# Patient Record
Sex: FEMALE | Race: WHITE | Employment: OTHER | URBAN - METROPOLITAN AREA
[De-identification: names, ages, dates, MRNs, and addresses within clinical notes are randomized per-mention and may not be internally consistent; named-entity substitution may affect disease eponyms.]

---

## 2017-03-10 ENCOUNTER — OFFICE VISIT (OUTPATIENT)
Dept: INTERNAL MEDICINE | Facility: CLINIC | Age: 77
End: 2017-03-10
Payer: COMMERCIAL

## 2017-03-10 ENCOUNTER — TELEPHONE (OUTPATIENT)
Dept: INTERNAL MEDICINE | Facility: CLINIC | Age: 77
End: 2017-03-10

## 2017-03-10 DIAGNOSIS — Z00.00 ROUTINE GENERAL MEDICAL EXAMINATION AT A HEALTH CARE FACILITY: Primary | ICD-10-CM

## 2017-03-10 DIAGNOSIS — R14.2 FLATULENCE, ERUCTATION, AND GAS PAIN: ICD-10-CM

## 2017-03-10 DIAGNOSIS — G30.9 ALZHEIMER'S DEMENTIA WITHOUT BEHAVIORAL DISTURBANCE, UNSPECIFIED TIMING OF DEMENTIA ONSET: ICD-10-CM

## 2017-03-10 DIAGNOSIS — N91.2 ABSENCE OF MENSTRUATION: ICD-10-CM

## 2017-03-10 DIAGNOSIS — F02.80 ALZHEIMER'S DEMENTIA WITHOUT BEHAVIORAL DISTURBANCE, UNSPECIFIED TIMING OF DEMENTIA ONSET: ICD-10-CM

## 2017-03-10 DIAGNOSIS — Z23 NEED FOR PROPHYLACTIC VACCINATION AND INOCULATION AGAINST INFLUENZA: ICD-10-CM

## 2017-03-10 DIAGNOSIS — R14.3 FLATULENCE, ERUCTATION, AND GAS PAIN: ICD-10-CM

## 2017-03-10 DIAGNOSIS — R14.1 FLATULENCE, ERUCTATION, AND GAS PAIN: ICD-10-CM

## 2017-03-10 LAB
ERYTHROCYTE [DISTWIDTH] IN BLOOD BY AUTOMATED COUNT: 13.3 % (ref 10–15)
HCT VFR BLD AUTO: 45.7 % (ref 35–47)
HGB BLD-MCNC: 14.9 G/DL (ref 11.7–15.7)
MCH RBC QN AUTO: 31 PG (ref 26.5–33)
MCHC RBC AUTO-ENTMCNC: 32.6 G/DL (ref 31.5–36.5)
MCV RBC AUTO: 95 FL (ref 78–100)
PLATELET # BLD AUTO: 231 10E9/L (ref 150–450)
RBC # BLD AUTO: 4.8 10E12/L (ref 3.8–5.2)
WBC # BLD AUTO: 6 10E9/L (ref 4–11)

## 2017-03-10 PROCEDURE — 80061 LIPID PANEL: CPT | Performed by: INTERNAL MEDICINE

## 2017-03-10 PROCEDURE — 84443 ASSAY THYROID STIM HORMONE: CPT | Performed by: INTERNAL MEDICINE

## 2017-03-10 PROCEDURE — 99397 PER PM REEVAL EST PAT 65+ YR: CPT | Performed by: INTERNAL MEDICINE

## 2017-03-10 PROCEDURE — 85027 COMPLETE CBC AUTOMATED: CPT | Performed by: INTERNAL MEDICINE

## 2017-03-10 PROCEDURE — 80053 COMPREHEN METABOLIC PANEL: CPT | Performed by: INTERNAL MEDICINE

## 2017-03-10 PROCEDURE — 36415 COLL VENOUS BLD VENIPUNCTURE: CPT | Performed by: INTERNAL MEDICINE

## 2017-03-10 RX ORDER — DONEPEZIL HYDROCHLORIDE 5 MG/1
5 TABLET, FILM COATED ORAL AT BEDTIME
Qty: 90 TABLET | Refills: 3 | Status: SHIPPED | OUTPATIENT
Start: 2017-03-10

## 2017-03-10 RX ORDER — MEMANTINE HYDROCHLORIDE 10 MG/1
10 TABLET ORAL 2 TIMES DAILY
Qty: 180 TABLET | Refills: 3 | Status: ON HOLD | OUTPATIENT
Start: 2017-03-10 | End: 2018-03-20

## 2017-03-10 NOTE — MR AVS SNAPSHOT
After Visit Summary   3/10/2017    Adriane Flores    MRN: [id number]           Patient Information     Date Of Birth          1940        Visit Information        Provider Department      3/10/2017 10:00 AM Saul Wheeler MD [facility]        Today's Diagnoses     Routine general medical examination at a health care facility    -  1    Need for prophylactic vaccination and inoculation against influenza        Alzheimer's dementia without behavioral disturbance, unspecified timing of dementia onset        Absence of menstruation        Flatulence, eructation, and gas pain          Care Instructions      Preventive Health Recommendations    Female Ages 65 +    Yearly exam:     See your health care provider every year in order to  o Review health changes.   o Discuss preventive care.    o Review your medicines if your doctor has prescribed any.      You no longer need a yearly Pap test unless you've had an abnormal Pap test in the past 10 years. If you have vaginal symptoms, such as bleeding or discharge, be sure to talk with your provider about a Pap test.      Every 1 to 2 years, have a mammogram.  If you are over 69, talk with your health care provider about whether or not you want to continue having screening mammograms.      Every 10 years, have a colonoscopy. Or, have a yearly FIT test (stool test). These exams will check for colon cancer.       Have a cholesterol test every 5 years, or more often if your doctor advises it.       Have a diabetes test (fasting glucose) every three years. If you are at risk for diabetes, you should have this test more often.       At age 65, have a bone density scan (DEXA) to check for osteoporosis (brittle bone disease).    Shots:    Get a flu shot each year.    Get a tetanus shot every 10 years.    Talk to your doctor about your pneumonia vaccines. There are now two you should receive - Pneumovax (PPSV 23) and Prevnar (PCV 13).    Talk  "to your doctor about the shingles vaccine.    Talk to your doctor about the hepatitis B vaccine.    Nutrition:     Eat at least 5 servings of fruits and vegetables each day.      Eat whole-grain bread, whole-wheat pasta and brown rice instead of white grains and rice.      Talk to your provider about Calcium and Vitamin D.     Lifestyle    Exercise at least 150 minutes a week (30 minutes a day, 5 days a week). This will help you control your weight and prevent disease.      Limit alcohol to one drink per day.      No smoking.       Wear sunscreen to prevent skin cancer.       See your dentist twice a year for an exam and cleaning.      See your eye doctor every 1 to 2 years to screen for conditions such as glaucoma, macular degeneration and cataracts.        Follow-ups after your visit        Who to contact     If you have questions or need follow up information about today's clinic visit or your schedule please contact [facility] directly at [phone].  Normal or non-critical lab and imaging results will be communicated to you by MyChart, letter or phone within 4 business days after the clinic has received the results. If you do not hear from us within 7 days, please contact the clinic through [facility] or phone. If you have a critical or abnormal lab result, we will notify you by phone as soon as possible.  Submit refill requests through [employer] or call your pharmacy and they will forward the refill request to us. Please allow 3 business days for your refill to be completed.          Additional Information About Your Visit        iJoule Information     [employer]le lets you send messages to your doctor, view your test results, renew your prescriptions, schedule appointments and more. To sign up, go to [email or web address] . Click on \"Log in\" on the left side of the screen, which will take you to the Welcome page. Then click on \"Sign up Now\" on the right side of the page.     You will be asked to " enter the access code listed below, as well as some personal information. Please follow the directions to create your username and password.     Your access code is: [email or web address]  Expires: 2017 10:34 AM     Your access code will  in 90 days. If you need help or a new code, please call your [facility] or [phone].        Care EveryWhere ID     This is your Care EveryWhere ID. This could be used by other organizations to access your [place] medical records  [id number]         Blood Pressure from Last 3 Encounters:   16 118/70   16 128/72   02/18/15 128/76    Weight from Last 3 Encounters:   16 126 lb (57.2 kg)   16 123 lb (55.8 kg)   02/18/15 119 lb 3.2 oz (54.1 kg)              We Performed the Following     CBC with platelets     Comprehensive metabolic panel     Lipid panel reflex to direct LDL     TSH with free T4 reflex          Where to get your medicines      These medications were sent to [facility] [id number] [place] [address] [address] [place] [facility] [place] [address], [facility] [address]     Phone:  [phone]     donepezil 5 MG tablet    memantine 10 MG tablet          Primary Care Provider Office Phone # Fax #    Saul Wheeler [phone]       [facility] [address] [place] [address]        Thank you!     Thank you for choosing [facility]  for your care. Our goal is always to provide you with excellent care. Hearing back from our patients is one way we can continue to improve our services. Please take a few minutes to complete the written survey that you may receive in the mail after your visit with us. Thank you!             Your Updated Medication List - Protect others around you: Learn how to safely use, store and throw away your medicines at www.disposemymeds.org.          This list is accurate as of: 3/10/17 10:34 AM.  Always use your most recent med list.                    Brand Name Dispense Instructions for use    CALTRATE 600 + D 600-200 MG-IU Tabs     35    1 TABLET BID       donepezil 5 MG tablet    ARICEPT    90 tablet    Take 1 tablet (5 mg) by mouth At Bedtime       glucosamine sulfate 1000 MG Caps      1 tablet daily along with 1 500mg tablet       memantine 10 MG tablet    NAMENDA    180 tablet    Take 1 tablet (10 mg) by mouth 2 times daily       multivitamin per tablet      1 tablet daily

## 2017-03-10 NOTE — PROGRESS NOTES
SUBJECTIVE:                                                            Adriane Flores is a 76 year old female who presents for Preventive Visit.      Are you in the first 12 months of your Medicare Part B coverage?  No    Healthy Habits:    Do you get at least three servings of calcium containing foods daily (dairy, green leafy vegetables, etc.)? yes    Amount of exercise or daily activities, outside of work: none    Problems taking medications regularly No    Medication side effects: No    Have you had an eye exam in the past two years? no    Do you see a dentist twice per year? yes    Do you have sleep apnea, excessive snoring or daytime drowsiness?no    COGNITIVE SCREENING:  Not appropriate due to known dementia        Pt with dementia. Her  is taking care of her, on Namenda and Aricept. Slow progression.     Reviewed and updated as needed this visit by clinical staff  Tobacco  Allergies  Problems  Med Hx  Surg Hx  Fam Hx  Soc Hx        Reviewed and updated as needed this visit by Provider        Social History   Substance Use Topics     Smoking status: Former Smoker     Packs/day: 0.50     Years: 15.00     Quit date: 8/11/1970     Smokeless tobacco: Never Used      Comment: QUIT 25 YEARS AGO     Alcohol use No       none    Today's PHQ-2 Score:   PHQ-2 ( 1999 Pf[employer]) 3/10/2017 2/23/2016   Q1: Little interest or pleasure in doing things 0 0   Q2: Feeling down, depressed or hopeless 0 0   PHQ-2 Score 0 0       Do you feel safe in your environment - Yes    Do you have a Health Care Directive?: Yes: Advance Directive has been received and scanned.    Current providers sharing in care for this patient include:   Patient Care Team:  Saul Wheeler MD as PCP - General (Internal Medicine)      Hearing impairment: No    Ability to successfully perform activities of daily living: No, needs assistance with: phone, transportation, shopping, preparing meals, housework, bathing, laundry, medications  "and managing money     Fall risk:  Fallen 2 or more times in the past year?: No  Any fall with injury in the past year?: No    Home safety:  none identified      The following health maintenance items are reviewed in Epic and correct as of today:  Health Maintenance   Topic Date Due     ADVANCE DIRECTIVE PLANNING Q5 YRS (NO INBASKET)  01/03/2017     FALL RISK ASSESSMENT  02/23/2017     INFLUENZA VACCINE (SYSTEM ASSIGNED)  09/01/2017     LIPID SCREEN Q5 YR FEMALE (SYSTEM ASSIGNED)  02/23/2021     TETANUS Q10 YR  08/01/2024     DEXA SCAN SCREENING (SYSTEM ASSIGNED)  Completed     PNEUMOCOCCAL  Completed              ROS:  C: NEGATIVE for fever, chills, change in weight  I: NEGATIVE for worrisome rashes, moles or lesions  E: NEGATIVE for vision changes or irritation  E/M: NEGATIVE for ear, mouth and throat problems  R: NEGATIVE for significant cough or SOB  B: NEGATIVE for masses, tenderness or discharge  CV: NEGATIVE for chest pain, palpitations or peripheral edema  GI: NEGATIVE for nausea, abdominal pain, heartburn, or change in bowel habits  : NEGATIVE for frequency, dysuria, or hematuria  M: NEGATIVE for significant arthralgias or myalgia  N: NEGATIVE for weakness, dizziness or paresthesias  E: NEGATIVE for temperature intolerance, skin/hair changes  H: NEGATIVE for bleeding problems  P: NEGATIVE for changes in mood or affect    Problem list, Medication list, Allergies, and Medical/Social/Surgical histories reviewed in [facility] and updated as appropriate.  OBJECTIVE:                                                            There were no vitals taken for this visit. Estimated body mass index is 23.81 kg/(m^2) as calculated from the following:    Height as of 2/23/16: 5' 1\" (1.549 m).    Weight as of 2/23/16: 126 lb (57.2 kg).  EXAM:   GENERAL: healthy, alert and no distress  EYES: Eyes grossly normal to inspection, PERRL and conjunctivae and sclerae normal  HENT: ear canals and TM's normal, nose and mouth without " "ulcers or lesions  NECK: no adenopathy, no asymmetry, masses, or scars and thyroid normal to palpation  RESP: lungs clear to auscultation - no rales, rhonchi or wheezes  BREAST: normal without masses, tenderness or nipple discharge and no palpable axillary masses or adenopathy  CV: regular rate and rhythm, normal S1 S2, no S3 or S4, no murmur, click or rub, no peripheral edema and peripheral pulses strong  ABDOMEN: soft, nontender, no hepatosplenomegaly, no masses and bowel sounds normal  MS: no gross musculoskeletal defects noted, no edema  SKIN: no suspicious lesions or rashes  NEURO: Normal strength and tone, mentation intact and speech normal  PSYCH: mentation - confuse,  affect normal    ASSESSMENT / PLAN:                                                                ICD-10-CM    1. Routine general medical examination at a health care facility Z00.00 CBC with platelets     Comprehensive metabolic panel     TSH with free T4 reflex     Lipid panel reflex to direct LDL   2. Need for prophylactic vaccination and inoculation against influenza Z23 donepezil (ARICEPT) 5 MG tablet   3. Alzheimer's dementia without behavioral disturbance, unspecified timing of dementia onset G30.9 memantine (NAMENDA) 10 MG tablet    F02.80 CBC with platelets     Comprehensive metabolic panel     TSH with free T4 reflex     Lipid panel reflex to direct LDL   4. Absence of menstruation N91.2    5. Flatulence, eructation, and gas pain R14.3     R14.1     R14.2        End of Life Planning:  Patient currently has an advanced directive: Yes.  Practitioner is supportive of decision.    COUNSELING:  Reviewed preventive health counseling, as reflected in patient instructions       Regular exercise       Healthy diet/nutrition       Vision screening       Hearing screening        Estimated body mass index is 23.81 kg/(m^2) as calculated from the following:    Height as of 2/23/16: 5' 1\" (1.549 m).    Weight as of 2/23/16: 126 lb (57.2 kg).     " reports that she quit smoking about 46 years ago. She has a 7.50 pack-year smoking history. She has never used smokeless tobacco.      Appropriate preventive services were discussed with this patient, including applicable screening as appropriate for cardiovascular disease, diabetes, osteopenia/osteoporosis, and glaucoma.  As appropriate for age/gender, discussed screening for colorectal cancer, prostate cancer, breast cancer, and cervical cancer. Checklist reviewing preventive services available has been given to the patient.    Reviewed patients plan of care and provided an AVS. The Intermediate Care Plan ( asthma action plan, low back pain action plan, and migraine action plan) for Adriane meets the Care Plan requirement. This Care Plan has been established and reviewed with the Patient and spouse.    Counseling Resources:  ATP IV Guidelines  Pooled Cohorts Equation Calculator  Breast Cancer Risk Calculator  FRAX Risk Assessment  ICSI Preventive Guidelines  Dietary Guidelines for Americans, 2010  USDA's MyPlate  ASA Prophylaxis  Lung CA Screening    Saul Wheeler MD  [facility]

## 2017-03-10 NOTE — TELEPHONE ENCOUNTER
Fax received from [facility] for review and signature.  Put in Dr. Wheeler's in basket.

## 2017-03-10 NOTE — LETTER
[facility]  [address], [address]  [place]  [address]      March 13, 2017    Adriane Flores                                                                                                                                                       [address] [address]              Dear Adriane,      The results of your recent tests were within normal.      Enclosed is a copy of the results.  It was a pleasure to see you at your last appointment.    If you have any questions or concerns, please contact our office at [phone].        Sincerely,      Saul Wheeler M.D.

## 2017-03-11 LAB
ALBUMIN SERPL-MCNC: 4 G/DL (ref 3.4–5)
ALP SERPL-CCNC: 76 U/L (ref 40–150)
ALT SERPL W P-5'-P-CCNC: 21 U/L (ref 0–50)
ANION GAP SERPL CALCULATED.3IONS-SCNC: 9 MMOL/L (ref 3–14)
AST SERPL W P-5'-P-CCNC: 16 U/L (ref 0–45)
BILIRUB SERPL-MCNC: 0.4 MG/DL (ref 0.2–1.3)
BUN SERPL-MCNC: 16 MG/DL (ref 7–30)
CALCIUM SERPL-MCNC: 8.8 MG/DL (ref 8.5–10.1)
CHLORIDE SERPL-SCNC: 107 MMOL/L (ref 94–109)
CHOLEST SERPL-MCNC: 214 MG/DL
CO2 SERPL-SCNC: 29 MMOL/L (ref 20–32)
CREAT SERPL-MCNC: 0.87 MG/DL (ref 0.52–1.04)
GFR SERPL CREATININE-BSD FRML MDRD: 63 ML/MIN/1.7M2
GLUCOSE SERPL-MCNC: 65 MG/DL (ref 70–99)
HDLC SERPL-MCNC: 53 MG/DL
LDLC SERPL CALC-MCNC: 128 MG/DL
NONHDLC SERPL-MCNC: 161 MG/DL
POTASSIUM SERPL-SCNC: 3.8 MMOL/L (ref 3.4–5.3)
PROT SERPL-MCNC: 7.1 G/DL (ref 6.8–8.8)
SODIUM SERPL-SCNC: 145 MMOL/L (ref 133–144)
TRIGL SERPL-MCNC: 166 MG/DL
TSH SERPL DL<=0.005 MIU/L-ACNC: 2.01 MU/L (ref 0.4–4)

## 2017-03-15 NOTE — TELEPHONE ENCOUNTER
Nurse from [facility] called. Stated she faxed form on 3/10 and needs back ASAP so they can continue to see patient. Relayed form is in [employer] box to sign, and will see if signed yet and will fax when done.

## 2017-05-10 ENCOUNTER — TRANSFERRED RECORDS (OUTPATIENT)
Dept: HEALTH INFORMATION MANAGEMENT | Facility: CLINIC | Age: 77
End: 2017-05-10

## 2017-07-05 ENCOUNTER — APPOINTMENT (OUTPATIENT)
Dept: GENERAL RADIOLOGY | Facility: CLINIC | Age: 77
End: 2017-07-05
Attending: EMERGENCY MEDICINE
Payer: COMMERCIAL

## 2017-07-05 ENCOUNTER — HOSPITAL ENCOUNTER (EMERGENCY)
Facility: CLINIC | Age: 77
Discharge: HOME | End: 2017-07-05
Attending: EMERGENCY MEDICINE | Admitting: EMERGENCY MEDICINE
Payer: COMMERCIAL

## 2017-07-05 ENCOUNTER — APPOINTMENT (OUTPATIENT)
Dept: CT IMAGING | Facility: CLINIC | Age: 77
End: 2017-07-05
Attending: EMERGENCY MEDICINE
Payer: COMMERCIAL

## 2017-07-05 VITALS
DIASTOLIC BLOOD PRESSURE: 57 MMHG | OXYGEN SATURATION: 98 % | TEMPERATURE: 97.6 F | SYSTOLIC BLOOD PRESSURE: 181 MMHG | RESPIRATION RATE: 16 BRPM | HEART RATE: 72 BPM

## 2017-07-05 DIAGNOSIS — R10.84 ABDOMINAL PAIN, GENERALIZED: ICD-10-CM

## 2017-07-05 DIAGNOSIS — M79.662 PAIN OF LEFT LOWER LEG: ICD-10-CM

## 2017-07-05 LAB
ALBUMIN SERPL-MCNC: 3.9 G/DL (ref 3.4–5)
ALP SERPL-CCNC: 69 U/L (ref 40–150)
ALT SERPL W P-5'-P-CCNC: 19 U/L (ref 0–50)
ANION GAP SERPL CALCULATED.3IONS-SCNC: 5 MMOL/L (ref 3–14)
AST SERPL W P-5'-P-CCNC: 20 U/L (ref 0–45)
BASOPHILS # BLD AUTO: 0 10E9/L (ref 0–0.2)
BASOPHILS NFR BLD AUTO: 0.5 %
BILIRUB SERPL-MCNC: 0.4 MG/DL (ref 0.2–1.3)
BUN SERPL-MCNC: 14 MG/DL (ref 7–30)
CALCIUM SERPL-MCNC: 8.9 MG/DL (ref 8.5–10.1)
CHLORIDE SERPL-SCNC: 107 MMOL/L (ref 94–109)
CO2 SERPL-SCNC: 31 MMOL/L (ref 20–32)
CREAT SERPL-MCNC: 0.81 MG/DL (ref 0.52–1.04)
DIFFERENTIAL METHOD BLD: NORMAL
EOSINOPHIL # BLD AUTO: 0.3 10E9/L (ref 0–0.7)
EOSINOPHIL NFR BLD AUTO: 4.8 %
ERYTHROCYTE [DISTWIDTH] IN BLOOD BY AUTOMATED COUNT: 13.1 % (ref 10–15)
GFR SERPL CREATININE-BSD FRML MDRD: 69 ML/MIN/1.7M2
GLUCOSE SERPL-MCNC: 91 MG/DL (ref 70–99)
HCT VFR BLD AUTO: 43.7 % (ref 35–47)
HGB BLD-MCNC: 14.5 G/DL (ref 11.7–15.7)
IMM GRANULOCYTES # BLD: 0 10E9/L (ref 0–0.4)
IMM GRANULOCYTES NFR BLD: 0.2 %
LYMPHOCYTES # BLD AUTO: 2 10E9/L (ref 0.8–5.3)
LYMPHOCYTES NFR BLD AUTO: 34 %
MCH RBC QN AUTO: 31.1 PG (ref 26.5–33)
MCHC RBC AUTO-ENTMCNC: 33.2 G/DL (ref 31.5–36.5)
MCV RBC AUTO: 94 FL (ref 78–100)
MONOCYTES # BLD AUTO: 0.6 10E9/L (ref 0–1.3)
MONOCYTES NFR BLD AUTO: 9.5 %
NEUTROPHILS # BLD AUTO: 3.1 10E9/L (ref 1.6–8.3)
NEUTROPHILS NFR BLD AUTO: 51 %
NRBC # BLD AUTO: 0 10*3/UL
NRBC BLD AUTO-RTO: 0 /100
PLATELET # BLD AUTO: 242 10E9/L (ref 150–450)
POTASSIUM SERPL-SCNC: 4.3 MMOL/L (ref 3.4–5.3)
PROT SERPL-MCNC: 7.2 G/DL (ref 6.8–8.8)
RBC # BLD AUTO: 4.66 10E12/L (ref 3.8–5.2)
SODIUM SERPL-SCNC: 143 MMOL/L (ref 133–144)
WBC # BLD AUTO: 6 10E9/L (ref 4–11)

## 2017-07-05 PROCEDURE — 85025 COMPLETE CBC W/AUTO DIFF WBC: CPT | Performed by: EMERGENCY MEDICINE

## 2017-07-05 PROCEDURE — 74177 CT ABD & PELVIS W/CONTRAST: CPT

## 2017-07-05 PROCEDURE — 73590 X-RAY EXAM OF LOWER LEG: CPT | Mod: LT

## 2017-07-05 PROCEDURE — 80053 COMPREHEN METABOLIC PANEL: CPT | Performed by: EMERGENCY MEDICINE

## 2017-07-05 PROCEDURE — 99285 EMERGENCY DEPT VISIT HI MDM: CPT | Mod: 25

## 2017-07-05 PROCEDURE — 25000125 ZZHC RX 250: Performed by: EMERGENCY MEDICINE

## 2017-07-05 PROCEDURE — 25000128 H RX IP 250 OP 636: Performed by: EMERGENCY MEDICINE

## 2017-07-05 RX ORDER — IOPAMIDOL 755 MG/ML
64 INJECTION, SOLUTION INTRAVASCULAR ONCE
Status: COMPLETED | OUTPATIENT
Start: 2017-07-05 | End: 2017-07-05

## 2017-07-05 RX ADMIN — IOPAMIDOL 64 ML: 755 INJECTION, SOLUTION INTRAVENOUS at 14:26

## 2017-07-05 RX ADMIN — SODIUM CHLORIDE 64 ML: 9 INJECTION, SOLUTION INTRAVENOUS at 14:26

## 2017-07-05 ASSESSMENT — ENCOUNTER SYMPTOMS
COUGH: 0
ARTHRALGIAS: 1
ABDOMINAL PAIN: 1
WOUND: 1
VOMITING: 0
DIARRHEA: 0
FEVER: 0

## 2017-07-05 NOTE — ED PROVIDER NOTES
History     Chief Complaint:  Abdominal Pain and Inability to Walk    HPI   Patient history provided primarily by  present at bedside    Adrinae Flores is a 77 year old female with a history of Alzheimer's who presents with  for evaluation of abdominal pain and inability to walk. The patient's  reports the patient was at her adult  today and staff called him because she couldn't walk. He states she was falling because something was not working right. The patient can normally walk fine on her own. Staff also noted that the patient was rubbing her stomach and  thinks she is having abdominal pain.  brought her to the ED for evaluation. On arrival to the ED, the patient reports she has generalized abdominal pain and pain in her left lower leg.  notes a bruise on her left knee that he just noticed in the ED. The patient's grandson notes that the patient had a stumble and fall sometime last week. She did not hit her head when she fell. The patient's  reports she was fine yesterday and this morning. The patient denies any left hip pain.  denies any fever, vomiting, or diarrhea.     Allergies:  Sulfa     Medications:    memantine (NAMENDA) 10 MG tablet  donepezil (ARICEPT) 5 MG tablet  CALTRATE 600 + D 600-200 MG-IU OR TABS  MULTIVITAMINS OR TABS  GLUCOSAMINE SULFATE 1000 MG OR CAPS     Past Medical History:    Alzheimer's disease  Osteoarthritis  Displacement of intervertebral disc    Past Surgical History:    Tonsillectomy and adenoidectomy  Tennis elbow surgery  Left leg surgery  Eye surgery    Family History:    Alcohol/drug  CVA    Social History:  Smoking status: Former smoker, quit 1970  Alcohol use: No  Presents to the ED with her  and grandson  Marital Status:   [2]     Review of Systems   Constitutional: Negative for fever.   Respiratory: Negative for cough.    Gastrointestinal: Positive for abdominal pain. Negative for diarrhea and  vomiting.   Musculoskeletal: Positive for arthralgias and gait problem.   Skin: Positive for wound (bruise).   10 point review of systems performed and is negative except as above and in HPI.    Physical Exam     Patient Vitals for the past 24 hrs:   BP Temp Temp src Pulse Resp SpO2   07/05/17 1006 181/57 97.6  F (36.4  C) Temporal 72 16 98 %       Physical Exam  General: Resting on the gurney, appears somewhat uncomfortable  Head:  The scalp, face, and head appear normal  Mouth/Throat: Mucus membranes are moist  CV:  Regular rate    Normal S1 and S2  No pathological murmur   Resp:  Breath sounds clear and equal bilaterally    Non-labored, no retractions or accessory muscle use    No coarseness    No wheezing   GI:  Abdomen is soft, no rigidity    Abdominal tenderness to palpation diffusely. No guarding. No rebound.   MS:  Normal motor assessment of all extremities.    Good capillary refill noted.    Left distal tib/fib tender to palpation with slight swelling. Left knee with small contusion.   Neuro:   Speech is normal and fluent. Baseline dementia without focal motor or sensory deficit. The exam is otherwise difficult secondary to dementia.   Psych:  Unable to assess due to baseline dementia    Emergency Department Course   Imaging:  Radiographic findings were communicated with the patient who voiced understanding of the findings.    CT-scan Abdomen/Pelvis w contrast:  No definite acute abnormality.  Preliminary result per radiology.     X-ray Tib/Fib left, 2 views:  Chronic appearing deformity of the distal fibular shaft  and distal tibial shaft, likely related to old healed trauma.  Otherwise negative. The tibial plateau is not well visualized on the  lateral view.   Result per radiology.     Laboratory:  CBC: WNL (WBC 6.0, HGB 14.5, )   CMP: WNL (Creatinine 0.81)    Emergency Department Course:  Past medical records, nursing notes, and vitals reviewed.  1037: I performed an exam of the patient and  obtained history, as documented above.  IV inserted and blood drawn.  The patient was sent for a CT abdomen and tib/fib left while in the emergency department, findings above.    I discussed the treatment plan with the patient. They expressed understanding of this plan and consented to discharge. They will be discharged home with instructions for care and follow up. In addition, the patient will return to the emergency department if their symptoms persist, worsen, if new symptoms arise or if there is any concern.  All questions were answered.    Impression & Plan      Medical Decision Making:  Adriane Flores is a 77 year old female who presents to the emergency department today with family concern for abdominal pain. The patient has severe dementia and has difficulty making her symptoms and needs known. The patient was having difficulty walking and did have some tenderness to palpation in the left lower extremity. Xrays obtained and showed an old fracture with normal healing which did not appear acute. Given that her symptoms started this afternoon, I did not believe that this is the cause of her pain. Additionally, there was concern for abdominal pain. CT imaging obtained and unremarkable and labs were unremarkable as well. Patient was walked in the ED and was able to walk per her routine at home, and therefore was readied for discharge. She will follow up with PCP and is discharged in stable condition.    Diagnosis:    ICD-10-CM   1. Pain of left lower leg M79.662   2. Abdominal pain, generalized R10.84     Disposition:  discharged to home  Mikki Ramirez  7/5/2017    EMERGENCY DEPARTMENT    IMikki, am serving as a scribe at 10:37 AM on 7/5/2017 to document services personally performed by Lexy Sparks MD based on my observations and the provider's statements to me.        Lexy Sparks MD  07/08/17 [id number]

## 2017-07-05 NOTE — DISCHARGE INSTRUCTIONS
Discharge Instructions  Extremity Injury    You were seen today for an injury to an extremity (arm, hand, leg, or foot). You may have a bruise, strain, or fracture (broken bone).    Return to the Emergency Department or see your regular doctor if your injured area is not back to normal within 5-7 days.    Return to the Emergency Department right away if:    Your pain seems to change or get worse or there is pain in a new area.    Your extremity becomes pale, cool, blue, or numb or tingling past the injury.    You have more drainage, redness or pain in the area of the cut or abrasion.    You have pain that you can t control with the medicine recommended or prescribed here, or you have pain that seems too much for your injury.    Your child will not stop crying or is much more fussy than normal.    You have new symptoms or anything that worries you.    What to Expect:    Your swelling and pain may be worse the day after your injury, but should not be severe and should start getting better after that. You should not have new symptoms and your pain should not get worse.    You may start to get a bruise over the injured area or below the injured area.    Your movement and strength should get better with time.    Some injuries may not show up until after you have left the Emergency Department so it is important to follow-up with your regular doctor.    Your injury may prevent you from working.  Follow-up with your regular doctor to get a work release note.    Pain medications or your injury may make it unsafe to drive or operate machinery.    Home Care:    Apply ice your injured area for 15 minutes at a time, at least 3 times a day. Use a cloth between the ice bag and your skin to prevent frostbite.     Do not sleep with an ice pack or heating pad on, since this can cause burns or skin injury.    Rest your injured area for at least 1-2 days. After that you may start using your extremity again as long as there is not too  much pain.     Raise the injured area above the level of your heart as much as possible in the first 1-2 days.    Use Tylenol  (acetaminophen), Motrin (ibuprofen), or Advil  (ibuprofen) for your pain unless you have an allergy or are told not to use these medications by your doctor.  Take the medications as instructed on the package. Tylenol  (acetaminophen) is in many prescription medicines and non-prescription medicines--check all of your medicines to be sure you aren t taking more than 3000 mg per day.    You may use an elastic bandage (Ace  Wrap) if it makes you more comfortable. Wrap it just tight enough to provide light compression, like a new pair of socks feels. Loosen the bandage if you have swelling past the bandage.      Please follow any other instructions that were discussed with you by your doctor.    MORE INFORMATION:    X-rays:  X-rays done today were read by your doctor but will also be read by a radiologist.  We will contact you if the radiologist sees anything different on the x-ray.  Your regular doctor may also want to review your x-rays on follow-up.    You could have a fracture (break), even if we told you your x-rays were normal. X-rays are not always certain, and some fractures are hard to see and may not show up right away.  Also, your x-ray may look like you have a fracture, even though you do not.  It is important to follow-up with your regular doctor.     Stretching:  If your injury was to your arm or shoulder and your doctor put you in a sling or an immobilizer, it is important that you take off your immobilizer within 3 days and stretch/move your shoulder, unless your doctor specifically tells you to not move your shoulder.  This is to prevent further injury such as a  frozen shoulder .     If you were given a prescription for medicine here today, be sure to read all of the information (including the package insert) that comes with your prescription.  This will include important  information about the medicine, its side effects, and any warnings that you need to know about.  The pharmacist who fills the prescription can provide more information and answer questions you may have about the medicine.  If you have questions or concerns that the pharmacist cannot address, please call or return to the Emergency Department.     Opioid Medication Information    Pain medications are among the most commonly prescribed medicines, so we are including this information for all our patients. If you did not receive pain medication or get a prescription for pain medicine, you can ignore it.     You may have been given a prescription for an opioid (narcotic) pain medicine and/or have received a pain medicine while here in the Emergency Department. These medicines can make you drowsy or impaired. You must not drive, operate dangerous equipment, or engage in any other dangerous activities while taking these medications. If you drive while taking these medications, you could be arrested for DUI, or driving under the influence. Do not drink any alcohol while you are taking these medications.     Opioid pain medications can cause addiction. If you have a history of chemical dependency of any type, you are at a higher risk of becoming addicted to pain medications.  Only take these prescribed medications to treat your pain when all other options have been tried. Take it for as short a time and as few doses as possible. Store your pain pills in a secure place, as they are frequently stolen and provide a dangerous opportunity for children or visitors in your house to start abusing these powerful medications. We will not replace any lost or stolen medicine.  As soon as your pain is better, you should flush all your remaining medication.     Many prescription pain medications contain Tylenol  (acetaminophen), including Vicodin , Tylenol #3 , Norco , Lortab , and Percocet .  You should not take any extra pills of  Tylenol  if you are using these prescription medications or you can get very sick.  Do not ever take more than 3000 mg of acetaminophen in any 24 hour period.    All opioids tend to cause constipation. Drink plenty of water and eat foods that have a lot of fiber, such as fruits, vegetables, prune juice, apple juice and high fiber cereal.  Take a laxative if you don t move your bowels at least every other day. Miralax , Milk of Magnesia, Colace , or Senna  can be used to keep you regular.      Remember that you can always come back to the Emergency Department if you are not able to see your regular doctor in the amount of time listed above, if you get any new symptoms, or if there is anything that worries you.    Discharge Instructions  Abdominal Pain    Abdominal pain can be caused by many things. Your evaluation today does not show the exact cause for your pain. Your doctor today has decided that it is unlikely your pain is due to a life threatening problem, or a problem requiring surgery or hospital admission. Sometimes those problems cannot be found right away, so it is very important that you follow up as directed.  Sometimes only the changes which occur over time allow the cause of your pain to be found.    Return to the Emergency Department for a recheck in 8-12 hours if your pain continues.  If your pain gets worse, changes in location, or feels different, return to the Emergency Department right away.    ADULTS:  Return to the Emergency Department right away if:      You get an oral temperature above 102oF or as directed by your doctor.    You have blood in your stools (bright red or black, tarry stools).    You keep throwing up or can t drink liquids.    You see blood when you throw up.    You can t have a bowel movement or you can t pass gas.    Your stomach gets bloated or bigger.    Your skin or the whites of your eyes look yellow.    You faint.    You have bloody, frequent or painful urination.    You  have new symptoms or anything that worries you.    CHILDREN:  Return to the Emergency Department right away if your child has any of the above-listed symptoms or the following:      Pushes your hand away or screams/cries when his/her belly is touched.    You notice your child is very fussy or weak.    Your child is very tired and is too tired to eat or drink.    Your child is dehydrated.  Signs of dehydration can be:  o Your infant has had no wet diapers in 4-5 hours.  o Your older child has not passed urine in 6-8 hours.  o Your infant or child starts to have dry mouth and lips, or no saliva or tears.    PREGNANT WOMEN:  Return to the Emergency Department right away if you have any of the above-listed symptoms or the following:      You have bleeding, leaking fluid or passing tissue from the vagina.    You have worse pain or cramping, or pain in your shoulder or back.    You have vomiting that will not stop.    You have painful or bloody urination.    You have a temperature of 100oF or more.    Your baby is not moving as much as usual.    You faint.    You get a bad headache with or without eye problems and abdominal pain.    You have a convulsion or seizure.    You have unusual discharge from your vagina and abdominal pain.    Abdominal pain is pretty common during pregnancy.  Your pain may or may not be related to your pregnancy. You should follow-up closely with your OB doctor so they can evaluate you and your baby.  Until you follow-up with your regular doctor, do the following:       Avoid sex and do not put anything in your vagina.    Drink clear fluids.    Only take medications approved by your doctor.    MORE INFORMATION:    Appendicitis:  A possible cause of abdominal pain in any person who still has their appendix is acute appendicitis. Appendicitis is often hard to diagnose.  Testing does not always rule out early appendicitis or other causes of abdominal pain. Close follow-up with your doctor and  "re-evaluations may be needed to figure out the reason for your abdominal pain.    Follow-up:  It is very important that you make an appointment with your clinic and go to the appointment.  If you do not follow-up with your primary doctor, it may result in missing an important development which could result in permanent injury or disability and/or lasting pain.  If there is any problem keeping your appointment, call your doctor or return to the Emergency Department.    Medications:  Take your medications as directed by your doctor today.  Before using over-the-counter medications, ask your doctor and make sure to take the medications as directed.  If you have any questions about medications, ask your doctor.    Diet:  Resume your normal diet as much as possible, but do not eat fried, fatty or spicy foods while you have pain.  Do not drink alcohol or have caffeine.  Do not smoke tobacco.    Probiotics: If you have been given an antibiotic, you may want to also take a probiotic pill or eat yogurt with live cultures. Probiotics have \"good bacteria\" to help your intestines stay healthy. Studies have shown that probiotics help prevent diarrhea and other intestine problems (including C. diff infection) when you take antibiotics. You can buy these without a prescription in the pharmacy section of the store.     If you were given a prescription for medicine here today, be sure to read all of the information (including the package insert) that comes with your prescription.  This will include important information about the medicine, its side effects, and any warnings that you need to know about.  The pharmacist who fills the prescription can provide more information and answer questions you may have about the medicine.  If you have questions or concerns that the pharmacist cannot address, please call or return to the Emergency Department.         Opioid Medication Information    Pain medications are among the most commonly " prescribed medicines, so we are including this information for all our patients. If you did not receive pain medication or get a prescription for pain medicine, you can ignore it.     You may have been given a prescription for an opioid (narcotic) pain medicine and/or have received a pain medicine while here in the Emergency Department. These medicines can make you drowsy or impaired. You must not drive, operate dangerous equipment, or engage in any other dangerous activities while taking these medications. If you drive while taking these medications, you could be arrested for DUI, or driving under the influence. Do not drink any alcohol while you are taking these medications.     Opioid pain medications can cause addiction. If you have a history of chemical dependency of any type, you are at a higher risk of becoming addicted to pain medications.  Only take these prescribed medications to treat your pain when all other options have been tried. Take it for as short a time and as few doses as possible. Store your pain pills in a secure place, as they are frequently stolen and provide a dangerous opportunity for children or visitors in your house to start abusing these powerful medications. We will not replace any lost or stolen medicine.  As soon as your pain is better, you should flush all your remaining medication.     Many prescription pain medications contain Tylenol  (acetaminophen), including Vicodin , Tylenol #3 , Norco , Lortab , and Percocet .  You should not take any extra pills of Tylenol  if you are using these prescription medications or you can get very sick.  Do not ever take more than 3000 mg of acetaminophen in any 24 hour period.    All opioids tend to cause constipation. Drink plenty of water and eat foods that have a lot of fiber, such as fruits, vegetables, prune juice, apple juice and high fiber cereal.  Take a laxative if you don t move your bowels at least every other day. Miralax , Milk of  Magnesia, Colace , or Senna  can be used to keep you regular.      Remember that you can always come back to the Emergency Department if you are not able to see your regular doctor in the amount of time listed above, if you get any new symptoms, or if there is anything that worries you.

## 2017-07-05 NOTE — ED AVS SNAPSHOT
Emergency Department    [address] [facility] [address]    Phone:  [phone]    Fax:  [phone]                                       Adriane Flores   MRN: [id number]    Department:   Emergency Department   Date of Visit:  7/5/2017           Patient Information     Date Of Birth          1940        Your diagnoses for this visit were:     Pain of left lower leg     Abdominal pain, generalized        You were seen by Lexy Sparks MD.      Follow-up Information     Follow up with Saul Wheeler MD. Schedule an appointment as soon as possible for a visit in 2 days.    Specialty:  Internal Medicine    Contact information:    [facility]  [address] [facility] [address] [phone]          Follow up with  Emergency Department.    Specialty:  EMERGENCY MEDICINE    Why:  As needed, If symptoms worsen    Contact information:    [address] [facility] [address] [phone]        Discharge Instructions         Discharge Instructions  Extremity Injury    You were seen today for an injury to an extremity (arm, hand, leg, or foot). You may have a bruise, strain, or fracture (broken bone).    Return to the Emergency Department or see your regular doctor if your injured area is not back to normal within 5-7 days.    Return to the Emergency Department right away if:    Your pain seems to change or get worse or there is pain in a new area.    Your extremity becomes pale, cool, blue, or numb or tingling past the injury.    You have more drainage, redness or pain in the area of the cut or abrasion.    You have pain that you can t control with the medicine recommended or prescribed here, or you have pain that seems too much for your injury.    Your child will not stop crying or is much more fussy than normal.    You have new symptoms or anything that worries you.    What to Expect:    Your swelling and pain may be worse the day after your  injury, but should not be severe and should start getting better after that. You should not have new symptoms and your pain should not get worse.    You may start to get a bruise over the injured area or below the injured area.    Your movement and strength should get better with time.    Some injuries may not show up until after you have left the Emergency Department so it is important to follow-up with your regular doctor.    Your injury may prevent you from working.  Follow-up with your regular doctor to get a work release note.    Pain medications or your injury may make it unsafe to drive or operate machinery.    Home Care:    Apply ice your injured area for 15 minutes at a time, at least 3 times a day. Use a cloth between the ice bag and your skin to prevent frostbite.     Do not sleep with an ice pack or heating pad on, since this can cause burns or skin injury.    Rest your injured area for at least 1-2 days. After that you may start using your extremity again as long as there is not too much pain.     Raise the injured area above the level of your heart as much as possible in the first 1-2 days.    Use Tylenol  (acetaminophen), Motrin (ibuprofen), or Advil  (ibuprofen) for your pain unless you have an allergy or are told not to use these medications by your doctor.  Take the medications as instructed on the package. Tylenol  (acetaminophen) is in many prescription medicines and non-prescription medicines--check all of your medicines to be sure you aren t taking more than 3000 mg per day.    You may use an elastic bandage (Ace  Wrap) if it makes you more comfortable. Wrap it just tight enough to provide light compression, like a new pair of socks feels. Loosen the bandage if you have swelling past the bandage.      Please follow any other instructions that were discussed with you by your doctor.    MORE INFORMATION:    X-rays:  X-rays done today were read by your doctor but will also be read by a  radiologist.  We will contact you if the radiologist sees anything different on the x-ray.  Your regular doctor may also want to review your x-rays on follow-up.    You could have a fracture (break), even if we told you your x-rays were normal. X-rays are not always certain, and some fractures are hard to see and may not show up right away.  Also, your x-ray may look like you have a fracture, even though you do not.  It is important to follow-up with your regular doctor.     Stretching:  If your injury was to your arm or shoulder and your doctor put you in a sling or an immobilizer, it is important that you take off your immobilizer within 3 days and stretch/move your shoulder, unless your doctor specifically tells you to not move your shoulder.  This is to prevent further injury such as a  frozen shoulder .     If you were given a prescription for medicine here today, be sure to read all of the information (including the package insert) that comes with your prescription.  This will include important information about the medicine, its side effects, and any warnings that you need to know about.  The pharmacist who fills the prescription can provide more information and answer questions you may have about the medicine.  If you have questions or concerns that the pharmacist cannot address, please call or return to the Emergency Department.     Opioid Medication Information    Pain medications are among the most commonly prescribed medicines, so we are including this information for all our patients. If you did not receive pain medication or get a prescription for pain medicine, you can ignore it.     You may have been given a prescription for an opioid (narcotic) pain medicine and/or have received a pain medicine while here in the Emergency Department. These medicines can make you drowsy or impaired. You must not drive, operate dangerous equipment, or engage in any other dangerous activities while taking these  medications. If you drive while taking these medications, you could be arrested for DUI, or driving under the influence. Do not drink any alcohol while you are taking these medications.     Opioid pain medications can cause addiction. If you have a history of chemical dependency of any type, you are at a higher risk of becoming addicted to pain medications.  Only take these prescribed medications to treat your pain when all other options have been tried. Take it for as short a time and as few doses as possible. Store your pain pills in a secure place, as they are frequently stolen and provide a dangerous opportunity for children or visitors in your house to start abusing these powerful medications. We will not replace any lost or stolen medicine.  As soon as your pain is better, you should flush all your remaining medication.     Many prescription pain medications contain Tylenol  (acetaminophen), including Vicodin , Tylenol #3 , Norco , Lortab , and Percocet .  You should not take any extra pills of Tylenol  if you are using these prescription medications or you can get very sick.  Do not ever take more than 3000 mg of acetaminophen in any 24 hour period.    All opioids tend to cause constipation. Drink plenty of water and eat foods that have a lot of fiber, such as fruits, vegetables, prune juice, apple juice and high fiber cereal.  Take a laxative if you don t move your bowels at least every other day. Miralax , Milk of Magnesia, Colace , or Senna  can be used to keep you regular.      Remember that you can always come back to the Emergency Department if you are not able to see your regular doctor in the amount of time listed above, if you get any new symptoms, or if there is anything that worries you.    Discharge Instructions  Abdominal Pain    Abdominal pain can be caused by many things. Your evaluation today does not show the exact cause for your pain. Your doctor today has decided that it is unlikely your  pain is due to a life threatening problem, or a problem requiring surgery or hospital admission. Sometimes those problems cannot be found right away, so it is very important that you follow up as directed.  Sometimes only the changes which occur over time allow the cause of your pain to be found.    Return to the Emergency Department for a recheck in 8-12 hours if your pain continues.  If your pain gets worse, changes in location, or feels different, return to the Emergency Department right away.    ADULTS:  Return to the Emergency Department right away if:      You get an oral temperature above 102oF or as directed by your doctor.    You have blood in your stools (bright red or black, tarry stools).    You keep throwing up or can t drink liquids.    You see blood when you throw up.    You can t have a bowel movement or you can t pass gas.    Your stomach gets bloated or bigger.    Your skin or the whites of your eyes look yellow.    You faint.    You have bloody, frequent or painful urination.    You have new symptoms or anything that worries you.    CHILDREN:  Return to the Emergency Department right away if your child has any of the above-listed symptoms or the following:      Pushes your hand away or screams/cries when his/her belly is touched.    You notice your child is very fussy or weak.    Your child is very tired and is too tired to eat or drink.    Your child is dehydrated.  Signs of dehydration can be:  o Your infant has had no wet diapers in 4-5 hours.  o Your older child has not passed urine in 6-8 hours.  o Your infant or child starts to have dry mouth and lips, or no saliva or tears.    PREGNANT WOMEN:  Return to the Emergency Department right away if you have any of the above-listed symptoms or the following:      You have bleeding, leaking fluid or passing tissue from the vagina.    You have worse pain or cramping, or pain in your shoulder or back.    You have vomiting that will not stop.    You  have painful or bloody urination.    You have a temperature of 100oF or more.    Your baby is not moving as much as usual.    You faint.    You get a bad headache with or without eye problems and abdominal pain.    You have a convulsion or seizure.    You have unusual discharge from your vagina and abdominal pain.    Abdominal pain is pretty common during pregnancy.  Your pain may or may not be related to your pregnancy. You should follow-up closely with your OB doctor so they can evaluate you and your baby.  Until you follow-up with your regular doctor, do the following:       Avoid sex and do not put anything in your vagina.    Drink clear fluids.    Only take medications approved by your doctor.    MORE INFORMATION:    Appendicitis:  A possible cause of abdominal pain in any person who still has their appendix is acute appendicitis. Appendicitis is often hard to diagnose.  Testing does not always rule out early appendicitis or other causes of abdominal pain. Close follow-up with your doctor and re-evaluations may be needed to figure out the reason for your abdominal pain.    Follow-up:  It is very important that you make an appointment with your clinic and go to the appointment.  If you do not follow-up with your primary doctor, it may result in missing an important development which could result in permanent injury or disability and/or lasting pain.  If there is any problem keeping your appointment, call your doctor or return to the Emergency Department.    Medications:  Take your medications as directed by your doctor today.  Before using over-the-counter medications, ask your doctor and make sure to take the medications as directed.  If you have any questions about medications, ask your doctor.    Diet:  Resume your normal diet as much as possible, but do not eat fried, fatty or spicy foods while you have pain.  Do not drink alcohol or have caffeine.  Do not smoke tobacco.    Probiotics: If you have been given  "an antibiotic, you may want to also take a probiotic pill or eat yogurt with live cultures. Probiotics have \"good bacteria\" to help your intestines stay healthy. Studies have shown that probiotics help prevent diarrhea and other intestine problems (including C. diff infection) when you take antibiotics. You can buy these without a prescription in the pharmacy section of the store.     If you were given a prescription for medicine here today, be sure to read all of the information (including the package insert) that comes with your prescription.  This will include important information about the medicine, its side effects, and any warnings that you need to know about.  The pharmacist who fills the prescription can provide more information and answer questions you may have about the medicine.  If you have questions or concerns that the pharmacist cannot address, please call or return to the Emergency Department.         Opioid Medication Information    Pain medications are among the most commonly prescribed medicines, so we are including this information for all our patients. If you did not receive pain medication or get a prescription for pain medicine, you can ignore it.     You may have been given a prescription for an opioid (narcotic) pain medicine and/or have received a pain medicine while here in the Emergency Department. These medicines can make you drowsy or impaired. You must not drive, operate dangerous equipment, or engage in any other dangerous activities while taking these medications. If you drive while taking these medications, you could be arrested for DUI, or driving under the influence. Do not drink any alcohol while you are taking these medications.     Opioid pain medications can cause addiction. If you have a history of chemical dependency of any type, you are at a higher risk of becoming addicted to pain medications.  Only take these prescribed medications to treat your pain when all other " options have been tried. Take it for as short a time and as few doses as possible. Store your pain pills in a secure place, as they are frequently stolen and provide a dangerous opportunity for children or visitors in your house to start abusing these powerful medications. We will not replace any lost or stolen medicine.  As soon as your pain is better, you should flush all your remaining medication.     Many prescription pain medications contain Tylenol  (acetaminophen), including Vicodin , Tylenol #3 , Norco , Lortab , and Percocet .  You should not take any extra pills of Tylenol  if you are using these prescription medications or you can get very sick.  Do not ever take more than 3000 mg of acetaminophen in any 24 hour period.    All opioids tend to cause constipation. Drink plenty of water and eat foods that have a lot of fiber, such as fruits, vegetables, prune juice, apple juice and high fiber cereal.  Take a laxative if you don t move your bowels at least every other day. Miralax , Milk of Magnesia, Colace , or Senna  can be used to keep you regular.      Remember that you can always come back to the Emergency Department if you are not able to see your regular doctor in the amount of time listed above, if you get any new symptoms, or if there is anything that worries you.         24 Hour Appointment Hotline       To make an appointment at any [facility], call [phone] ([phone]). If you don't have a family doctor or clinic, we will help you find one. [facility] are conveniently located to serve the needs of you and your family.             Review of your medicines      Our records show that you are taking the medicines listed below. If these are incorrect, please call your family doctor or clinic.        Dose / Directions Last dose taken    CALTRATE 600 + D 600-200 MG-IU Tabs   Quantity:  35        1 TABLET BID   Refills:  0        donepezil 5 MG tablet   Commonly known as:  ARICEPT    Dose:  5 mg   Quantity:  90 tablet        Take 1 tablet (5 mg) by mouth At Bedtime   Refills:  3        glucosamine sulfate 1000 MG Caps        1 tablet daily along with 1 500mg tablet   Refills:  0        memantine 10 MG tablet   Commonly known as:  NAMENDA   Dose:  10 mg   Quantity:  180 tablet        Take 1 tablet (10 mg) by mouth 2 times daily   Refills:  3        multivitamin per tablet        1 tablet daily   Refills:  0                Procedures and tests performed during your visit     CBC with platelets + differential    CT Abdomen Pelvis w Contrast    Comprehensive metabolic panel    Tib/Fib XR, left      Orders Needing Specimen Collection     None      Pending Results     Date and Time Order Name Status Description    7/5/2017 1054 CT Abdomen Pelvis w Contrast Preliminary             Pending Culture Results     No orders found from 7/3/2017 to 7/6/2017.            Pending Results Instructions     If you had any lab results that were not finalized at the time of your Discharge, you can call the ED Lab Result RN at [phone]. You will be contacted by this team for any positive Lab results or changes in treatment. The nurses are available 7 days a week from 10A to 6:30P.  You can leave a message 24 hours per day and they will return your call.        Test Results From Your Hospital Stay        7/5/2017 11:29 AM      Narrative     XR TIBIA & FIBULA LT 2 VW  7/5/2017 11:10 AM    HISTORY:  pain    COMPARISON:  None.        Impression     IMPRESSION:  Chronic appearing deformity of the distal fibular shaft  and distal tibial shaft, likely related to old healed trauma.  Otherwise negative. The tibial plateau is not well visualized on the  lateral view.     JARON LEON MD         7/5/2017  2:39 PM      Narrative     CT ABDOMEN AND PELVIS WITH CONTRAST   7/5/2017 2:25 PM     HISTORY: Pain.    TECHNIQUE: CT of the abdomen and pelvis was performed following the  administration of 64 mL Isovue-370. Radiation  dose for this scan was  reduced using automated exposure control, adjustment of the mA and/or  kV according to patient size, or iterative reconstruction technique.    COMPARISON: None.    FINDINGS: Images are slightly degraded due to motion artifact.    The solid organs in the abdomen appear grossly unremarkable. The bowel  appears grossly unremarkable. There is no free fluid or free air in  the abdomen or pelvis. Stable sclerotic lesion involving the right  acetabulum. Degenerative changes in the lumbar spine.        Impression     IMPRESSION: No definite acute abnormality.         7/5/2017 12:02 PM      Component Results     Component Value Ref Range & Units Status    WBC 6.0 4.0 - 11.0 10e9/L Final    RBC Count 4.66 3.8 - 5.2 10e12/L Final    Hemoglobin 14.5 11.7 - 15.7 g/dL Final    Hematocrit 43.7 35.0 - 47.0 % Final    MCV 94 78 - 100 fl Final    MCH 31.1 26.5 - 33.0 pg Final    MCHC 33.2 31.5 - 36.5 g/dL Final    RDW 13.1 10.0 - 15.0 % Final    Platelet Count 242 150 - 450 10e9/L Final    Diff Method Automated Method  Final    % Neutrophils 51.0 % Final    % Lymphocytes 34.0 % Final    % Monocytes 9.5 % Final    % Eosinophils 4.8 % Final    % Basophils 0.5 % Final    % Immature Granulocytes 0.2 % Final    Nucleated RBCs 0 0 /100 Final    Absolute Neutrophil 3.1 1.6 - 8.3 10e9/L Final    Absolute Lymphocytes 2.0 0.8 - 5.3 10e9/L Final    Absolute Monocytes 0.6 0.0 - 1.3 10e9/L Final    Absolute Eosinophils 0.3 0.0 - 0.7 10e9/L Final    Absolute Basophils 0.0 0.0 - 0.2 10e9/L Final    Abs Immature Granulocytes 0.0 0 - 0.4 10e9/L Final    Absolute Nucleated RBC 0.0  Final         7/5/2017 12:18 PM      Component Results     Component Value Ref Range & Units Status    Sodium 143 133 - 144 mmol/L Final    Potassium 4.3 3.4 - 5.3 mmol/L Final    Chloride 107 94 - 109 mmol/L Final    Carbon Dioxide 31 20 - 32 mmol/L Final    Anion Gap 5 3 - 14 mmol/L Final    Glucose 91 70 - 99 mg/dL Final    Urea Nitrogen 14 7 - 30  mg/dL Final    Creatinine 0.81 0.52 - 1.04 mg/dL Final    GFR Estimate 69 >60 mL/min/1.7m2 Final    Non  GFR Calc    GFR Estimate If Black 83 >60 mL/min/1.7m2 Final    African American GFR Calc    Calcium 8.9 8.5 - 10.1 mg/dL Final    Bilirubin Total 0.4 0.2 - 1.3 mg/dL Final    Albumin 3.9 3.4 - 5.0 g/dL Final    Protein Total 7.2 6.8 - 8.8 g/dL Final    Alkaline Phosphatase 69 40 - 150 U/L Final    ALT 19 0 - 50 U/L Final    AST 20 0 - 45 U/L Final                Clinical Quality Measure: Blood Pressure Screening     Your blood pressure was checked while you were in the emergency department today. The last reading we obtained was  BP: 181/57 . Please read the guidelines below about what these numbers mean and what you should do about them.  If your systolic blood pressure (the top number) is less than 120 and your diastolic blood pressure (the bottom number) is less than 80, then your blood pressure is normal. There is nothing more that you need to do about it.  If your systolic blood pressure (the top number) is 120-139 or your diastolic blood pressure (the bottom number) is 80-89, your blood pressure may be higher than it should be. You should have your blood pressure rechecked within a year by a primary care provider.  If your systolic blood pressure (the top number) is 140 or greater or your diastolic blood pressure (the bottom number) is 90 or greater, you may have high blood pressure. High blood pressure is treatable, but if left untreated over time it can put you at risk for heart attack, stroke, or kidney failure. You should have your blood pressure rechecked by a primary care provider within the next 4 weeks.  If your provider in the emergency department today gave you specific instructions to follow-up with your doctor or provider even sooner than that, you should follow that instruction and not wait for up to 4 weeks for your follow-up visit.        Thank you for choosing [facility]      "  Thank you for choosing [facility] for your care. Our goal is always to provide you with excellent care. Hearing back from our patients is one way we can continue to improve our services. Please take a few minutes to complete the written survey that you may receive in the mail after you visit with us. Thank you!        [employer] Information     [employer] lets you send messages to your doctor, view your test results, renew your prescriptions, schedule appointments and more. To sign up, go to [email or web address] . Click on \"Log in\" on the left side of the screen, which will take you to the Welcome page. Then click on \"Sign up Now\" on the right side of the page.     You will be asked to enter the access code listed below, as well as some personal information. Please follow the directions to create your username and password.     Your access code is: [email or web address]  Expires: 10/3/2017  3:28 PM     Your access code will  in 90 days. If you need help or a new code, please call your [facility] clinic or [phone].        Care EveryWhere ID     This is your Care EveryWhere ID. This could be used by other organizations to access your [facility] medical records  [id number]        Equal Access to Services     YVETTE BARRETT : Robyn Santacruz, waaxda janaeadaha, qaybta kaalmada adeatifyada, marco gandhi. So [facility] [phone].    ATENCIÓN: Si habla español, tiene a chris disposición servicios gratuitos de asistencia lingüística. Llame al [phone].    We comply with applicable federal civil rights laws and [place] laws. We do not discriminate on the basis of race, color, national origin, age, disability sex, sexual orientation or gender identity.            After Visit Summary       This is your record. Keep this with you and show to your community pharmacist(s) and doctor(s) at your next visit.                  "

## 2017-07-05 NOTE — ED AVS SNAPSHOT
Emergency Department    [address] [place] [address]    Phone:  [phone]    Fax:  [phone]                                       Adriane Flores   MRN: [id number]    Department:   Emergency Department   Date of Visit:  7/5/2017           After Visit Summary Signature Page     I have received my discharge instructions, and my questions have been answered. I have discussed any challenges I see with this plan with the nurse or doctor.    ..........................................................................................................................................  Patient/Patient Representative Signature      ..........................................................................................................................................  Patient Representative Print Name and Relationship to Patient    ..................................................               ................................................  Date                                            Time    ..........................................................................................................................................  Reviewed by Signature/Title    ...................................................              ..............................................  Date                                                            Time

## 2017-07-10 ENCOUNTER — OFFICE VISIT (OUTPATIENT)
Dept: INTERNAL MEDICINE | Facility: CLINIC | Age: 77
End: 2017-07-10
Payer: COMMERCIAL

## 2017-07-10 VITALS
TEMPERATURE: 97.5 F | SYSTOLIC BLOOD PRESSURE: 122 MMHG | WEIGHT: 121.3 LBS | BODY MASS INDEX: 22.92 KG/M2 | HEART RATE: 100 BPM | DIASTOLIC BLOOD PRESSURE: 84 MMHG | OXYGEN SATURATION: 96 %

## 2017-07-10 DIAGNOSIS — S80.02XD CONTUSION OF LEFT KNEE, SUBSEQUENT ENCOUNTER: Primary | ICD-10-CM

## 2017-07-10 DIAGNOSIS — G30.9 ALZHEIMER'S DEMENTIA WITHOUT BEHAVIORAL DISTURBANCE, UNSPECIFIED TIMING OF DEMENTIA ONSET: ICD-10-CM

## 2017-07-10 DIAGNOSIS — Z87.898 HISTORY OF ABDOMINAL PAIN: ICD-10-CM

## 2017-07-10 DIAGNOSIS — F02.80 ALZHEIMER'S DEMENTIA WITHOUT BEHAVIORAL DISTURBANCE, UNSPECIFIED TIMING OF DEMENTIA ONSET: ICD-10-CM

## 2017-07-10 PROCEDURE — 99213 OFFICE O/P EST LOW 20 MIN: CPT | Performed by: FAMILY MEDICINE

## 2017-07-10 NOTE — MR AVS SNAPSHOT
"              After Visit Summary   7/10/2017    Adriane Flores    MRN: [id number]           Patient Information     Date Of Birth          1940        Visit Information        Provider Department      7/10/2017 2:20 PM Siddharth Mock MD [facility]        Today's Diagnoses     Contusion of left knee, subsequent encounter    -  1    History of abdominal pain        Alzheimer's dementia without behavioral disturbance, unspecified timing of dementia onset           Follow-ups after your visit        Who to contact     If you have questions or need follow up information about today's clinic visit or your schedule please contact [facility] directly at [phone].  Normal or non-critical lab and imaging results will be communicated to you by MyChart, letter or phone within 4 business days after the clinic has received the results. If you do not hear from us within 7 days, please contact the clinic through MyChart or phone. If you have a critical or abnormal lab result, we will notify you by phone as soon as possible.  Submit refill requests through [employer] or call your pharmacy and they will forward the refill request to us. Please allow 3 business days for your refill to be completed.          Additional Information About Your Visit        MyChart Information     [employer] lets you send messages to your doctor, view your test results, renew your prescriptions, schedule appointments and more. To sign up, go to [email or web address] . Click on \"Log in\" on the left side of the screen, which will take you to the Welcome page. Then click on \"Sign up Now\" on the right side of the page.     You will be asked to enter the access code listed below, as well as some personal information. Please follow the directions to create your username and password.     Your access code is: [email or web address]  Expires: 10/3/2017  3:28 PM     Your access code will  in 90 days. If you need help or " a new code, please call your [facility] clinic or [phone].        Care EveryWhere ID     This is your Care EveryWhere ID. This could be used by other organizations to access your [facility] medical records  [id number]        Your Vitals Were     Pulse Temperature Pulse Oximetry BMI (Body Mass Index)          100 97.5  F (36.4  C) (Oral) 96% 22.92 kg/m2         Blood Pressure from Last 3 Encounters:   07/10/17 122/84   07/05/17 181/57   02/23/16 118/70    Weight from Last 3 Encounters:   07/10/17 121 lb 4.8 oz (55 kg)   02/23/16 126 lb (57.2 kg)   01/11/16 123 lb (55.8 kg)              Today, you had the following     No orders found for display       Primary Care Provider Office Phone # Fax #    Saul Wheeler [phone]       [facility] [address] [facility] [address]        Equal Access to Services     YVETTE BARRETT : Hadii aad ku hadasho Soomaali, waaxda luqadaha, qaybta kaalmada adeegyada, waxay idiin hayaan adeeg kharavalarie gramajo . So [facility] [phone].    ATENCIÓN: Si habla español, tiene a chris disposición servicios gratuitos de asistencia lingüística. Llame al [phone].    We comply with applicable federal civil rights laws and [place] laws. We do not discriminate on the basis of race, color, national origin, age, disability sex, sexual orientation or gender identity.            Thank you!     Thank you for choosing [facility]  for your care. Our goal is always to provide you with excellent care. Hearing back from our patients is one way we can continue to improve our services. Please take a few minutes to complete the written survey that you may receive in the mail after your visit with us. Thank you!             Your Updated Medication List - Protect others around you: Learn how to safely use, store and throw away your medicines at www.disposemymeds.org.          This list is accurate as of: 7/10/17  4:11 PM.  Always use your most recent med  list.                   Brand Name Dispense Instructions for use Diagnosis    ATIVAN PO      Take 0.5 mg by mouth every 4 hours as needed for anxiety        CALTRATE 600 + D 600-200 MG-IU Tabs     35    1 TABLET BID        donepezil 5 MG tablet    ARICEPT    90 tablet    Take 1 tablet (5 mg) by mouth At Bedtime    Need for prophylactic vaccination and inoculation against influenza       glucosamine sulfate 1000 MG Caps      1 tablet daily along with 1 500mg tablet    Routine general medical examination at a health care facility, Absence of menstruation, Disorder of bone and cartilage, unspecified, Generalized osteoarthrosis, unspecified site, Flatulence, eructation, and gas pain       memantine 10 MG tablet    NAMENDA    180 tablet    Take 1 tablet (10 mg) by mouth 2 times daily    Alzheimer's dementia without behavioral disturbance, unspecified timing of dementia onset       multivitamin per tablet      1 tablet daily    Routine general medical examination at a health care facility, Absence of menstruation, Disorder of bone and cartilage, unspecified, Generalized osteoarthrosis, unspecified site, Flatulence, eructation, and gas pain

## 2017-07-10 NOTE — PROGRESS NOTES
SUBJECTIVE:                                                    Adriane Flores is a 77 year old female who presents to clinic today for the following health issues:    [facility] ER follow up. Pain has resolved (abdominal and Lt lower leg)      ED/UC Followup:    Facility:  [facility]  Date of visit: 7/5/17   Reason for visit: Abd and Lt lower leg pain  Current Status: Feeling better       E R visit from 7-5-2017 reviewed. June didn't walk and appeared to have abdominal pain. She had a bruised L knee. Lab and abd CT were neg. She is now eating well and bowel function is normal. She is up and around normally.     She is here with her  today.     Patient Active Problem List   Diagnosis     Generalized osteoarthrosis, unspecified site     Disorder of bone and cartilage     Flatulence, eructation, and gas pain     Pure hypercholesterolemia     Hyperlipidemia LDL goal <100     Advanced directives, counseling/discussion     Memory disorder     Alzheimer's disease     Current Outpatient Prescriptions   Medication Sig Dispense Refill     LORazepam (ATIVAN PO) Take 0.5 mg by mouth every 4 hours as needed for anxiety       memantine (NAMENDA) 10 MG tablet Take 1 tablet (10 mg) by mouth 2 times daily 180 tablet 3     donepezil (ARICEPT) 5 MG tablet Take 1 tablet (5 mg) by mouth At Bedtime 90 tablet 3     CALTRATE 600 + D 600-200 MG-IU OR TABS 1 TABLET BID 35 0     MULTIVITAMINS OR TABS 1 tablet daily  0     GLUCOSAMINE SULFATE 1000 MG OR CAPS 1 tablet daily along with 1 500mg tablet  0       REVIEW OF SYSTEMS    Unremarkable except as above.      Past Medical History:   Diagnosis Date     Displacement of intervertebral disc, site unspecified, without myelopathy 1/88    Herniated disc     Int derangement knee NEC     Internal derangement, knee     Int derangement knee NEC     Internal derangement, knee     Lateral epicondylitis  of elbow        EXAM  /84  Pulse 100  Temp 97.5  F (36.4  C) (Oral)  Wt 121 lb 4.8 oz (55  kg)  SpO2 96%  BMI 22.92 kg/m2    Pleasant, smiling woman. NAD  No resp distress  Abd non distended  Resolving ecchymosis L knee 3-4 cm  Ambulates unassisted.      (S80.02XD) Contusion of left knee, subsequent encounter  (primary encounter diagnosis)  Comment: likely why she wasn't walking  Plan:     (Z87.898) History of abdominal pain  Comment: neg W/U, doing OK now.  Plan:     (G30.9,  F02.80) Alzheimer's dementia without behavioral disturbance, unspecified timing of dementia onset  Comment:   Plan:

## 2017-07-10 NOTE — NURSING NOTE
"Chief Complaint   Patient presents with     Clinic Care Coordination - Follow-up     [facility]. Pain has resolved (abdominal and Lt lower leg)       Initial /84  Pulse 100  Temp 97.5  F (36.4  C) (Oral)  Wt 121 lb 4.8 oz (55 kg)  SpO2 96%  BMI 22.92 kg/m2 Estimated body mass index is 22.92 kg/(m^2) as calculated from the following:    Height as of 2/23/16: 5' 1\" (1.549 m).    Weight as of this encounter: 121 lb 4.8 oz (55 kg).  Medication Reconciliation: complete     Claritza Hull CMA      "

## 2017-11-15 ENCOUNTER — TRANSFERRED RECORDS (OUTPATIENT)
Dept: HEALTH INFORMATION MANAGEMENT | Facility: CLINIC | Age: 77
End: 2017-11-15

## 2018-03-19 ENCOUNTER — APPOINTMENT (OUTPATIENT)
Dept: GENERAL RADIOLOGY | Facility: CLINIC | Age: 78
DRG: 470 | End: 2018-03-19
Attending: NURSE PRACTITIONER
Payer: COMMERCIAL

## 2018-03-19 ENCOUNTER — HOSPITAL ENCOUNTER (INPATIENT)
Facility: CLINIC | Age: 78
LOS: 5 days | Discharge: NURSING, REHAB OR LONG-TERM CARE | DRG: 470 | End: 2018-03-24
Attending: NURSE PRACTITIONER | Admitting: HOSPITALIST
Payer: COMMERCIAL

## 2018-03-19 DIAGNOSIS — S72.001A CLOSED FRACTURE OF NECK OF RIGHT FEMUR, INITIAL ENCOUNTER (H): ICD-10-CM

## 2018-03-19 DIAGNOSIS — F02.80 ALZHEIMER'S DEMENTIA WITHOUT BEHAVIORAL DISTURBANCE, UNSPECIFIED TIMING OF DEMENTIA ONSET: Primary | ICD-10-CM

## 2018-03-19 DIAGNOSIS — G30.9 ALZHEIMER'S DEMENTIA WITHOUT BEHAVIORAL DISTURBANCE, UNSPECIFIED TIMING OF DEMENTIA ONSET: Primary | ICD-10-CM

## 2018-03-19 LAB
ABO + RH BLD: NORMAL
ABO + RH BLD: NORMAL
ANION GAP SERPL CALCULATED.3IONS-SCNC: 9 MMOL/L (ref 3–14)
APTT PPP: 28 SEC (ref 22–37)
BASOPHILS # BLD AUTO: 0 10E9/L (ref 0–0.2)
BASOPHILS NFR BLD AUTO: 0.3 %
BLD GP AB SCN SERPL QL: NORMAL
BLOOD BANK CMNT PATIENT-IMP: NORMAL
BUN SERPL-MCNC: 31 MG/DL (ref 7–30)
CALCIUM SERPL-MCNC: 8.1 MG/DL (ref 8.5–10.1)
CHLORIDE SERPL-SCNC: 100 MMOL/L (ref 94–109)
CO2 SERPL-SCNC: 26 MMOL/L (ref 20–32)
CREAT SERPL-MCNC: 0.73 MG/DL (ref 0.52–1.04)
DIFFERENTIAL METHOD BLD: NORMAL
EOSINOPHIL # BLD AUTO: 0.4 10E9/L (ref 0–0.7)
EOSINOPHIL NFR BLD AUTO: 4.5 %
ERYTHROCYTE [DISTWIDTH] IN BLOOD BY AUTOMATED COUNT: 12.5 % (ref 10–15)
GFR SERPL CREATININE-BSD FRML MDRD: 77 ML/MIN/1.7M2
GLUCOSE SERPL-MCNC: 102 MG/DL (ref 70–99)
HCT VFR BLD AUTO: 37.7 % (ref 35–47)
HGB BLD-MCNC: 12.5 G/DL (ref 11.7–15.7)
IMM GRANULOCYTES # BLD: 0 10E9/L (ref 0–0.4)
IMM GRANULOCYTES NFR BLD: 0.2 %
INR PPP: 0.92 (ref 0.86–1.14)
LYMPHOCYTES # BLD AUTO: 2 10E9/L (ref 0.8–5.3)
LYMPHOCYTES NFR BLD AUTO: 20.8 %
MCH RBC QN AUTO: 30.5 PG (ref 26.5–33)
MCHC RBC AUTO-ENTMCNC: 33.2 G/DL (ref 31.5–36.5)
MCV RBC AUTO: 92 FL (ref 78–100)
MONOCYTES # BLD AUTO: 1 10E9/L (ref 0–1.3)
MONOCYTES NFR BLD AUTO: 10.2 %
NEUTROPHILS # BLD AUTO: 6.3 10E9/L (ref 1.6–8.3)
NEUTROPHILS NFR BLD AUTO: 64 %
NRBC # BLD AUTO: 0 10*3/UL
NRBC BLD AUTO-RTO: 0 /100
PLATELET # BLD AUTO: 250 10E9/L (ref 150–450)
POTASSIUM SERPL-SCNC: 4.4 MMOL/L (ref 3.4–5.3)
RBC # BLD AUTO: 4.1 10E12/L (ref 3.8–5.2)
SODIUM SERPL-SCNC: 135 MMOL/L (ref 133–144)
SPECIMEN EXP DATE BLD: NORMAL
WBC # BLD AUTO: 9.8 10E9/L (ref 4–11)

## 2018-03-19 PROCEDURE — 86901 BLOOD TYPING SEROLOGIC RH(D): CPT | Performed by: NURSE PRACTITIONER

## 2018-03-19 PROCEDURE — 93005 ELECTROCARDIOGRAM TRACING: CPT

## 2018-03-19 PROCEDURE — 86850 RBC ANTIBODY SCREEN: CPT | Performed by: NURSE PRACTITIONER

## 2018-03-19 PROCEDURE — 99285 EMERGENCY DEPT VISIT HI MDM: CPT | Mod: 25

## 2018-03-19 PROCEDURE — 73502 X-RAY EXAM HIP UNI 2-3 VIEWS: CPT

## 2018-03-19 PROCEDURE — 80048 BASIC METABOLIC PNL TOTAL CA: CPT | Performed by: NURSE PRACTITIONER

## 2018-03-19 PROCEDURE — 85025 COMPLETE CBC W/AUTO DIFF WBC: CPT | Performed by: NURSE PRACTITIONER

## 2018-03-19 PROCEDURE — 71045 X-RAY EXAM CHEST 1 VIEW: CPT

## 2018-03-19 PROCEDURE — 85730 THROMBOPLASTIN TIME PARTIAL: CPT | Performed by: NURSE PRACTITIONER

## 2018-03-19 PROCEDURE — 73562 X-RAY EXAM OF KNEE 3: CPT | Mod: LT

## 2018-03-19 PROCEDURE — 85610 PROTHROMBIN TIME: CPT | Performed by: NURSE PRACTITIONER

## 2018-03-19 PROCEDURE — 99223 1ST HOSP IP/OBS HIGH 75: CPT | Mod: AI | Performed by: HOSPITALIST

## 2018-03-19 PROCEDURE — 12000007 ZZH R&B INTERMEDIATE

## 2018-03-19 PROCEDURE — 86900 BLOOD TYPING SEROLOGIC ABO: CPT | Performed by: NURSE PRACTITIONER

## 2018-03-19 ASSESSMENT — ENCOUNTER SYMPTOMS
ARTHRALGIAS: 1
CONFUSION: 0

## 2018-03-19 NOTE — IP AVS SNAPSHOT
` [place] [address] [facility]: [phone]            Medication Administration Report for Adriane Flores as of 03/24/18 1359   Legend:    Given Hold Not Given Due Canceled Entry Other Actions    Time Time (Time) Time  Time-Action       Inactive    Active    Linked        Medications 03/18/18 03/19/18 03/20/18 03/21/18 03/22/18 03/23/18 03/24/18      Dose: 325 mg  Freq: EVERY 8 HOURS Route: PO  Start: 03/21/18 0000   End: 03/24/18 0059   Admin Instructions: Do not use if patient has an active opioid/acetaminophen analgesic order for pain  Maximum acetaminophen dose from all sources = 75 mg/kg/day not to exceed 4 grams/day.       (2343)-Not Given        0849 (325 mg)-Given       1544 (325 mg)-Given       2359 (325 mg)-Given        0911 (325 mg)-Given       (1621)-Not Given        0133 (325 mg)-Given       0907 (325 mg)-Given       1839 (325 mg)-Given        0059-Med Discontinued       acetaminophen (TYLENOL) tablet 650 mg  Dose: 650 mg  Freq: EVERY 4 HOURS PRN Route: PO  PRN Reason: mild pain  Start: 03/21/18 1613   Admin Instructions: Maximum acetaminophen dose from all sources = 75 mg/kg/day not to exceed 4 grams/day.               benzocaine-menthol (CHLORASEPTIC) 6-10 MG lozenge 1-2 lozenge  Dose: 1-2 lozenge  Freq: EVERY 1 HOUR PRN Route: BU  PRN Reason: sore throat  PRN Comment: sore throat without fever  Start: 03/20/18 1937              buPROPion (WELLBUTRIN) tablet 75 mg  Dose: 75 mg  Freq: 2 TIMES DAILY Route: PO  Start: 03/24/18 1030   Admin Instructions: Ok to crush           1337 (75 mg)-Given       [ ] 1800           donepezil (ARIcept) tablet 5 mg  Dose: 5 mg  Freq: AT BEDTIME Route: PO  Start: 03/20/18 2200      (2343)-Not Given        2105 (5 mg)-Given        2154 (5 mg)-Given        2136 (5 mg)-Given        [ ] 2200           enoxaparin (LOVENOX) injection 40 mg  Dose: 40 mg  Freq: EVERY 24 HOURS Route: SC  Start: 03/21/18 1400   Admin Instructions: Check to make sure  "start date/time is 12-24 hours post op unless documented complication, AND no sooner than 22 hours post op if spinal anesthesia used.   Continue until discharge to home. HOLD if platelet count falls below 50% of baseline or less than 100,000/ L and notify provider.        1334 (40 mg)-Given        1555 (40 mg)-Given        1347 (40 mg)-Given        1337 (40 mg)-Given           HYDROmorphone (PF) (DILAUDID) injection 0.3-0.5 mg  Dose: 0.3-0.5 mg  Freq: EVERY 2 HOURS PRN Route: IV  PRN Reasons: moderate to severe pain,other  PRN Comment: breakthrough pain  Start: 03/20/18 1937   Admin Instructions: For ordered doses up to 4 mg give IV Push undiluted. Administer each 2mg over 2-5 minutes.               lidocaine (LMX4) cream  Freq: EVERY 1 HOUR PRN Route: Top  PRN Reason: pain  PRN Comment: with VAD insertion or accessing implanted port.  Start: 03/20/18 1937   Admin Instructions: Do NOT give if patient has a history of allergy to any local anesthetic or any \"evelin\" product.   Apply 30 minutes prior to VAD insertion or port access.  MAX Dose:  2.5 g (  of 5 g tube)               lidocaine 1 % 1 mL  Dose: 1 mL  Freq: EVERY 1 HOUR PRN Route: OTHER  PRN Comment: mild pain with VAD insertion or accessing implanted port  Start: 03/20/18 1937   Admin Instructions: Do NOT give if patient has a history of allergy to any local anesthetic or any \"evelin\" product. MAX dose 1 mL subcutaneous OR intradermal in divided doses.               melatonin tablet 1 mg  Dose: 1 mg  Freq: AT BEDTIME Route: PO  Start: 03/21/18 2200       2105 (1 mg)-Given        2154 (1 mg)-Given        2136 (1 mg)-Given        [ ] 2200           memantine XR (NAMENDA XR) 24 hr capsule 28 mg  Dose: 28 mg  Freq: DAILY Route: PO  Start: 03/21/18 0900   Admin Instructions: May open the capsule and sprinkle on applesauce  DO NOT CHEW OR CRUSH. Capsule may be opened and used as sprinkles.        0849 (28 mg)-Given        0911 (28 mg)-Given        0907 (28 " mg)-Given        1052 (28 mg)-Given           QUEtiapine (SEROquel) half-tab 12.5 mg  Dose: 12.5 mg  Freq: 2 TIMES DAILY PRN Route: PO  PRN Comment: give for severe agitation/at danger to self/others during the day, or for persistent insomina, mild agitation at night.  Start: 03/21/18 1620        1156 (12.5 mg)-Given             sodium chloride (PF) 0.9% PF flush 3 mL  Dose: 3 mL  Freq: EVERY 8 HOURS Route: IK  Start: 03/20/18 1945   Admin Instructions: And Q1H PRN, to lock peripheral IV dormant line.       (1948)-Not Given        (0359)-Not Given       (1149)-Not Given       (2106)-Not Given        (0257)-Not Given       (1140)-Not Given       2017 (3 mL)-Given        0521 (3 mL)-Given       (1205)-Not Given       (2139)-Not Given        (0630)-Not Given       [ ] 1300       [ ] 2100           sodium chloride (PF) 0.9% PF flush 3 mL  Dose: 3 mL  Freq: EVERY 1 HOUR PRN Route: IK  PRN Reason: line flush  PRN Comment: for peripheral IV flush post IV meds  Start: 03/20/18 1937              traMADol (ULTRAM) tablet 50 mg  Dose: 50 mg  Freq: EVERY 4 HOURS PRN Route: PO  PRN Reason: moderate pain  Start: 03/21/18 1348       1548 (50 mg)-Given             Discontinued Medications  Medications 03/18/18 03/19/18 03/20/18 03/21/18 03/22/18 03/23/18 03/24/18         Dose: 150 mg  Freq: DAILY Route: PO  Start: 03/21/18 0900   End: 03/24/18 1018   Admin Instructions: DO NOT CRUSH.        (0849)-Not Given        0910 (150 mg)-Given        0907 (150 mg)-Given        1018-Med Discontinued               Dose: 0.5 mg  Freq: EVERY 8 HOURS PRN Route: PO  PRN Reason: anxiety  Indications of Use: ANXIETY  Start: 03/20/18 1937   End: 03/21/18 1618       1618-Med Discontinued            Dose: 1 mg  Freq: AT BEDTIME PRN Route: PO  PRN Reason: sleep  Start: 03/21/18 1615   End: 03/21/18 1616       1616-Med Discontinued            Dose: 0.1-0.4 mg  Freq: EVERY 2 MIN PRN Route: IV  PRN Reason: opioid reversal  Start: 03/20/18 1937   End:  03/21/18 1936   Admin Instructions: For apnea or imminent respiratory arrest: give 0.4 mg IV undiluted Q 2 minutes PRN until desired degree of reversal is obtained, stop opioid and notify provider. Continue monitoring until discharge criteria are met for a minimum of 2 hours.  For severe sedation, decrease in respiratory depth, quality or respiratory rate less than 8: give 0.1 mg IV Q 2 minutes x 3 doses, stop opioid and notify provider.  Try to minimize reversal of analgesia especially in end-of-life patients  For ordered doses up to 2mg give IVP. Give each 0.4mg over 15 seconds in emergency situations. For non-emergent situations further dilute in 9mL of NS to facilitate titration of response.        1936-Med Discontinued            Dose: 12.5 mg  Freq: 2 TIMES DAILY PRN Route: PO  PRN Comment: give for severe agitation @ danger to self/others during the day, or for persistent insomina, mild agitation at night.  Start: 03/21/18 1619   End: 03/21/18 1621       1621-Med Discontinued            Rate: 75 mL/hr   Freq: CONTINUOUS Route: IV  Last Dose: Stopped (03/22/18 1249)  Start: 03/20/18 1945   End: 03/22/18 1212   Admin Instructions: Change to saline lock when PO well tolerated.       1952 ( )-New Bag       2034-Stopped       2334 ( )-Restarted        1345 ( )-New Bag        1212-Med Discontinued  1249-Stopped          Medications 03/18/18 03/19/18 03/20/18 03/21/18 03/22/18 03/23/18 03/24/18

## 2018-03-19 NOTE — IP AVS SNAPSHOT
` [place] [address] [facility]: [phone]                 INTERAGENCY TRANSFER FORM - NOTES (H&P, Discharge Summary, Consults, Procedures, Therapies)   3/19/2018                    Hospital Admission Date: 3/19/2018  MARGO MCLEAN   : 1940  Sex: Female        Patient PCP Information     Provider PCP Type    Saul Wheeler MD General      History & Physicals     No notes of this type exist for this encounter.      Discharge Summaries     No notes of this type exist for this encounter.      Consult Notes     No notes of this type exist for this encounter.         Progress Notes - Physician (Notes from 18 through 18)      Progress Notes by Miranda Cartwright MSW at 3/22/2018  4:15 PM     Author:  Miranda Cartwright MSW Service:  (none) Author Type:      Filed:  3/22/2018  4:16 PM Date of Service:  3/22/2018  4:15 PM Creation Time:  3/22/2018  4:15 PM    Status:  Signed :  Miranda Cartwright MSW ()         D: [facility] can accept patient. According to their admissions, patient's [employer] is primary (perhaps through her 's employer?) so they may need prior auth. They are checking.[NO1.1]     Revision History        User Key Date/Time User Provider Type Action    > NO1.1 3/22/2018  4:16 PM Miranda Cartwright MSW  Sign            Progress Notes by Miranda Cartwright MSW at 3/22/2018  3:14 PM     Author:  Miranda Cartwright MSW Service:  (none) Author Type:      Filed:  3/22/2018  3:20 PM Date of Service:  3/22/2018  3:14 PM Creation Time:  3/22/2018  3:14 PM    Status:  Signed :  Miranda Cartwright MSW ()         Care Transition Initial Assessment -   Reason For Consult: discharge planning  Met with: Family ( Dl)  Active Problems:    Hip fracture, right, closed, initial encounter (H)    Hip fracture requiring operative repair (H)       DATA  Lives With: spouse  Living Arrangements:  house     Who is your support system?: , PCA  Identified issues/concerns regarding health management: SW was consulted for discharge planning. Patient is Adriane, a 77 year-old female who was admitted on 3/19 for a hip fracture. Her tentative discharge date is 3/23. SW spoke to her , Dl, since patient is confused, and reviewed medical record. Per PT luciana, prior to this admission, patient lived with her  in a house with 2 stairs to enter and multiple flights of stairs within. She was independent with ambulating and transferring, but required assistance for toileting, bathing, dressing, and eating. She has PCA services during the day. PT recommended home with 24-hour assist and HHC, or TCU. Junaid's  preferred TCU; he is requesting [facility]. SW placed this referral via DOD. He believes he can transport her at discharge.     Quality Of Family Relationships: supportive, helpful, involved  Transportation Available: car, family or friend will provide  ASSESSMENT  Cognitive Status:  Did not meet with patient due to confusion  Concerns to be addressed: Discharge planning.   PLAN  Financial costs for the patient includes N/A.  Patient given options and choices for discharge TCu choices.  Patient/family is agreeable to the plan?  Yes  Patient Goals and Preferences: Discharge to [facility].  Patient anticipates discharging to:  TCU.[NO1.1]    Miranda Cartwright[NO1.2], TOÑITO MEDELLIN[NO1.1]             Revision History        User Key Date/Time User Provider Type Action    > NO1.2 3/22/2018  3:20 PM Miranda Cartwright MSW  Sign     NO1.1 3/22/2018  3:14 PM Miranda Cartwright MSW              Progress Notes by Miguelina Helton DO at 3/22/2018 11:51 AM     Author:  Miguelina Helton DO Service:  Hospitalist Author Type:  Physician    Filed:  3/22/2018 12:13 PM Date of Service:  3/22/2018 11:51 AM Creation Time:  3/22/2018 11:51 AM    Status:  Signed  :  Miguelina Helton DO (Physician)         [facility]    Hospitalist Progress Note    Assessment & Plan   Adriane Flores is a 77 year old female with PMHx of advanced dementia and osteoporosis with chronic right hip pain who was admitted on 3/19/2018 after a fall with inability to ambulate. She was found to have a right femoral neck fracture with impaction.     R Femoral Neck Fracture with impaction s/p R hip hemiarthroplasty on 3/20/18: POD #2  Likely occurred after a fall sustained 3d prior to presentation. No LOC with fall. Ortho consulted and she underwent a R bipolar hip hemiarthroplasty per Dr. Ellington on 3/20/18.   -- routine postop cares per ortho including pain control and DVT ppx  -- minimize narcotics as able given underlying dementia and predisposition to delirium -- using sched Tylenol (325mg TID), prn Tramadol and prn Tylenol  -- PT/OT following, a[KW1.1]nticipate TCU stay will be needed[KW1.2]     Advanced Alzheimer's Dementia  Resides with her  at home. Has a caregiver 6d/wk (exept for Sundays from 8-5).   -- conts on PTA meds including Namenda, Aricept, Wellbutrin and prn Ativan  -- minimizing narcotic use  -- attempt to maintain sleep/wake cycle, has prn melatonin available  -- has prn Seroquel available for agitation or insomnia        Pain Assessment:  Current Pain Score 3/21/2018 3/21/2018 3/21/2018   Patient currently in pain? sleeping: patient not able to self report yes sleeping: patient not able to self report   Pain score (0-10) - 0 -   Pain location - Hip -   - Adriane is unable to participate in a collaborative plan for pain management due to hip fracture and surgery.   - Please see the plan for pain management as documented above      FEN:[KW1.1] urine output improved -- can dc IVFs[KW1.2], lytes stable, regular diet ordered  DVT Prophylaxis: PCDs and Lovenox per ortho  Code Status: Full Code, as discussed with  on  admission    Disposition: Anticipate discharge in the next 1-2d, pending ortho recommendations. Await therapy recs for discharge plan, though would assume she would benefit from TCU stay.    Miguelina Lynnette Sunitha    Interval History[KW1.1]   Per RN was more fidgity this morning -- pulling at tubes/lines and difficult to redirect. Given dose of Seroquel. Otherwise doing okay. Pain appears controlled. Making urine. Patient pleasantly confused when I saw her, soft mitts on, NAD.[KW1.2]     -Data reviewed today: I reviewed all new labs and imaging results over the last 24 hours. I personally reviewed no images or EKG's today.    Physical Exam   Temp: 98.2  F (36.8  C) Temp src: Axillary BP: 135/65   Heart Rate: 76 Resp: 16 SpO2: 95 % O2 Device: None (Room air)    Vitals:    03/19/18 1933 03/22/18 0702   Weight: 49.9 kg (110 lb) 50.3 kg (111 lb)     Vital Signs with Ranges  Temp:  [98.2  F (36.8  C)-99.5  F (37.5  C)] 98.2  F (36.8  C)  Heart Rate:  [64-95] 76  Resp:  [14-16] 16  BP: (123-142)/(64-71) 135/65  SpO2:  [94 %-99 %] 95 %  I/O last 3 completed shifts:  In: 1768.75 [I.V.:1768.75]  Out: 1275 [Urine:1275]    Constitutional: Resting comfortably, pleasantly confused[KW1.1] -- unable to tell me her name/location/year[KW1.2], NAD  Respiratory: CTAB, no wheeze/rales/rhonchi, no increased work of breathing  Cardiovascular: HRRR, no MGR, no LE edema  GI: S, NT, ND, +BS  Skin/Integumen: warm/dry  Other:      Medications     sodium chloride 75 mL/hr at 03/21/18 1345       melatonin  1 mg Oral At Bedtime     buPROPion  150 mg Oral Daily     donepezil  5 mg Oral At Bedtime     memantine XR  28 mg Oral Daily     sodium chloride (PF)  3 mL Intracatheter Q8H     enoxaparin  40 mg Subcutaneous Q24H     acetaminophen  325 mg Oral Q8H       Data     Recent Labs  Lab 03/22/18  0630 03/21/18  0701 03/20/18  2110 03/19/18  2156   WBC  --   --   --  9.8   HGB 10.9* 11.6*  --  12.5   MCV  --   --   --  92   PLT  --   --  237 250    INR  --   --   --  0.92    138  --  135   POTASSIUM 3.8 4.2  --  4.4   CHLORIDE 109 106  --  100   CO2 25 26  --  26   BUN 12 16  --  31*   CR 0.72 0.74 0.74 0.73   ANIONGAP 7 6  --  9   AWILDA 8.0* 7.9*  --  8.1*   GLC 99 92  --  102*       No results found for this or any previous visit (from the past 24 hour(s)).[KW1.1]       Revision History        User Key Date/Time User Provider Type Action    > KW1.2 3/22/2018 12:13 PM Miguelina Helton DO Physician Sign     KW1.1 3/22/2018 11:51 AM Miguelina Helton DO Physician             Progress Notes by Sophia Carpio PA-C at 3/21/2018 10:18 AM     Author:  Sophia Carpio PA-C Service:  Orthopedics Author Type:  Physician Assistant - C    Filed:  3/22/2018 11:11 AM Date of Service:  3/21/2018 10:18 AM Creation Time:  3/21/2018 10:18 AM    Status:  Addendum :  Sophia Carpio PA-C (Physician Assistant - C)         Orthopedic Surgery  Adriane RUTH ANN Mark  3/21/2018  Admit Date:  3/19/2018  POD #[EC1.1] 1[EC1.2]  S/P[EC1.1] Right femur intertrochanteric fracture open reduction and internal fixation    Patient resting comfortably in bed, PT present to start in bed exercises. Patient is very sleepy    Pain controlled.  Tolerating oral intake.    Denies nausea or vomiting  Denies chest pain or shortness of breath  No events overnight.[EC1.2]     Alert and orient to person[EC1.1] and place with redirection[EC1.3]  Vital Sign Ranges  Temperature Temp  Av.3  F (36.3  C)  Min: 96.1  F (35.6  C)  Max: 98.6  F (37  C)   Blood pressure Systolic (24hrs), Av , Min:104 , Max:152        Diastolic (24hrs), Av, Min:49, Max:95      Pulse Pulse  Av  Min: 60  Max: 60   Respirations Resp  Avg: 15.3  Min: 11  Max: 20   Pulse oximetry SpO2  Av.9 %  Min: 78 %  Max: 100 %[EC1.1]       Dressing is clean, dry, and intact.   Minimal erythema of the surrounding skin.   Bilateral calves are soft, non-tender.  Bilateral lower extremity  is NVI.  Sensation intact bilateral lower extremities  5/5 motor with resisted dorsi and plantar flexion bilaterally[EC1.2]    Labs:  Recent Labs   Lab Test  03/21/18   0701  03/19/18   2156  07/05/17   1145   POTASSIUM  4.2  4.4  4.3     Recent Labs   Lab Test  03/21/18   0701  03/19/18   2156  07/05/17   1145   HGB  11.6*  12.5  14.5     Recent Labs   Lab Test  03/19/18   2156   INR  0.92     Recent Labs   Lab Test  03/20/18   2110  03/19/18   2156 07/05/17   1145   PLT  237  250  242       A/P  1.[EC1.1] Plan[EC1.2]   Continue[EC1.1] Lovenox[EC1.2] for DVT prophylaxis.     Mobilize with PT/OT    WB[EC1.1]AT with assistive device[EC1.2].     Continue current pain regiment.[EC1.1] Try to limit narcotics due to sleepiness   Leave dressing intact[EC1.2]    2. Disposition   Anticipate d/c to[EC1.1] based[EC1.2] on[EC1.1] recommendations from PT as well as progress[EC1.2].[EC1.1] Likely TCU.[EC1.2]    Sophia Carpio PA-C[EC1.1]     Revision History        User Key Date/Time User Provider Type Action    > EC1.3 3/22/2018 11:11 AM Sophia Carpio PA-C Physician Assistant - C Addend     EC1.2 3/21/2018 11:23 AM Sophia Carpio PA-C Physician Assistant - C Sign     EC1.1 3/21/2018 10:18 AM Sophia Carpio PA-C Physician Assistant - C             Progress Notes by Sophia Carpio PA-C at 3/22/2018  9:43 AM     Author:  Sophia Carpio PA-C Service:  Orthopedics Author Type:  Physician Assistant - DA    Filed:  3/22/2018 11:11 AM Date of Service:  3/22/2018  9:43 AM Creation Time:  3/22/2018  9:43 AM    Status:  Signed :  Sophia Carpio PA-C (Physician Assistant - C)         Orthopedic Surgery  Adriane Flores  3/22/2018  Admit Date:  3/19/2018[EC1.1]  POD # 2  S/P Right femur intertrochanteric fracture open reduction and internal fixation     Patient resting comfortably in bed, PT present to start in bed exercises. Patient is very sleepy    Pain controlled.  Tolerating oral intake.     Denies nausea or vomiting  Denies chest pain or shortness of breath  No events overnight.      Alert and orient to person only[EC1.2]  Vital Sign Ranges  Temperature Temp  Av.7  F (37.1  C)  Min: 98.2  F (36.8  C)  Max: 99.5  F (37.5  C)   Blood pressure Systolic (24hrs), Av , Min:123 , Max:142        Diastolic (24hrs), Av, Min:64, Max:71      Pulse No Data Recorded   Respirations Resp  Avg: 15.7  Min: 14  Max: 16   Pulse oximetry SpO2  Av %  Min: 94 %  Max: 99 %[EC1.1]       Dressing is clean, dry, and intact.   Minimal erythema of the surrounding skin.   Bilateral calves are soft, non-tender.  Bilateral lower extremity is NVI.  Sensation intact bilateral lower extremities  5/5 motor with resisted dorsi and plantar flexion bilaterally[EC1.2]    Labs:  Recent Labs   Lab Test  18   0630  18   0701  18   2156   POTASSIUM  3.8  4.2  4.4     Recent Labs   Lab Test  18   0630  18   0701  18   2156   HGB  10.9*  11.6*  12.5     Recent Labs   Lab Test  18   2156   INR  0.92     Recent Labs   Lab Test  18   2110  18   2156  17   1145   PLT  237  250  242[EC1.1]       A/P  1. Plan                        Continue Lovenox for DVT prophylaxis.                          Mobilize with PT/OT                         WBAT with assistive device.                          Continue current pain regiment. Try to limit narcotics due to sleepiness/aggitation/alzheimers                        Leave dressing intact     2. Disposition                        Anticipate d/c to home with home care or TCU pending progress[EC1.2]    Sophia Carpio PA-C[EC1.1]     Revision History        User Key Date/Time User Provider Type Action    > EC1.2 3/22/2018 11:11 AM Sophia Carpio PA-C Physician Assistant - C Sign     EC1.1 3/22/2018  9:43 AM Sophia Carpio PA-C Physician Assistant - C             Progress Notes by Oscar Bobby, PT at 3/21/2018 10:13 AM      Author:  Oscar Bobby, PT Service:  (none) Author Type:  Physical Therapist    Filed:  3/21/2018  7:44 PM Date of Service:  3/21/2018 10:13 AM Creation Time:  3/21/2018  7:44 PM    Status:  Signed :  Oscar Bobby, PT (Physical Therapist)          03/21/18 1013   Quick Adds   Type of Visit Initial PT Evaluation   Living Environment   Lives With spouse  (Primary details provided by patient's , Dl.)   Living Arrangements house  (2.5 story)   Home Accessibility stairs to enter home   Number of Stairs to Enter Home 2  (separate)   Number of Stairs Within Home (full flights between floors.)   Stair Railings at Home inside, present on left side   Transportation Available car;family or friend will provide   Self-Care   Dominant Hand right   Usual Activity Tolerance fair   Current Activity Tolerance fair   Regular Exercise no   Equipment Currently Used at Home none   Functional Level Prior   Ambulation 0-->independent   Transferring 0-->independent   Toileting 2-->assistive person   Bathing 2-->assistive person   Dressing 2-->assistive person   Eating 2-->assistive person   Communication 2-->difficulty understanding and speaking (not related to language barrier)   Swallowing 0-->swallows foods/liquids without difficulty   Cognition 2 - difficulty with organizing thoughts   Fall history within last six months yes   Number of times patient has fallen within last six months 3   Which of the above functional risks had a recent onset or change? fall history   General Information   Onset of Illness/Injury or Date of Surgery - Date 03/20/18   Referring Physician Cayetano Ellington   Patient/Family Goals Statement  states intension to bring patient home as soon as is appropriate.   Pertinent History of Current Problem (include personal factors and/or comorbidities that impact the POC) 77-year-old woman with advanced dementia and osteoporosis, chronic right hip pain, was brought to the ER for  evaluation due to recent fall and unable to ambulate. Closed fracture through the R femoral neck with impaction, likely sustained due to a fall 3 days ago.  No loss of consciousness. Patient is s/p R bipolar hip hemiarthroplasty on 3/20.    Precautions/Limitations fall precautions  (No dislocation precautions due to surgical approach.)   Weight-Bearing Status - RLE weight-bearing as tolerated   General Observations Patient quite somnolent throughout session with exception of momentary alertness during which she only engages with her  with smiles.   Cognitive Status Examination   Orientation other (see comments)  (Patient quite somnolent throughout session with exception of)   Level of Consciousness lethargic/somnolent   Follows Commands and Answers Questions unable to follow commands;unable to follow multi-step instructions;unable to answer questions   Personal Safety and Judgment impaired   Memory impaired   Pain Assessment   Patient Currently in Pain Yes, see Vital Sign flowsheet  (Patient winces with gentle ROM of R L/E.)   Integumentary/Edema   Integumentary/Edema Comments Surgical site covered by postop dressing.   Posture    Posture Comments Unable to assess due to somnolence.   Range of Motion (ROM)   ROM Comment R L/E ROM limited by appearent hip pain.   Strength   Strength Comments Patient noted to be moving her L L/E and both U/Es spntaneously when momentarily aroused.   Bed Mobility   Bed Mobility Bed mobility analysis   Bed Mobility Comments Unable to assess due to somnolence.   Bed Mobility Analysis   Bed Mobility Limitations cognitive deficits   Impairments Contributing to Impaired Bed Mobility decreased flexibility;pain;decreased strength   Transfer Skills   Transfer Comments Unable to assess due to somnolence.   Gait   Gait Comments Unable to assess due to somnolence.   Balance   Balance Comments Unable to assess due to somnolence.   Sensory Examination   Sensory Perception Comments Unable to  "assess due to somnolence.   Coordination   Coordination Comments Unable to assess due to somnolence.   Muscle Tone   Muscle Tone Comments Unable to assess due to somnolence.   General Therapy Interventions   Planned Therapy Interventions bed mobility training;gait training;ROM;strengthening;transfer training;home program guidelines;progressive activity/exercise   Clinical Impression   Criteria for Skilled Therapeutic Intervention yes, treatment indicated   PT Diagnosis Impaired mobility and gait.   Influenced by the following impairments Pain, edema, weakness, and dementia.   Functional limitations due to impairments Limited mobility and gait.   Clinical Presentation Evolving/Changing   Clinical Presentation Rationale Functional assessment and clinical judgement.   Clinical Decision Making (Complexity) Moderate complexity   Therapy Frequency` daily   Predicted Duration of Therapy Intervention (days/wks) 3 days   Anticipated Equipment Needs at Discharge front wheeled walker;wheelchair   Anticipated Discharge Disposition Transitional Care Facility;Home with Assist;Home with Home Therapy   Risk & Benefits of therapy have been explained Yes   Patient, Family & other staff in agreement with plan of care Yes   [facility] [employer] TM \"6 Clicks\"   2016, Trustees of [facility], under license to [employer].  All rights reserved.   6 Clicks Short Forms Basic Mobility Inpatient Short Form   [facility] [employer]  \"6 Clicks\" V.2 Basic Mobility Inpatient Short Form   1. Turning from your back to your side while in a flat bed without using bedrails? 1 - Total   2. Moving from lying on your back to sitting on the side of a flat bed without using bedrails? 1 - Total   3. Moving to and from a bed to a chair (including a wheelchair)? 1 - Total   4. Standing up from a chair using your arms (e.g., wheelchair, or bedside chair)? 1 - Total   5. To walk in hospital room? 1 - Total   6. Climbing 3-5 steps with a railing? 1 " - Total   Basic Mobility Raw Score (Score out of 24.Lower scores equate to lower levels of function) 6   Total Evaluation Time   Total Evaluation Time (Minutes) 15[CL1.1]        Revision History        User Key Date/Time User Provider Type Action    > CL1.1 3/21/2018  7:44 PM Oscar Bobby, PT Physical Therapist Sign            Progress Notes by Ariadne Rodas MD at 3/21/2018  4:08 PM     Author:  Ariadne Rodas MD Service:  Internal Medicine Author Type:  Physician    Filed:  3/21/2018  4:21 PM Date of Service:  3/21/2018  4:08 PM Creation Time:  3/21/2018  4:08 PM    Status:  Signed :  Ariadne Rodas MD (Physician)         [facility]    Hospitalist Progress Note    Date of Service (when I saw the patient): 03/21/2018 Chart Check, Patient in OR    Assessment & Plan    Adriane Flores is a 77-year-old woman with advanced dementia and osteoporosis, chronic right hip pain, was brought to the ER for evaluation due to recent fall and unable to ambulate.       Closed fracture through the R femoral neck with impaction, likely sustained due to a fall 3 days ago.  No loss of consciousness.    - s/p R bipolar hip hemiarthroplasty on 3/20.   - Routine post-operative care, including pain mgt and DVT prophylaxis, per surgical team. On lovenox.  - Using rare tramadol and scheduled low-dose tylenol for pain. Ordered PRN tylenol as well on[MM1.1] 03/21/18[MM1.2]     FEN  - Poor appetite and low UOP. Cr stable. Na 138, K 4.2.  > continue NS @ 75 cc/h on[MM1.1] 03/21/18[MM1.2].    Advanced Alzheimer's Dementia  - Continued on PTA Namenda, Aricept, Bupropion and PRN ativan.  - Minimize narcotics.   - Try to maintain a normal sleep/wake cycle. Melatonin ordered.   - PRN Seroquel available ffor severe agitation/at danger to self/others during the day, or for persistent insomina, mild agitation at night.     DVT prophylaxis -  PCDs for now.  Postop DVT prophylaxis per Orthopedic Surgery.     Code  status Full code,  this was discussed with her .       DISPOSITION:  Anticipate 1-2 more days of hospital stay.  Postop PT, OT evaluation per Orthopedic Surgery,  discharge planning based on postop recovery and therapy evaluation. Likely will need short-term rehab,  consulted.      Ariadne Rodas  [phone] (p)  Text Page (7AM - 6PM)     Interval History   No complaints, Can not give meaningful history due to dementia. Has been comfortable. Appetite low and given IVF boluses overnight for low UOP,Cr stable. See above.     -Data reviewed today: I reviewed all new labs and imaging results over the last 24 hours. I personally reviewed no images or EKG's today.    Physical Exam   Temp: 98.2  F (36.8  C) Temp src: Axillary BP: 141/68 Pulse: 60 Heart Rate: 64 Resp: 14 SpO2: 99 % O2 Device: None (Room air) Oxygen Delivery: 1 LPM  Vitals:    03/19/18 1933   Weight: 49.9 kg (110 lb)     Vital Signs with Ranges  Temp:  [96.1  F (35.6  C)-98.6  F (37  C)] 98.2  F (36.8  C)  Pulse:  [60] 60  Heart Rate:  [46-70] 64  Resp:  [11-20] 14  BP: (104-152)/(49-95) 141/68  SpO2:  [78 %-100 %] 99 %  I/O last 3 completed shifts:  In: 1717.5 [I.V.:1717.5]  Out: 850 [Urine:700; Blood:150]     Constitutional:  NAD, frail.   Neuropsyche: alert and not oriented, does not answers questions appropriately.   Respiratory:Breathing comfortably, good air exchange, no wheezes, no crackles.   Cardiovascular:  Regular rate and rhythm, no edema.  GI:  soft, NT/ND, BS normal  Skin/Integumen:  No acute rash, bruising or sign of bleeding.         Medications   All medications reviewed on 03/21/18    sodium chloride 75 mL/hr at 03/21/18 1345[MM1.1]       melatonin  1 mg Oral At Bedtime     buPROPion  150 mg Oral Daily     donepezil  5 mg Oral At Bedtime     memantine XR  28 mg Oral Daily     sodium chloride (PF)  3 mL Intracatheter Q8H     enoxaparin  40 mg Subcutaneous Q24H     acetaminophen  325 mg Oral Q8H[MM1.3]       Data      Recent Labs  Lab 03/21/18  0701 03/20/18  2110 03/19/18  2156   WBC  --   --  9.8   HGB 11.6*  --  12.5   MCV  --   --  92   PLT  --  237 250   INR  --   --  0.92     --  135   POTASSIUM 4.2  --  4.4   CHLORIDE 106  --  100   CO2 26  --  26   BUN 16  --  31*   CR 0.74 0.74 0.73   ANIONGAP 6  --  9   AWILDA 7.9*  --  8.1*   GLC 92  --  102*       Recent Results (from the past 24 hour(s))   XR Pelvis w Hip Port Right 1 View    Narrative    PORTABLE PELVIS AND RIGHT HIP ONE VIEW   3/20/2018 6:19 PM     HISTORY: Postoperative hip arthroplasty.    COMPARISON: 3/19/2018      Impression    IMPRESSION: Interval right hip arthroplasty. The femoral prosthesis  appears located on the AP projection. The lateral projection is  suboptimal as the acetabulum was not definitely visualized.     JEFFERY MORILLO MD[MM1.1]            Revision History        User Key Date/Time User Provider Type Action    > MM1.3 3/21/2018  4:21 PM Ariadne Rodas MD Physician Sign     MM1.2 3/21/2018  4:14 PM Ariadne Rodas MD Physician      MM1.1 3/21/2018  4:08 PM Ariadne Rodas MD Physician             Progress Notes by Ariadne Rodas MD at 3/20/2018  5:22 PM     Author:  Ariadne Rodas MD Service:  Internal Medicine Author Type:  Physician    Filed:  3/20/2018  5:32 PM Date of Service:  3/20/2018  5:22 PM Creation Time:  3/20/2018  5:22 PM    Status:  Signed :  Ariadne Rodas MD (Physician)         [facility]    Date of Service (when I saw the patient): 03/20/2018[MM1.1] Chart Check, Patient in OR[MM1.2]    Assessment & Plan    Adriane Flores is a 77-year-old woman with advanced dementia and osteoporosis, chronic right hip pain, was brought to the ER for evaluation due to recent fall and unable to ambulate.       Closed fracture through the[MM1.1] R[MM1.2] femoral neck with impaction, likely sustained due to a fall 3 days ago.  No loss of consciousness.[MM1.1]    - In OR  undergoing R bipolar hip fracture. No cardiovascular concerns or otherwise prior to surgery.   - Routine post-operative care, including pain mgt and DVT prophylaxis, per surgical team.     Advanced[MM1.2] Alzheimer's[MM1.1] De[MM1.2]mentia[MM1.1]   - Continued on PTA[MM1.2] Namenda and Aricept[MM1.1] and PRN ativan.  -[MM1.2] Minimize narcotics or benzos as able.[MM1.1]   - Discussed with nursing the importance of maintaining a normal sleep/wake cycle.     DVT[MM1.2] prophylaxis[MM1.1] -[MM1.2]  PCDs for now.  Postop DVT prophylaxis per Orthopedic Surgery.     Code status Full code,  this was discussed with her .       DISPOSITION:  Anticipate 3-4 days of hospital stay.  Postop PT, OT evaluation per Orthopedic Surgery,  discharge planning based on postop recovery and therapy evaluation.  May need short-term rehab,  consulted.  Plan of care discussed with patient's  present in the room.     Ariadne Rodas  [phone] (p)  Text Page (7AM - 6PM)     Interval History[MM1.1]   IN OR[MM1.2]    -Data reviewed today: I reviewed all new labs and imaging results over the last 24 hours. I personally reviewed[MM1.1] no images or EKG's today[MM1.2].    Physical Exam   Temp: 97.4  F (36.3  C) Temp src: Axillary BP: 135/79 Pulse: 62 Heart Rate: 60 Resp: 16 SpO2: 94 % O2 Device: None (Room air)    Vitals:    03/19/18 1933   Weight: 49.9 kg (110 lb)     Vital Signs with Ranges  Temp:  [97  F (36.1  C)-97.9  F (36.6  C)] 97.4  F (36.3  C)  Pulse:  [61-80] 62  Heart Rate:  [60-77] 60  Resp:  [14-16] 16  BP: (134-165)/(53-91) 135/79  SpO2:  [94 %-98 %] 94 %  I/O last 3 completed shifts:  In: 431 [I.V.:431]  Out: -       Medications   All medications reviewed on[MM1.1] 03/20/18[MM1.3], PTA meds signed and held.[MM1.2]      sodium chloride 75 mL/hr at 03/20/18 0110     lactated ringers Stopped (03/20/18 1707)       ceFAZolin  1 g Intravenous See Admin Instructions       Data     Recent Labs  Lab  03/19/18  2156   WBC 9.8   HGB 12.5   MCV 92      INR 0.92      POTASSIUM 4.4   CHLORIDE 100   CO2 26   BUN 31*   CR 0.73   ANIONGAP 9   AWILDA 8.1*   *       Recent Results (from the past 24 hour(s))   XR Pelvis w Hip Right 1 View    Narrative    PELVIS AND HIP RIGHT ONE VIEW   3/19/2018 9:22 PM     HISTORY: Pain.    COMPARISON: 2/23/2016.      Impression    IMPRESSION: Acute impacted fracture through the right femoral neck.  Right femoral head remains located at the hip joint. Left hip shows no  acute abnormality.        JAMES BENTON MD   Knee XR, 3 views, left    Narrative    LEFT KNEE THREE VIEWS     3/19/2018 9:23 PM     HISTORY: Fall with left knee pain and bruising.    COMPARISON: None.      Impression    IMPRESSION: No acute fracture. Anatomic alignment. Small osteophytes  noted.       JAMES BENTON MD   XR Chest 1 View    Narrative    CHEST ONE VIEW   3/19/2018  10:14 PM     HISTORY: Pain.    COMPARISON: Chest CT 12/17/2007.      Impression    IMPRESSION: Pulmonary vasculature is indistinct suggestive of vascular  congestion. Cardiac silhouette within normal limits. No definite focal  airspace opacity. No pleural effusion or pneumothorax identified on  supine film. The bones are unremarkable.    ULICES BRISCOE MD[MM1.1]            Revision History        User Key Date/Time User Provider Type Action    > MM1.3 3/20/2018  5:32 PM Ariadne Rodas MD Physician Sign     MM1.2 3/20/2018  5:25 PM Ariadne Rodas MD Physician      MM1.1 3/20/2018  5:22 PM Ariadne Rodas MD Physician             Progress Notes by Sharon Gamboa RN at 3/20/2018  2:10 PM     Author:  Sharon Gamboa RN Service:  Skilled Nursing Author Type:  Registered Nurse    Filed:  3/20/2018  3:46 PM Date of Service:  3/20/2018  2:10 PM Creation Time:  3/20/2018  3:45 PM    Status:  Signed :  Sharon Gamboa RN (Registered Nurse)         Report given to Preop RN. Pt sent to periop area,  present and  involved with cares.[JE1.1]     Revision History        User Key Date/Time User Provider Type Action    > JE1.1 3/20/2018  3:46 PM Sharon Gamboa, RN Registered Nurse Sign            Progress Notes by Sophia aCrpio PA-C at 3/20/2018  7:27 AM     Author:  Sophia Carpio PA-C Service:  Orthopedics Author Type:  Physician Assistant - C    Filed:  3/20/2018  7:28 AM Date of Service:  3/20/2018  7:27 AM Creation Time:  3/20/2018  7:27 AM    Status:  Signed :  Sophia Carpio PA-C (Physician Assistant - C)         Aware of patient's femur fracture - planning for surgery this afternoon    Keep NPO  Pain medication as needed  Hold all anticoagulation  Bed rest    Formal consult note to follow    Sophia Caprio PAC  [phone][EC1.1]     Revision History        User Key Date/Time User Provider Type Action    > EC1.1 3/20/2018  7:28 AM Sophia Carpio PA-C Physician Assistant - DA Sign            Progress Notes by Sophia Stapleton RN at 3/20/2018 12:04 AM     Author:  Sophia Stapleton RN Service:  (none) Author Type:  Registered Nurse    Filed:  3/20/2018 12:05 AM Date of Service:  3/20/2018 12:04 AM Creation Time:  3/20/2018 12:04 AM    Status:  Signed :  Sophia Stapleton RN (Registered Nurse)         RECEIVING UNIT ED HANDOFF REVIEW    ED Nurse Handoff Report was reviewed by: Sophia Stapleton on March 20, 2018 at 12:04 AM[EK1.1]          Revision History        User Key Date/Time User Provider Type Action    > EK1.1 3/20/2018 12:05 AM Sophia Stapleton RN Registered Nurse Sign            ED Provider Notes by Diamond Mckeon CNP at 3/19/2018  7:27 PM     Author:  Hardwick, Diamond, CNP Service:  Emergency Medicine Author Type:  Nurse Practitioner    Filed:  3/19/2018 11:20 PM Date of Service:  3/19/2018  7:27 PM Creation Time:  3/19/2018  8:38 PM    Status:  Signed :  Diamond Mckeon CNP (Nurse Practitioner)           History     Chief Complaint:[MN1.1]  Fall[MN1.2]     HPI   HPI limited due to  "patient's dementia. HPI provided by patient's .     Adriane Flores is a 77 year old female,[MN1.1] with a history of Alzheimer's and osteoarthrosis[MN1.3], who presents with her  to the ED for evaluation of[MN1.1] mechanical[MN1.3] fall. The patient's  reports the patient fell while walking up the stairs[MN1.1] 3[MN1.3] days ago. She had a caregiver present with her. The  notes she has bruising to her right hip and bilateral knees.[MN1.1] She was ambulatory initially but refused to ambulate today and can bare[PH1.1]ly stand. The  reports the patient has been taking Naproxen[MN1.1] with last dose this morning[PH1.1].[MN1.1]  She ate supper around 6:00pm.[PH1.1]   The  denies any mental status changes, loss of consciousness, head trauma, or other injuries.      Allergies:[MN1.1]  Sulfa drugs[MN1.3]      Medications:[MN1.1]    Ativan   Namenda  Aricept  Caltrate+D  Multvitamins  Glucosamine sulfate[MN1.3]     Past Medical History:[MN1.1]    Herniated disc   Elbow lateral epicondylitis   Knee internal derangement   Alzheimer's  Dementia  HLD  Osteoarthrosis  Bone & cartilage disorder[MN1.3]    Past Surgical History:[MN1.1]    T&A  Right keratectomy   Tennis elbow surgery  Left leg surgery[MN1.3]    Family History:[MN1.1]    Alcohol/drug  CVA[MN1.3]    Social History:[MN1.1]   Smoking status: Former smoker, Quit date 8/11/1970  Alcohol use: No  Presents to ED with [MN1.3]  Marital Status:   [2]     Review of Systems   Musculoskeletal: Positive for[MN1.1] arthralgias[MN1.3] and[MN1.1] gait problem[MN1.3].   Neurological: Negative for[MN1.1] syncope[MN1.3].   Psychiatric/Behavioral: Negative for[MN1.1] confusion[MN1.3].     Physical Exam[MN1.1]     Patient Vitals for the past 24 hrs:   BP Temp Temp src Heart Rate Resp SpO2 Height Weight   03/19/18 1933 146/70 97  F (36.1  C) Oral 77 16 94 % 1.575 m (5' 2\") 49.9 kg (110 lb)[MN1.2]     Physical Exam[MN1.1]  Nursing " notes reviewed. Vitals reviewed.  General: Alert. Well kept.  Eyes:  Conjunctiva non-injected, non-icteric.  Ears:TM s normal.  Neck/Throat: Moist mucous membranes, oropharynx clear without erythema or exudate. No cervical lymphadenopathy.  Normal voice.  Cardiac: Regular rhythm. Normal heart sounds with no murmur/rubs/click.   Pulmonary: Clear and equal breath sounds bilaterally. No crackles/rales. No wheezing  Abdomen: Soft. Non-distended. Non-tender to palpation. No masses. No guarding or rebound.  Musculoskeletal: right lower extremity shortened and rotated.  Full ROM of left lower extremity and bilateral upper extremities without pain.  No pain to palpation or with ROM of neck or to palpation of thoracic or lumbar spine.    Neurological: Alert and oriented x4.   Skin: Warm and dry without rashes or petechiae. Bruising on left knee and on right hip and right buttock.   Psych: Affect normal. Good eye contact.[PH1.1]    Emergency Department Course[MN1.1]     ECG (21:43:56):  Rate 69 bpm. WV interval 194. QRS duration 72. QT/QTc 422/452. P-R-T axes 83 23 62. Normal sinus rhythm. Cannot rule out anterior infarct, age undetermined. Abnormal ECG. Interpreted at 2148 by Dr. Murphy and reviewed by Diamond Mckeon, CNP.[MN1.4]    Imaging:[MN1.1]  Radiographic findings were communicated with the patient and family who voiced understanding of the findings.    XR Pelvis w Hip Right 1 View  IMPRESSION: Acute impacted fracture through the right femoral neck.  Right femoral head remains located at the hip joint. Left hip shows no  acute abnormality. As read by Radiology.     Knee XR, 3 Views, Left  IMPRESSION: No acute fracture. Anatomic alignment. Small osteophytes  noted. As read by Radiology.     XR Chest 1 View  IMPRESSION: Pulmonary vasculature is indistinct suggestive of vascular  congestion. Cardiac silhouette within normal limits. No definite focal  airspace opacity. No pleural effusion or pneumothorax identified  on  supine film. The bones are unremarkable. As read by Radiology.[MN1.3]     Laboratory:[MN1.1]  INR: 0.92  PTT: 28  ABO/Rh type & screen:[MN1.3] O Pos[PH1.1]  CBC: o/w WNL (WBC 9.8, HGB 12.5, )  BMP: Glucose 102(H), BUN 31(H), Calcium 8.1(L), o/w WNL (Creatinine 0.73)[MN1.3]     Interventions:[MN1.1]  None[PH1.1]    Emergency Department Course:[MN1.1]  Past medical records, nursing notes, and vitals reviewed.  2038: I performed an exam of the patient and obtained history, as documented above.    The patient was sent for a[MN1.3] XRay[PH1.1] while in the emergency department, findings above.    IV inserted and blood drawn.    2135: I rechecked the patient. Explained findings to patient and .[MN1.3]    2155[MN1.5]: I discussed the patient with Dr. Gino Murphy, in shared service, who also evaluated the patient.    2218: I spoke with Dr. Ellington of orthopedic surgery.[MN1.3]  He will plan on surgery in the am at 9:00.    2315[PH1.1]: I spoke to [MN1.3] Pradeep[PH1.1] of the hospitalist service who accepts the patient for admission.     Findings and plan explained to the Patient and spouse who consents to admission. Discussed the patient with [MN1.3] Pradeep[PH1.1], who will admit the patient to a[MN1.3]n orthopedic[PH1.1] bed for further monitoring, evaluation, and treatment.[MN1.3]     Impression & Plan      Medical Decision Making:[MN1.1]  Adriane Flores 77 year old female who presents for evaluation of right hip pain after mechanical fall 3 days ago. The patient[MN1.3]'s [PH1.1] notes that she had bruising along the right hip and right buttock, and today refused to ambulate on her leg. The patient does have underlying dementia, but was with a caretaker when she had the fall. They denied loss of consciousness or mental status changes. On physical exam, she has a right leg shortening and rotation[MN1.3] with[PH1.1] tenderness to palpation over the right hip, and bruising over the right  posterior and lateral hip. She also has bruising over the left knee. X-ray today for the left knee was negative for any acute findings. Pelvis and hip x-ray shows impacted fracture through the right femoral neck with right femoral head within the hip joint. Lab work today is unremarkable. I will not place a catheter in the patient as she is not continent of urine and always wears[MN1.3] incontinence briefs[PH1.1]. With her underlying dementia, I am concerned[MN1.3] catheter would[PH1.1] cause increased agitation. She's comfortable resting in bed, and there's no indication for additional pain medication today. I spoke with Dr. Ellington from orthopedic surgery in regards to surgery, and I also spoke with [MN1.3] Pradeep[PH1.1] hospitalist who has accepted her for admission.[MN1.3]     Diagnosis:[MN1.1]    ICD-10-CM    1. Closed fracture of neck of right femur, initial encounter (H) S72.001A     Acute impacted fracture through the right femoral neck. Right femoral head remains located at the hip joint[PH1.2]     Disposition:[MN1.1] Patient admitted to a[MN1.3]n orthopedic[PH1.1] bed by [MN1.3] Jonh[PH1.1]    Courtney Arguello  3/19/2018    EMERGENCY DEPARTMENT    I, Courtney Arguello, am serving as a scribe at 8:38 PM on 3/19/2018 to document services personally performed by Diamond Mckeon CNP based on my observations and the provider's statements to me.[MN1.1]     Emergency Department Attending Supervision Note    3/19/2018  9:57 PM    I evaluated this patient in conjunction with Diamond Mckeon CNP    Briefly, the patient presented with fall and hip injury.    My exam:  General: Resting uncomfortably on the gurney  Head:  The scalp, face, and head appear normal  Eyes:  The pupils are normal    Conjunctivae and sclera appear normal  MS:  There is pain and tenderness involving the right hip.    The patient can move her foot and ankle without difficulty.  MH:  Patient has dementia.    MDM: Patient presents after a  fall.  The patient has a right femoral neck fracture which will require operative management.  She will be admitted to the hospital to the hospitalist with orthopedic surgery consulting.    My impression/diagnosis is:    Mildly displaced and impacted right femoral neck fracture.    Gino Murphy MD  Emergency Physician[MR1.1]           Diamond Mckeon CNP  03/19/18 2320  [PH1.3]     Revision History        User Key Date/Time User Provider Type Action    > PH1.3 3/19/2018 11:20 PM PlymouthDiamond griffin, CNP Nurse Practitioner Sign     PH1.2 3/19/2018 11:16 PM PlymouthL[place], CNP Nurse Practitioner      PH1.1 3/19/2018 11:08 PM Diamond Mckeon, CNP Nurse Practitioner      MN1.2 3/19/2018 10:44 PM Courtney Arguelloe Share     MN1.3 3/19/2018 10:20 PM Courtney Arguello      MN1.4 3/19/2018 10:03 PM Courtney Arguelloe [place]     [N/A] 3/19/2018  9:58 PM Gino Murphy MD Physician Share     MN1.5 3/19/2018  9:56 PM Courtney Arguelloibe Share     MR1.1 3/19/2018  9:56 PM Gino Murphy MD Physician      MN1.1 3/19/2018  8:45 PM Courtney Arguelloe Share            ED Notes by Angel South RN at 3/19/2018 10:16 PM     Author:  Angel South RN Service:  ED Special Procedures Author Type:  Registered Nurse    Filed:  3/19/2018 10:20 PM Date of Service:  3/19/2018 10:16 PM Creation Time:  3/19/2018 10:20 PM    Status:  Signed :  Angel South RN (Registered Nurse)         Report give to RN[SR1.1]     Revision History        User Key Date/Time User Provider Type Action    > SR1.1 3/19/2018 10:20 PM Angel South RN Registered Nurse Sign            ED Notes by La Esquivel RN at 3/19/2018 10:19 PM     Author:  La Esquivel RN Service:  (none) Author Type:  Registered Nurse    Filed:  3/19/2018 10:19 PM Date of Service:  3/19/2018 10:19 PM Creation Time:  3/19/2018 10:19 PM    Status:  Signed :  La Esquivel RN (Registered Nurse)         Bed: ED20  Expected date:  "  Expected time:   Means of arrival:   Comments:  Save for FT 4     Revision History        User Key Date/Time User Provider Type Action    > SH1.1 3/19/2018 10:19 PM La Esquivel RN Registered Nurse Sign            ED Notes by Angel South RN at 3/19/2018 10:01 PM     Author:  Angel South RN Service:  ED Special Procedures Author Type:  Registered Nurse    Filed:  3/19/2018 10:06 PM Date of Service:  3/19/2018 10:01 PM Creation Time:  3/19/2018 10:01 PM    Status:  Signed :  Angel South RN (Registered Nurse)         [facility]  ED Nurse Handoff Report    ED Chief complaint: Fall (Fell while walking up the stairs on Thurs. Bruising noted on the R hip and L knee. worsening ambulation. Hx of dementia. )      ED Diagnosis:   Final diagnoses:   None       Code Status: Full Code    Allergies:   Allergies   Allergen Reactions     Sulfa Drugs      30 yrs ago didn't feel well       Activity level - Baseline/Home:  Independent    Activity Level - Current:   Total Care     Needed?: No    Isolation: No  Infection: Not Applicable    Bariatric?: No    Vital Signs:   Vitals:    03/19/18 1933   BP: 146/70   Resp: 16   Temp: 97  F (36.1  C)   TempSrc: Oral   SpO2: 94%   Weight: 49.9 kg (110 lb)   Height: 1.575 m (5' 2\")       Cardiac Rhythm: ,        Pain level:      Is this patient confused?: Yes, hx of dementia.    Patient Report: Initial Complaint: Adriane Flores is a 77 year old female who presents with her  to the ED for evaluation of fall. Pt has hx of dementia. Pt's  reports that the pt had fell last Thursday while walking up stairs.  reports that the pt was with the caregiver at the time. Since the fall, the pt has not been able to ambulate.  states that the pt is at her baseline and denies LOC, head trauma, or any other injuries.  Focused Assessment: Cardiac: WDL  Resp: WDL  Neuro: Confused, pleasantly demented.   Musculoskeletal: Bruising to R " hip and bilat knees  Tests Performed: Labs, EKG, Xray  Abnormal Results: See Xray  Treatments provided: N/A    Family Comments:  at bedside    OBS brochure/video discussed/provided to patient: No    ED Medications: Medications - No data to display    Drips infusing?:  No    For the majority of the shift this patient was Green.   Interventions performed were monitoring, comfort measures.    Severe Sepsis OR Septic Shock Diagnosis Present: No      ED NURSE PHONE NUMBER: *[id number][SR1.1]            Revision History        User Key Date/Time User Provider Type Action    > SR1.1 3/19/2018 10:06 PM Angel South, RN Registered Nurse Sign                  Procedure Notes     No notes of this type exist for this encounter.         Progress Notes - Therapies (Notes from 03/19/18 through 03/22/18)      Progress Notes by Oscar Bobby PT at 3/21/2018 10:13 AM     Author:  Oscar Bobby, PT Service:  (none) Author Type:  Physical Therapist    Filed:  3/21/2018  7:44 PM Date of Service:  3/21/2018 10:13 AM Creation Time:  3/21/2018  7:44 PM    Status:  Signed :  Oscar Bobby, PT (Physical Therapist)          03/21/18 1013   Quick Adds   Type of Visit Initial PT Evaluation   Living Environment   Lives With spouse  (Primary details provided by patient's Dl.)   Living Arrangements house  (2.5 story)   Home Accessibility stairs to enter home   Number of Stairs to Enter Home 2  (separate)   Number of Stairs Within Home (full flights between floors.)   Stair Railings at Home inside, present on left side   Transportation Available car;family or friend will provide   Self-Care   Dominant Hand right   Usual Activity Tolerance fair   Current Activity Tolerance fair   Regular Exercise no   Equipment Currently Used at Home none   Functional Level Prior   Ambulation 0-->independent   Transferring 0-->independent   Toileting 2-->assistive person   Bathing 2-->assistive person   Dressing 2-->assistive  person   Eating 2-->assistive person   Communication 2-->difficulty understanding and speaking (not related to language barrier)   Swallowing 0-->swallows foods/liquids without difficulty   Cognition 2 - difficulty with organizing thoughts   Fall history within last six months yes   Number of times patient has fallen within last six months 3   Which of the above functional risks had a recent onset or change? fall history   General Information   Onset of Illness/Injury or Date of Surgery - Date 03/20/18   Referring Physician Cayetano Ellington   Patient/Family Goals Statement  states intension to bring patient home as soon as is appropriate.   Pertinent History of Current Problem (include personal factors and/or comorbidities that impact the POC) 77-year-old woman with advanced dementia and osteoporosis, chronic right hip pain, was brought to the ER for evaluation due to recent fall and unable to ambulate. Closed fracture through the R femoral neck with impaction, likely sustained due to a fall 3 days ago.  No loss of consciousness. Patient is s/p R bipolar hip hemiarthroplasty on 3/20.    Precautions/Limitations fall precautions  (No dislocation precautions due to surgical approach.)   Weight-Bearing Status - RLE weight-bearing as tolerated   General Observations Patient quite somnolent throughout session with exception of momentary alertness during which she only engages with her  with smiles.   Cognitive Status Examination   Orientation other (see comments)  (Patient quite somnolent throughout session with exception of)   Level of Consciousness lethargic/somnolent   Follows Commands and Answers Questions unable to follow commands;unable to follow multi-step instructions;unable to answer questions   Personal Safety and Judgment impaired   Memory impaired   Pain Assessment   Patient Currently in Pain Yes, see Vital Sign flowsheet  (Patient winces with gentle ROM of R L/E.)   Integumentary/Edema    Integumentary/Edema Comments Surgical site covered by postop dressing.   Posture    Posture Comments Unable to assess due to somnolence.   Range of Motion (ROM)   ROM Comment R L/E ROM limited by appearent hip pain.   Strength   Strength Comments Patient noted to be moving her L L/E and both U/Es spntaneously when momentarily aroused.   Bed Mobility   Bed Mobility Bed mobility analysis   Bed Mobility Comments Unable to assess due to somnolence.   Bed Mobility Analysis   Bed Mobility Limitations cognitive deficits   Impairments Contributing to Impaired Bed Mobility decreased flexibility;pain;decreased strength   Transfer Skills   Transfer Comments Unable to assess due to somnolence.   Gait   Gait Comments Unable to assess due to somnolence.   Balance   Balance Comments Unable to assess due to somnolence.   Sensory Examination   Sensory Perception Comments Unable to assess due to somnolence.   Coordination   Coordination Comments Unable to assess due to somnolence.   Muscle Tone   Muscle Tone Comments Unable to assess due to somnolence.   General Therapy Interventions   Planned Therapy Interventions bed mobility training;gait training;ROM;strengthening;transfer training;home program guidelines;progressive activity/exercise   Clinical Impression   Criteria for Skilled Therapeutic Intervention yes, treatment indicated   PT Diagnosis Impaired mobility and gait.   Influenced by the following impairments Pain, edema, weakness, and dementia.   Functional limitations due to impairments Limited mobility and gait.   Clinical Presentation Evolving/Changing   Clinical Presentation Rationale Functional assessment and clinical judgement.   Clinical Decision Making (Complexity) Moderate complexity   Therapy Frequency` daily   Predicted Duration of Therapy Intervention (days/wks) 3 days   Anticipated Equipment Needs at Discharge front wheeled walker;wheelchair   Anticipated Discharge Disposition Transitional Care Facility;Home  "with Assist;Home with Home Therapy   Risk & Benefits of therapy have been explained Yes   Patient, Family & other staff in agreement with plan of care Yes   [facility]-Loc[facility] within [facility] TM \"6 Clicks\"   2016, [employer] [facility], under license to [employer].  All rights reserved.   6 Clicks Short Forms Basic Mobility Inpatient Short Form   [facility] AM-PAC  \"6 Clicks\" V.2 Basic Mobility Inpatient Short Form   1. Turning from your back to your side while in a flat bed without using bedrails? 1 - Total   2. Moving from lying on your back to sitting on the side of a flat bed without using bedrails? 1 - Total   3. Moving to and from a bed to a chair (including a wheelchair)? 1 - Total   4. Standing up from a chair using your arms (e.g., wheelchair, or bedside chair)? 1 - Total   5. To walk in hospital room? 1 - Total   6. Climbing 3-5 steps with a railing? 1 - Total   Basic Mobility Raw Score (Score out of 24.Lower scores equate to lower levels of function) 6   Total Evaluation Time   Total Evaluation Time (Minutes) 15[CL1.1]        Revision History        User Key Date/Time User Provider Type Action    > CL1.1 3/21/2018  7:44 PM Oscar Bobby, PT Physical Therapist Sign            "

## 2018-03-19 NOTE — IP AVS SNAPSHOT
"` `           [place] [address] [facility]: [phone]                                              INTERAGENCY TRANSFER FORM - NURSING   3/19/2018                    Hospital Admission Date: 3/19/2018  MARGO MCLEAN   : 1940  Sex: Female        Attending Provider: Cayetano Ellington MD     Allergies:  Sulfa Drugs    Infection:  None   Service:  HOSPITALIST    Ht:  1.575 m (5' 2\")   Wt:  50.3 kg (111 lb)   Admission Wt:  49.9 kg (110 lb)    BMI:  20.3 kg/m 2   BSA:  1.48 m 2            Patient PCP Information     Provider PCP Type    Saul Wheeler MD General      Current Code Status     Date Active Code Status Order ID Comments User Context       Prior      Code Status History     Date Active Date Inactive Code Status Order ID Comments User Context    3/24/2018 10:51 AM  Full Code [id number]  Miguelina Helton DO Outpatient    3/20/2018  7:37 PM 3/24/2018 10:51 AM Full Code [id number]  Cayetano Ellington MD Inpatient    3/20/2018 12:56 AM 3/20/2018  7:37 PM Full Code [id number]  Margo Fernández MD Inpatient      Advance Directives        Scanned docmt in ACP Activity?           No scanned doc        Hospital Problems as of 3/24/2018              Priority Class Noted POA    Hip fracture, right, closed, initial encounter (H) Medium  3/20/2018 Yes    Hip fracture requiring operative repair (H) Medium  3/20/2018 Yes      Non-Hospital Problems as of 3/24/2018              Priority Class Noted    Generalized osteoarthrosis, unspecified site Medium  2003    Disorder of bone and cartilage Medium  2003    Flatulence, eructation, and gas pain Low  2003    Pure hypercholesterolemia Medium  2007    Hyperlipidemia LDL goal <100 Medium  10/31/2010    Advanced directives, counseling/discussion Low  1/3/2012    Memory disorder High  2012    Alzheimer's disease High  2013      Immunizations     Name Date      Influenza (High Dose) 3 valent vaccine 11/15/17     " Influenza (High Dose) 3 valent vaccine 10/11/15     Influenza (High Dose) 3 valent vaccine 10/15/13     Influenza (IIV3) PF 10/15/12     Influenza (IIV3) PF 10/15/11     Influenza (IIV3) PF 11/08/10     Influenza (IIV3) PF 11/15/09     Influenza (IIV3) PF 11/15/07     Influenza (IIV3) PF 12/16/05     Influenza (IIV3) PF 11/03/97     Pneumo Conj 13-V (2010&after) 02/18/15     Pneumococcal 23 valent 12/01/09     TD (ADULT, 7+) 12/03/07     TDAP Vaccine (Adacel) 08/01/14          END      ASSESSMENT     Discharge Profile Flowsheet     EXPECTED DISCHARGE     SKIN      Expected Discharge Date  03/23/18 (to 3/24 TCU) 03/23/18 0828   Inspection of bony prominences  Full 03/24/18 0924    DISCHARGE NEEDS ASSESSMENT     Skin WDL  ex 03/24/18 0924    Equipment Currently Used at Home  none 03/21/18 1024   Skin Temperature  warm 03/24/18 0442    Transportation Available  car;family or friend will provide 03/21/18 1024   Skin Moisture  dry 03/24/18 0442    GASTROINTESTINAL (ADULT,PEDIATRIC,OB)     Skin Elasticity  quick return to original state 03/23/18 2037    GI WDL  ex 03/24/18 0924   Skin Integrity  bruise(s);incision(s) 03/24/18 0924    All Quadrants Bowel Sounds  hypoactive 03/23/18 1808   Procedural focused assessment (identify areas inspected)   Scapula, left;Scapula, right;Elbow, left;Elbow, right;Knee, right;Knee, left;Heel, right;Heel, left;Coccyx 03/20/18 1646    GI Signs/Symptoms  fecal incontinence 03/24/18 0924   Inspection under devices  Full 03/22/18 1052    Passing flatus  yes 03/24/18 0924   Not Inspected under devices  -- (Dressing, mitts) 03/21/18 2009    COMMUNICATION ASSESSMENT     Full except areas not inspected   -- (UTV under incisional dressing ) 03/23/18 0932    Patient's communication style  spoken language (English or Bilingual) 03/19/18 1931   SAFETY      FINAL RESOURCES     Safety WDL  WDL 03/24/18 0924    PAS Number  [id number] 03/23/18 1558   All Alarms  alarm(s) activated and audible 03/24/18 0989  "   Senior Linkage Line Referral Placed  03/23/18 03/23/18 8718   Safety Factors  upper side rails raised x 2;bed in low position;wheels locked;call light in reach;ID band on 03/23/18 1808                 Assessment WDL (Within Defined Limits) Definitions           Safety WDL     Effective: 09/28/15    Row Information: <b>WDL Definition:</b> Bed in low position, wheels locked; call light in reach; upper side rails up x 2; ID band on<br> <font color=\"gray\"><i>Item=AS safety wdl>>List=AS safety wdl>>Version=F14</i></font>      Skin WDL     Effective: 09/28/15    Row Information: <b>WDL Definition:</b> Warm; dry; intact; elastic; without discoloration; pressure points without redness<br> <font color=\"gray\"><i>Item=AS skin wdl>>List=AS skin wdl>>Version=F14</i></font>      Vitals     Vital Signs Flowsheet     VITAL SIGNS     Side Effects Monitoring: Respiratory Quality  R 03/24/18 0926    Temp  97.7  F (36.5  C) 03/24/18 1206   Side Effects Monitoring: Respiratory Depth  N 03/24/18 0926    Temp src  Axillary 03/24/18 1206   Side Effects Monitoring: Sedation Level  1 03/24/18 0926    Resp  16 03/24/18 1206   HEIGHT AND WEIGHT      Pulse  65 03/24/18 1206   Height  1.575 m (5' 2\") 03/19/18 1934    Heart Rate  63 03/24/18 0832   Height Method  Stated 03/19/18 1934    Pulse/Heart Rate Source  Monitor 03/24/18 1206   Weight  50.3 kg (111 lb) 03/22/18 0703    BP  121/61 03/24/18 1206   Weight Method  Bed scale 03/22/18 0703    BP Location  Left arm 03/24/18 1206   Bed Scale  Standard (fitted sheet, draw sheet/ pad, cover/flat sheet, blanket, two pillows) 03/22/18 0703    OXYGEN THERAPY     BSA (Calculated - sq m)  1.48 03/19/18 1934    SpO2  96 % 03/24/18 1206   BMI (Calculated)  20.16 03/19/18 1934    O2 Device  None (Room air) 03/24/18 1206   KIER COMA SCALE      Oxygen Delivery  1 LPM 03/20/18 2215   Best Eye Response  4-->(E4) spontaneous 03/23/18 1808    PAIN/COMFORT     Best Motor Response  5-->(M5) localizes " pain 03/23/18 1808    Patient Currently in Pain  sleeping: patient not able to self report 03/21/18 1646   Best Verbal Response  4-->(V4) confused 03/23/18 1808    0-10 Pain Scale  0 03/21/18 1620   Elbert Coma Scale Score  13 03/23/18 1808    PAINAD Breathing  0-->normal 03/24/18 0925   POSITIONING      PAINAD Negative Vocalization  0-->none 03/24/18 0925   Body Position  supine, head elevated 03/24/18 1206    PAINAD Facial Expression  0-->smiling or inexpressive 03/24/18 0925   Head of Bed (HOB)  HOB at 30-45 degrees 03/24/18 1206    PAINAD Body Language  0-->relaxed 03/24/18 0925   Positioning/Transfer Devices  pillows;in use 03/24/18 1206    PAINAD Consolability  0-->no need to console 03/24/18 0925   DAILY CARE      PAINAD Score  0 03/24/18 0925   Activity Management  activity adjusted per tolerance 03/24/18 0924    Pain Location  Hip 03/21/18 1620   Activity Assistance Provided  assistance, 2 people 03/24/18 0924    Pain Orientation  Right 03/21/18 1620   RESPIRATORY MONITORING      Pain Intervention(s)  Cold applied 03/23/18 0530   Respiratory Monitoring (EtCO2)  35 mmHg 03/21/18 1104    ANALGESIA SIDE EFFECTS MONITORING     Integrated Pulmonary Index (IPI)  8-9 03/21/18 1104            Patient Lines/Drains/Airways Status    Active LINES/DRAINS/AIRWAYS     Name: Placement date: Placement time: Site: Days: Last dressing change:    Peripheral IV 07/05/17 Right Lower forearm 07/05/17   1146   Lower forearm   262     Incision/Surgical Site 03/20/18 Right Hip 03/20/18   1742    3             Patient Lines/Drains/Airways Status    Active PICC/CVC     None            Intake/Output Detail Report     Date Intake     Output   Net    Shift P.O. I.V. IV Piggyback Total Urine Blood Total       Noc 03/22/18 2300 - 03/23/18 0659 50 -- -- 50 -- -- -- 50    Day 03/23/18 0700 - 03/23/18 1459 -- -- -- -- -- -- -- 0    Nikki 03/23/18 1500 - 03/23/18 2259 25 -- -- 25 -- -- -- 25    Noc 03/23/18 2300 - 03/24/18 0659 -- -- -- -- --  -- -- 0    Day 03/24/18 0700 - 03/24/18 1459 50 -- -- 50 -- -- -- 50      Last Void/BM       Most Recent Value    Urine Occurrence 1 at 03/24/2018 0556    Stool Occurrence 1 at 03/24/2018 0556      Case Management/Discharge Planning     Case Management/Discharge Planning Flowsheet     REFERRAL INFORMATION     Major Change/Loss/Stressor  none 03/20/18 0048    Did the Initial Social Work Assessment result in a Social Work Case?  Yes 03/22/18 1514   EXPECTED DISCHARGE      Admission Type  inpatient 03/22/18 1514   Expected Discharge Date  03/23/18 (to 3/24 TCU) 03/23/18 0828    Arrived From  home or self-care 03/22/18 1514   DISCHARGE PLANNING      Referral Source  physician 03/22/18 1514   Transportation Available  car;family or friend will provide 03/21/18 1024    Reason For Consult  discharge planning 03/22/18 1514   FINAL RESOURCES      Record Reviewed  medical record 03/22/18 1514   Equipment Currently Used at Home  none 03/21/18 1024    CTS Assigned to Case  LESLYE Lester 03/23/18 1558   PAS Number  [id number] 03/23/18 1558    LIVING ENVIRONMENT     Senior Linkage Line Referral Placed  03/23/18 03/23/18 1558    Lives With  spouse 03/22/18 1514   ABUSE RISK SCREEN      Living Arrangements  [place] 03/22/18 1514   QUESTION TO PATIENT:  Has a member of your family or a partner(now or in the past) intimidated, hurt, manipulated, or controlled you in any way?  no 03/19/18 1935    Primary Care Provided By  spouse/significant other;homecare agency (specify) 03/22/18 1514   QUESTION TO PATIENT: Do you feel safe going back to the place where you are living?  yes 03/19/18 1935    Quality Of Family Relationships  supportive;helpful;involved 03/22/18 1514   OBSERVATION: Is there reason to believe there has been maltreatment of a vulnerable adult (ie. Physical/Sexual/Emotional abuse, self neglect, lack of adequate food, shelter, medical care, or financial exploitation)?  no 03/19/18 1935    Able to Return to Prior Living  Arrangements  no 03/22/18 1514   OTHER      ASSESSMENT OF FAMILY/SOCIAL SUPPORT     Are you depressed or being treated for depression?  No 03/20/18 0048    Marital Status   03/22/18 1514   HOMICIDE RISK      Who is your support system?  ;PCA 03/22/18 1514   Feels Like Hurting Others  no 03/19/18 1935    COPING/STRESS

## 2018-03-19 NOTE — IP AVS SNAPSHOT
MRN:[id number]                      After Visit Summary   3/19/2018    Adriane Flores    MRN: [id number]           Thank you!     Thank you for choosing [facility] for your care. Our goal is always to provide you with excellent care. Hearing back from our patients is one way we can continue to improve our services. Please take a few minutes to complete the written survey that you may receive in the mail after you visit with us. Thank you!        Patient Information     Date Of Birth          1940        Designated Caregiver       Most Recent Value    Caregiver    Will someone help with your care after discharge? yes    Name of designated caregiver Marcello    Phone number of caregiver [phone]    Caregiver address [address] [place] [address]      About your hospital stay     You were admitted on:  March 19, 2018 You last received care in the:  [place] [address] [facility]    You were discharged on:  March 24, 2018        Reason for your hospital stay       Right hip hemiarthroplasty secondary to femoral neck fracture                  Who to Call     For medical emergencies, please call 911.  For non-urgent questions about your medical care, please call your primary care provider or clinic, [phone]  For questions related to your surgery, please call your surgery clinic        Attending Provider     Provider Specialty    Diamond Mckeon, CNP Nurse Practitioner - Family    Margo Fernández MD Internal Medicine    Cayetano Ellington MD Orthopedics       Primary Care Provider Office Phone # Fax #    Saul Wheeler [phone]      After Care Instructions     Activity - Up with assistive device           Additional Discharge Instructions       Taking meds crushed in applesauce or pudding.            Advance Diet as Tolerated       Follow this diet upon discharge: Orders Placed This Encounter      Advance Diet as Tolerated: Regular Diet Adult             "Encourage PO fluids           Fall precautions           General info for SNF       Length of Stay Estimate: Short Term Care: Estimated # of Days <30  Condition at Discharge: Improving  Level of care:skilled   Rehabilitation Potential: Good  Admission H&P remains valid and up-to-date: Yes  Recent Chemotherapy: N/A  Use Nursing Home Standing Orders: Yes            Mantoux instructions       Give two-step Mantoux (PPD) Per Facility Policy Yes            Weight bearing status       WBAT            Wound care (specify)       Site:   Right hip  Instructions:  Change daily and as needed                  Follow-up Appointments     Follow Up and recommended labs and tests       Follow up with Dr. Ellington in 2 weeks.  No follow up labs or test are needed.  Call [phone]                  Additional Services     Occupational Therapy Adult Consult       Evaluate and treat as clinically indicated.    Reason:  Right hip hemiarthroplasty, no hip precautions            Physical Therapy Adult Consult       Evaluate and treat as clinically indicated.    Reason:  Right hip hemiarthroplasty, no hip precautions                  Pending Results     No orders found from 3/17/2018 to 3/20/2018.            Statement of Approval     Ordered          03/24/18 1034  I have reviewed and agree with all the recommendations and orders detailed in this document.  EFFECTIVE NOW     Approved and electronically signed by:  Miguelina Helton DO             Admission Information     Date & Time Provider Department Dept. Phone    3/19/2018 Cayetano Ellington MD [place] [address] [facility] [phone]      Your Vitals Were     Blood Pressure Pulse Temperature Respirations Height Weight    121/61 (BP Location: Left arm) 65 97.7  F (36.5  C) (Axillary) 16 1.575 m (5' 2\") 50.3 kg (111 lb)    Pulse Oximetry BMI (Body Mass Index)                96% 20.3 kg/m2          [employer] Information     [employer] lets you send messages to your " "doctor, view your test results, renew your prescriptions, schedule appointments and more. To sign up, go to [email or web address] . Click on \"Log in\" on the left side of the screen, which will take you to the Welcome page. Then click on \"Sign up Now\" on the right side of the page.     You will be asked to enter the access code listed below, as well as some personal information. Please follow the directions to create your username and password.     Your access code is: [email or web address]  Expires: 2018  6:54 PM     Your access code will  in 90 days. If you need help or a new code, please call your [facility] clinic or [phone].        Care EveryWhere ID     This is your Care EveryWhere ID. This could be used by other organizations to access your [facility] medical records  [id number]        Equal Access to Services     [facility] : Robyn Santacruz, frank ye, nilesh malave, marco gramajo . So [facility] [phone].    ATENCIÓN: Si habla español, tiene a chris disposición servicios gratuitos de asistencia lingüística. Alie al [phone].    We comply with applicable federal civil rights laws and [place] laws. We do not discriminate on the basis of race, color, national origin, age, disability, sex, sexual orientation, or gender identity.               Review of your medicines      START taking        Dose / Directions    acetaminophen 325 MG tablet   Commonly known as:  TYLENOL   Used for:  Closed fracture of neck of right femur, initial encounter (H)        Dose:  650 mg   Take 2 tablets (650 mg) by mouth every 8 hours   Quantity:  60 tablet   Refills:  0       buPROPion 75 MG tablet   Commonly known as:  WELLBUTRIN   Used for:  Alzheimer's dementia without behavioral disturbance, unspecified timing of dementia onset   Replaces:  buPROPion 150 MG 24 hr tablet        Dose:  75 mg   Take 1 tablet (75 mg) by mouth 2 times daily   Quantity:  90 tablet "   Refills:  0       QUEtiapine 25 MG tablet   Commonly known as:  SEROquel   Used for:  Alzheimer's dementia without behavioral disturbance, unspecified timing of dementia onset        Dose:  12.5 mg   Take 0.5 tablets (12.5 mg) by mouth 2 times daily as needed (give for severe agitation/at danger to self/others during the day, or for persistent insomina, mild agitation at night.)   Quantity:  60 tablet   Refills:  0       rivaroxaban ANTICOAGULANT 10 MG Tabs tablet   Commonly known as:  XARELTO   Used for:  Closed fracture of neck of right femur, initial encounter (H)        Dose:  10 mg   Take 1 tablet (10 mg) by mouth daily (with dinner)   Quantity:  21 tablet   Refills:  0         CONTINUE these medicines which may have CHANGED, or have new prescriptions. If we are uncertain of the size of tablets/capsules you have at home, strength may be listed as something that might have changed.        Dose / Directions    LORazepam 0.5 MG tablet   Commonly known as:  ATIVAN   Indication:  Feeling Anxious   This may have changed:  medication strength   Used for:  Closed fracture of neck of right femur, initial encounter (H)        Dose:  0.5 mg   Take 1 tablet (0.5 mg) by mouth every 8 hours as needed for anxiety   Quantity:  30 tablet   Refills:  0         CONTINUE these medicines which have NOT CHANGED        Dose / Directions    donepezil 5 MG tablet   Commonly known as:  ARICEPT   Used for:  Need for prophylactic vaccination and inoculation against influenza        Dose:  5 mg   Take 1 tablet (5 mg) by mouth At Bedtime   Quantity:  90 tablet   Refills:  3       memantine XR 28 MG 24 hr capsule   Commonly known as:  NAMENDA XR   Used for:  Alzheimer's dementia without behavioral disturbance, unspecified timing of dementia onset        Dose:  28 mg   Take 1 capsule (28 mg) by mouth daily   Refills:  0         STOP taking     buPROPion 150 MG 24 hr tablet   Commonly known as:  WELLBUTRIN XL   Replaced by:  buPROPion 75 MG  tablet                Where to get your medicines      Some of these will need a paper prescription and others can be bought over the counter. Ask your nurse if you have questions.     Bring a paper prescription for each of these medications     acetaminophen 325 MG tablet    LORazepam 0.5 MG tablet    rivaroxaban ANTICOAGULANT 10 MG Tabs tablet       You don't need a prescription for these medications     buPROPion 75 MG tablet    memantine XR 28 MG 24 hr capsule    QUEtiapine 25 MG tablet                Protect others around you: Learn how to safely use, store and throw away your medicines at www.disposemymeds.org.             Medication List: This is a list of all your medications and when to take them. Check marks below indicate your daily home schedule. Keep this list as a reference.      Medications           Morning Afternoon Evening Bedtime As Needed    acetaminophen 325 MG tablet   Commonly known as:  TYLENOL   Take 2 tablets (650 mg) by mouth every 8 hours   Last time this was given:  325 mg on 3/23/2018  6:39 PM                                buPROPion 75 MG tablet   Commonly known as:  WELLBUTRIN   Take 1 tablet (75 mg) by mouth 2 times daily   Last time this was given:  75 mg on 3/24/2018  1:37 PM                                donepezil 5 MG tablet   Commonly known as:  ARICEPT   Take 1 tablet (5 mg) by mouth At Bedtime   Last time this was given:  5 mg on 3/23/2018  9:36 PM                                LORazepam 0.5 MG tablet   Commonly known as:  ATIVAN   Take 1 tablet (0.5 mg) by mouth every 8 hours as needed for anxiety                                memantine XR 28 MG 24 hr capsule   Commonly known as:  NAMENDA XR   Take 1 capsule (28 mg) by mouth daily   Last time this was given:  28 mg on 3/24/2018 10:52 AM                                QUEtiapine 25 MG tablet   Commonly known as:  SEROquel   Take 0.5 tablets (12.5 mg) by mouth 2 times daily as needed (give for severe agitation/at danger to  self/others during the day, or for persistent insomina, mild agitation at night.)   Last time this was given:  12.5 mg on 3/22/2018 11:56 AM                                rivaroxaban ANTICOAGULANT 10 MG Tabs tablet   Commonly known as:  XARELTO   Take 1 tablet (10 mg) by mouth daily (with dinner)

## 2018-03-19 NOTE — IP AVS SNAPSHOT
"          [place] [address] [facility]: [phone]                                              INTERAGENCY TRANSFER FORM - LAB / IMAGING / EKG / EMG RESULTS   3/19/2018                    Hospital Admission Date: 3/19/2018  MARGO MCLEAN   : 1940  Sex: Female        Attending Provider: Cayetano Ellington MD     Allergies:  Sulfa Drugs    Infection:  None   Service:  HOSPITALIST    Ht:  1.575 m (5' 2\")   Wt:  50.3 kg (111 lb)   Admission Wt:  49.9 kg (110 lb)    BMI:  20.3 kg/m 2   BSA:  1.48 m 2            Patient PCP Information     Provider PCP Type    Saul Wheeler MD General         Lab Results - 3 Days      Basic metabolic panel [[id number]] (Abnormal)  Resulted: 18 0656, Result status: Final result    Ordering provider: Ariadne Rodas MD  18 0000 Resulting lab: [facility]    Specimen Information    Type Source Collected On   Blood  1830          Components       Value Reference Range Flag Lab   Sodium 141 133 - 144 mmol/L  FrStHsLb   Potassium 3.8 3.4 - 5.3 mmol/L  FrStHsLb   Chloride 109 94 - 109 mmol/L  FrStHsLb   Carbon Dioxide 25 20 - 32 mmol/L  FrStHsLb   Anion Gap 7 3 - 14 mmol/L  FrStHsLb   Glucose 99 70 - 99 mg/dL  FrStHsLb   Urea Nitrogen 12 7 - 30 mg/dL  FrStHsLb   Creatinine 0.72 0.52 - 1.04 mg/dL  FrStHsLb   GFR Estimate 79 >60 mL/min/1.7m2  FrStHsLb   Comment:  Non  GFR Calc   GFR Estimate If Black >90 >60 mL/min/1.7m2  FrStHsLb   Comment:  African American GFR Calc   Calcium 8.0 8.5 - 10.1 mg/dL L FrStHsLb            Hemoglobin [[id number]] (Abnormal)  Resulted: 18 0641, Result status: Final result    Ordering provider: Cayetano Ellington MD  18 0000 Resulting lab: [facility]    Specimen Information    Type Source Collected On   Blood  1830          Components       Value Reference Range Flag Lab   Hemoglobin 10.9 11.7 - 15.7 g/dL L FrStHsLb            Basic metabolic " panel [[id number]] (Abnormal)  Resulted: 03/21/18 0956, Result status: Final result    Ordering provider: Cayetano Ellington MD  03/21/18 0701 Resulting lab: [facility]    Specimen Information    Type Source Collected On     03/21/18 0701          Components       Value Reference Range Flag Lab   Sodium 138 133 - 144 mmol/L  FrStHsLb   Potassium 4.2 3.4 - 5.3 mmol/L  FrStHsLb   Chloride 106 94 - 109 mmol/L  FrStHsLb   Carbon Dioxide 26 20 - 32 mmol/L  FrStHsLb   Anion Gap 6 3 - 14 mmol/L  FrStHsLb   Glucose 92 70 - 99 mg/dL  FrStHsLb   Urea Nitrogen 16 7 - 30 mg/dL  FrStHsLb   Creatinine 0.74 0.52 - 1.04 mg/dL  FrStHsLb   GFR Estimate 76 >60 mL/min/1.7m2  FrStHsLb   Comment:  Non  GFR Calc   GFR Estimate If Black >90 >60 mL/min/1.7m2  FrStHsLb   Comment:  African American GFR Calc   Calcium 7.9 8.5 - 10.1 mg/dL L FrStHsLb            Hemoglobin [[id number]] (Abnormal)  Resulted: 03/21/18 0725, Result status: Final result    Ordering provider: Cayetano Ellington MD  03/21/18 0001 Resulting lab: [facility]    Specimen Information    Type Source Collected On   Blood  03/21/18 0701          Components       Value Reference Range Flag Lab   Hemoglobin 11.6 11.7 - 15.7 g/dL L FrStHsLb            Creatinine [[id number]]  Resulted: 03/20/18 2152, Result status: Final result    Ordering provider: Cayetano Ellington MD  03/20/18 1937 Resulting lab: [facility]    Specimen Information    Type Source Collected On   Blood  03/20/18 2110          Components       Value Reference Range Flag Lab   Creatinine 0.74 0.52 - 1.04 mg/dL  FrStHsLb   GFR Estimate 76 >60 mL/min/1.7m2  FrStHsLb   Comment:  Non  GFR Calc   GFR Estimate If Black >90 >60 mL/min/1.7m2  FrStHsLb   Comment:   GFR Calc            Platelet count [[id number]]  Resulted: 03/20/18 2134, Result status: Final result    Ordering provider: Cayetano Ellington MD  03/20/18 1937  Resulting lab: [facility]    Specimen Information    Type Source Collected On   Blood  03/20/18 2110          Components       Value Reference Range Flag Lab   Platelet Count 237 150 - 450 10e9/L  FrStHsLb            Basic metabolic panel [[id number]] (Abnormal)  Resulted: 03/19/18 2223, Result status: Final result    Ordering provider: Diamond Mckeon CNP  03/19/18 2138 Resulting lab: [facility]    Specimen Information    Type Source Collected On   Blood  03/19/18 2156          Components       Value Reference Range Flag Lab   Sodium 135 133 - 144 mmol/L  FrStHsLb   Potassium 4.4 3.4 - 5.3 mmol/L  FrStHsLb   Comment:  Specimen slightly hemolyzed, potassium may be falsely elevated   Chloride 100 94 - 109 mmol/L  FrStHsLb   Carbon Dioxide 26 20 - 32 mmol/L  FrStHsLb   Anion Gap 9 3 - 14 mmol/L  FrStHsLb   Glucose 102 70 - 99 mg/dL H FrStHsLb   Urea Nitrogen 31 7 - 30 mg/dL H FrStHsLb   Creatinine 0.73 0.52 - 1.04 mg/dL  FrStHsLb   GFR Estimate 77 >60 mL/min/1.7m2  FrStHsLb   Comment:  Non  GFR Calc   GFR Estimate If Black >90 >60 mL/min/1.7m2  FrStHsLb   Comment:  African American GFR Calc   Calcium 8.1 8.5 - 10.1 mg/dL L FrStHsLb            INR [[id number]]  Resulted: 03/19/18 2220, Result status: Final result    Ordering provider: Diamond Mckeon CNP  03/19/18 2138 Resulting lab: [facility]    Specimen Information    Type Source Collected On   Blood  03/19/18 2156          Components       Value Reference Range Flag Lab   INR 0.92 0.86 - 1.14  FrStHsLb            Partial thromboplastin time [[id number]]  Resulted: 03/19/18 2220, Result status: Final result    Ordering provider: Diamond Mckeon CNP  03/19/18 2138 Resulting lab: [facility]    Specimen Information    Type Source Collected On   Blood  03/19/18 2156          Components       Value Reference Range Flag Lab   PTT 28 22 - 37 sec  FrStHsLb            CBC with platelets  differential [[id number]]  Resulted: 03/19/18 2208, Result status: Final result    Ordering provider: Diamond Mckeon CNP  03/19/18 2138 Resulting lab: [facility]    Specimen Information    Type Source Collected On   Blood  03/19/18 2156          Components       Value Reference Range Flag Lab   WBC 9.8 4.0 - 11.0 10e9/L  FrStHsLb   RBC Count 4.10 3.8 - 5.2 10e12/L  FrStHsLb   Hemoglobin 12.5 11.7 - 15.7 g/dL  FrStHsLb   Hematocrit 37.7 35.0 - 47.0 %  FrStHsLb   MCV 92 78 - 100 fl  FrStHsLb   MCH 30.5 26.5 - 33.0 pg  FrStHsLb   MCHC 33.2 31.5 - 36.5 g/dL  FrStHsLb   RDW 12.5 10.0 - 15.0 %  FrStHsLb   Platelet Count 250 150 - 450 10e9/L  FrStHsLb   Diff Method Automated Method   FrStHsLb   % Neutrophils 64.0 %  FrStHsLb   % Lymphocytes 20.8 %  FrStHsLb   % Monocytes 10.2 %  FrStHsLb   % Eosinophils 4.5 %  FrStHsLb   % Basophils 0.3 %  FrStHsLb   % Immature Granulocytes 0.2 %  FrStHsLb   Nucleated RBCs 0 0 /100  FrStHsLb   Absolute Neutrophil 6.3 1.6 - 8.3 10e9/L  FrStHsLb   Absolute Lymphocytes 2.0 0.8 - 5.3 10e9/L  FrStHsLb   Absolute Monocytes 1.0 0.0 - 1.3 10e9/L  FrStHsLb   Absolute Eosinophils 0.4 0.0 - 0.7 10e9/L  FrStHsLb   Absolute Basophils 0.0 0.0 - 0.2 10e9/L  FrStHsLb   Abs Immature Granulocytes 0.0 0 - 0.4 10e9/L  FrStHsLb   Absolute Nucleated RBC 0.0   FrStHsLb            Testing Performed By     Lab - Abbreviation Name Director Address Valid Date Range    14 - FrStHsLb [facility] Unknown [address] [place] [address] 05/08/15 1057 - Present            Unresulted Labs (24h ago through future)    Start       Ordered    03/24/18 0600  Creatinine  EVERY THREE DAYS,   Routine      03/21/18 1031    03/23/18 0600  Platelet count  (enoxaparin (LOVENOX) (Weight  kg with CrCl greater than 30 mL/min is prechecked))  EVERY THREE DAYS,   Routine     Comments:  Repeat every 3 days while on VTE prophylaxis. If no result is listed, this lab has not been done the past 365  days. LATEST LAB RESULT: Platelet Count (10e9/L)       Date                     Value                 03/19/2018               250              ----------      03/20/18 1937    Unscheduled  Hemoglobin  CONDITIONAL X 2,   Routine     Comments:  IF morning Hemoglobin result is LESS than 8.0 on POD 1 and/or POD 2, release order and recheck Hemoglobin in the evening at 1700.  Notify Provider if second Hemoglobin result is LESS than 7.5.    03/20/18 1937         Imaging Results - 3 Days      XR Pelvis w Hip Port Right 1 View [[id number]]  Resulted: 03/20/18 1829, Result status: Final result    Ordering provider: Cayetano Ellington MD  03/20/18 1742 Resulted by: Jeyson Morillo MD    Performed: 03/20/18 1755 - 03/20/18 1819 Resulting lab: RADIOLOGY RESULTS    Narrative:       PORTABLE PELVIS AND RIGHT HIP ONE VIEW   3/20/2018 6:19 PM     HISTORY: Postoperative hip arthroplasty.    COMPARISON: 3/19/2018      Impression:       IMPRESSION: Interval right hip arthroplasty. The femoral prosthesis  appears located on the AP projection. The lateral projection is  suboptimal as the acetabulum was not definitely visualized.     JEYSON MORILLO MD      XR Chest 1 View [[id number]]  Resulted: 03/19/18 2224, Result status: Final result    Ordering provider: Diamond Mckeon CNP  03/19/18 2141 Resulted by: Ulices Wells MD    Performed: 03/19/18 2201 - 03/19/18 2214 Resulting lab: RADIOLOGY RESULTS    Narrative:       CHEST ONE VIEW   3/19/2018  10:14 PM     HISTORY: Pain.    COMPARISON: Chest CT 12/17/2007.      Impression:       IMPRESSION: Pulmonary vasculature is indistinct suggestive of vascular  congestion. Cardiac silhouette within normal limits. No definite focal  airspace opacity. No pleural effusion or pneumothorax identified on  supine film. The bones are unremarkable.    ULICES WELLS MD      Knee XR, 3 views, left [[id number]]  Resulted: 03/19/18 2159, Result status: Final result    Ordering provider: Diamond Mckeon CNP   03/19/18 2045 Resulted by: James Benton MD    Performed: 03/19/18 2122 - 03/19/18 2123 Resulting lab: RADIOLOGY RESULTS    Narrative:       LEFT KNEE THREE VIEWS     3/19/2018 9:23 PM     HISTORY: Fall with left knee pain and bruising.    COMPARISON: None.      Impression:       IMPRESSION: No acute fracture. Anatomic alignment. Small osteophytes  noted.       JAMES BENTON MD      XR Pelvis w Hip Right 1 View [[id number]]  Resulted: 03/19/18 2159, Result status: Final result    Ordering provider: Diamond Mckeon CNP  03/19/18 2045 Resulted by: James Benton MD    Performed: 03/19/18 2122 - 03/19/18 2122 Resulting lab: RADIOLOGY RESULTS    Narrative:       PELVIS AND HIP RIGHT ONE VIEW   3/19/2018 9:22 PM     HISTORY: Pain.    COMPARISON: 2/23/2016.      Impression:       IMPRESSION: Acute impacted fracture through the right femoral neck.  Right femoral head remains located at the hip joint. Left hip shows no  acute abnormality.        JAMES BENTON MD      Testing Performed By     Lab - Abbreviation Name Director Address Valid Date Range    104 - Rad Rslts RADIOLOGY RESULTS Unknown Unknown 02/16/05 1553 - Present            Encounter-Level Documents:     There are no encounter-level documents.      Order-Level Documents:     There are no order-level documents.

## 2018-03-19 NOTE — IP AVS SNAPSHOT
"[place] [address] [facility]: [phone]                                              INTERAGENCY TRANSFER FORM - PHYSICIAN ORDERS   3/19/2018                    Hospital Admission Date: 3/19/2018  MARGO MCLEAN   : 1940  Sex: Female        Attending Provider: Cayetano Ellington MD     Allergies:  Sulfa Drugs    Infection:  None   Service:  HOSPITALIST    Ht:  1.575 m (5' 2\")   Wt:  50.3 kg (111 lb)   Admission Wt:  49.9 kg (110 lb)    BMI:  20.3 kg/m 2   BSA:  1.48 m 2            Patient PCP Information     Provider PCP Type    Saul Wheeler MD General      ED Clinical Impression     Diagnosis Description Comment Added By Time Added    Closed fracture of neck of right femur, initial encounter (H) [S72.001A] Closed fracture of neck of right femur, initial encounter (H) [S72.001A] Acute impacted fracture through the right femoral neck. Right femoral head remains located at the hip joint Diamond Mckeon, CNP 3/19/2018 11:09 PM      Hospital Problems as of 3/24/2018              Priority Class Noted POA    Hip fracture, right, closed, initial encounter (H) Medium  3/20/2018 Yes    Hip fracture requiring operative repair (H) Medium  3/20/2018 Yes      Non-Hospital Problems as of 3/24/2018              Priority Class Noted    Generalized osteoarthrosis, unspecified site Medium  2003    Disorder of bone and cartilage Medium  2003    Flatulence, eructation, and gas pain Low  2003    Pure hypercholesterolemia Medium  2007    Hyperlipidemia LDL goal <100 Medium  10/31/2010    Advanced directives, counseling/discussion Low  1/3/2012    Memory disorder High  2012    Alzheimer's disease High  2013      Code Status History     Date Active Date Inactive Code Status Order ID Comments User Context    3/24/2018 10:51 AM  Full Code [id number]  Miguelina Helton DO Outpatient    3/20/2018  7:37 PM 3/24/2018 10:51 AM Full Code [id number]  Cayetano Ellington MD " Inpatient    3/20/2018 12:56 AM 3/20/2018  7:37 PM Full Code [id number]  Margo Fernández MD Inpatient         Medication Review      START taking        Dose / Directions Comments    acetaminophen 325 MG tablet   Commonly known as:  TYLENOL   Used for:  Closed fracture of neck of right femur, initial encounter (H)        Dose:  650 mg   Take 2 tablets (650 mg) by mouth every 8 hours   Quantity:  60 tablet   Refills:  0        buPROPion 75 MG tablet   Commonly known as:  WELLBUTRIN   Used for:  Alzheimer's dementia without behavioral disturbance, unspecified timing of dementia onset   Replaces:  buPROPion 150 MG 24 hr tablet        Dose:  75 mg   Take 1 tablet (75 mg) by mouth 2 times daily   Quantity:  90 tablet   Refills:  0        QUEtiapine 25 MG tablet   Commonly known as:  SEROquel   Used for:  Alzheimer's dementia without behavioral disturbance, unspecified timing of dementia onset        Dose:  12.5 mg   Take 0.5 tablets (12.5 mg) by mouth 2 times daily as needed (give for severe agitation/at danger to self/others during the day, or for persistent insomina, mild agitation at night.)   Quantity:  60 tablet   Refills:  0        rivaroxaban ANTICOAGULANT 10 MG Tabs tablet   Commonly known as:  XARELTO   Used for:  Closed fracture of neck of right femur, initial encounter (H)        Dose:  10 mg   Take 1 tablet (10 mg) by mouth daily (with dinner)   Quantity:  21 tablet   Refills:  0          CONTINUE these medications which may have CHANGED, or have new prescriptions. If we are uncertain of the size of tablets/capsules you have at home, strength may be listed as something that might have changed.        Dose / Directions Comments    LORazepam 0.5 MG tablet   Commonly known as:  ATIVAN   Indication:  Feeling Anxious   This may have changed:  medication strength   Used for:  Closed fracture of neck of right femur, initial encounter (H)        Dose:  0.5 mg   Take 1 tablet (0.5 mg) by mouth every 8 hours as needed  for anxiety   Quantity:  30 tablet   Refills:  0          CONTINUE these medications which have NOT CHANGED        Dose / Directions Comments    donepezil 5 MG tablet   Commonly known as:  ARICEPT   Used for:  Need for prophylactic vaccination and inoculation against influenza        Dose:  5 mg   Take 1 tablet (5 mg) by mouth At Bedtime   Quantity:  90 tablet   Refills:  3        memantine XR 28 MG 24 hr capsule   Commonly known as:  NAMENDA XR   Used for:  Alzheimer's dementia without behavioral disturbance, unspecified timing of dementia onset        Dose:  28 mg   Take 1 capsule (28 mg) by mouth daily   Refills:  0    May open the capsule and sprinkle on applesauce         STOP taking     buPROPion 150 MG 24 hr tablet   Commonly known as:  WELLBUTRIN XL   Replaced by:  buPROPion 75 MG tablet                   Summary of Visit     Reason for your hospital stay       Right hip hemiarthroplasty secondary to femoral neck fracture             After Care     Activity - Up with assistive device           Additional Discharge Instructions       Taking meds crushed in applesauce or pudding.       Advance Diet as Tolerated       Follow this diet upon discharge: Orders Placed This Encounter      Advance Diet as Tolerated: Regular Diet Adult       Encourage PO fluids           Fall precautions           General info for SNF       Length of Stay Estimate: Short Term Care: Estimated # of Days <30  Condition at Discharge: Improving  Level of care:skilled   Rehabilitation Potential: Good  Admission H&P remains valid and up-to-date: Yes  Recent Chemotherapy: N/A  Use Nursing Home Standing Orders: Yes       Mantoux instructions       Give two-step Mantoux (PPD) Per Facility Policy Yes       Weight bearing status       WBAT       Wound care (specify)       Site:   Right hip  Instructions:  Change daily and as needed             Referrals     Occupational Therapy Adult Consult       Evaluate and treat as clinically  indicated.    Reason:  Right hip hemiarthroplasty, no hip precautions       Physical Therapy Adult Consult       Evaluate and treat as clinically indicated.    Reason:  Right hip hemiarthroplasty, no hip precautions             Follow-Up Appointment Instructions     Future Labs/Procedures    Follow Up and recommended labs and tests     Comments:    Follow up with Dr. Ellington in 2 weeks.  No follow up labs or test are needed.  Call [phone]      Follow-Up Appointment Instructions     Follow Up and recommended labs and tests       Follow up with Dr. Ellington in 2 weeks.  No follow up labs or test are needed.  Call [phone]             Statement of Approval     Ordered          03/24/18 1034  I have reviewed and agree with all the recommendations and orders detailed in this document.  EFFECTIVE NOW     Approved and electronically signed by:  Miguelina Helton DO

## 2018-03-20 ENCOUNTER — ANESTHESIA (OUTPATIENT)
Dept: SURGERY | Facility: CLINIC | Age: 78
DRG: 470 | End: 2018-03-20
Payer: COMMERCIAL

## 2018-03-20 ENCOUNTER — APPOINTMENT (OUTPATIENT)
Dept: GENERAL RADIOLOGY | Facility: CLINIC | Age: 78
DRG: 470 | End: 2018-03-20
Attending: ORTHOPAEDIC SURGERY
Payer: COMMERCIAL

## 2018-03-20 ENCOUNTER — ANESTHESIA EVENT (OUTPATIENT)
Dept: SURGERY | Facility: CLINIC | Age: 78
DRG: 470 | End: 2018-03-20
Payer: COMMERCIAL

## 2018-03-20 PROBLEM — S72.001A HIP FRACTURE, RIGHT, CLOSED, INITIAL ENCOUNTER (H): Status: ACTIVE | Noted: 2018-03-20

## 2018-03-20 PROBLEM — S72.009A HIP FRACTURE REQUIRING OPERATIVE REPAIR (H): Status: ACTIVE | Noted: 2018-03-20

## 2018-03-20 LAB
CREAT SERPL-MCNC: 0.74 MG/DL (ref 0.52–1.04)
GFR SERPL CREATININE-BSD FRML MDRD: 76 ML/MIN/1.7M2
INTERPRETATION ECG - MUSE: NORMAL
PLATELET # BLD AUTO: 237 10E9/L (ref 150–450)

## 2018-03-20 PROCEDURE — 36000063 ZZH SURGERY LEVEL 4 EA 15 ADDTL MIN: Performed by: ORTHOPAEDIC SURGERY

## 2018-03-20 PROCEDURE — 25000125 ZZHC RX 250: Performed by: PHYSICIAN ASSISTANT

## 2018-03-20 PROCEDURE — 71000013 ZZH RECOVERY PHASE 1 LEVEL 1 EA ADDTL HR: Performed by: ORTHOPAEDIC SURGERY

## 2018-03-20 PROCEDURE — 40000169 ZZH STATISTIC PRE-PROCEDURE ASSESSMENT I: Performed by: ORTHOPAEDIC SURGERY

## 2018-03-20 PROCEDURE — 85049 AUTOMATED PLATELET COUNT: CPT | Performed by: ORTHOPAEDIC SURGERY

## 2018-03-20 PROCEDURE — 40000916 ZZH STATISTIC SITTER, NIGHT HOURS

## 2018-03-20 PROCEDURE — 36000093 ZZH SURGERY LEVEL 4 1ST 30 MIN: Performed by: ORTHOPAEDIC SURGERY

## 2018-03-20 PROCEDURE — 25000128 H RX IP 250 OP 636: Performed by: PHYSICIAN ASSISTANT

## 2018-03-20 PROCEDURE — 25000125 ZZHC RX 250: Performed by: REGISTERED NURSE

## 2018-03-20 PROCEDURE — 25000128 H RX IP 250 OP 636: Performed by: ORTHOPAEDIC SURGERY

## 2018-03-20 PROCEDURE — 40000986 XR PELVIS AD HIP PORTABLE RIGHT 1 VIEW

## 2018-03-20 PROCEDURE — 25000566 ZZH SEVOFLURANE, EA 15 MIN: Performed by: ORTHOPAEDIC SURGERY

## 2018-03-20 PROCEDURE — 25000128 H RX IP 250 OP 636: Performed by: NURSE ANESTHETIST, CERTIFIED REGISTERED

## 2018-03-20 PROCEDURE — 82565 ASSAY OF CREATININE: CPT | Performed by: ORTHOPAEDIC SURGERY

## 2018-03-20 PROCEDURE — 25000128 H RX IP 250 OP 636: Performed by: HOSPITALIST

## 2018-03-20 PROCEDURE — C1776 JOINT DEVICE (IMPLANTABLE): HCPCS | Performed by: ORTHOPAEDIC SURGERY

## 2018-03-20 PROCEDURE — 25000128 H RX IP 250 OP 636: Performed by: REGISTERED NURSE

## 2018-03-20 PROCEDURE — 71000012 ZZH RECOVERY PHASE 1 LEVEL 1 FIRST HR: Performed by: ORTHOPAEDIC SURGERY

## 2018-03-20 PROCEDURE — C9399 UNCLASSIFIED DRUGS OR BIOLOG: HCPCS | Performed by: NURSE ANESTHETIST, CERTIFIED REGISTERED

## 2018-03-20 PROCEDURE — 27210794 ZZH OR GENERAL SUPPLY STERILE: Performed by: ORTHOPAEDIC SURGERY

## 2018-03-20 PROCEDURE — 27210995 ZZH RX 272: Performed by: ORTHOPAEDIC SURGERY

## 2018-03-20 PROCEDURE — 99207 ZZC NO CHARGE VISIT/PATIENT NOT SEEN: CPT | Performed by: INTERNAL MEDICINE

## 2018-03-20 PROCEDURE — 0SRR0JA REPLACEMENT OF RIGHT HIP JOINT, FEMORAL SURFACE WITH SYNTHETIC SUBSTITUTE, UNCEMENTED, OPEN APPROACH: ICD-10-PCS | Performed by: ORTHOPAEDIC SURGERY

## 2018-03-20 PROCEDURE — 37000008 ZZH ANESTHESIA TECHNICAL FEE, 1ST 30 MIN: Performed by: ORTHOPAEDIC SURGERY

## 2018-03-20 PROCEDURE — 37000009 ZZH ANESTHESIA TECHNICAL FEE, EACH ADDTL 15 MIN: Performed by: ORTHOPAEDIC SURGERY

## 2018-03-20 PROCEDURE — 36415 COLL VENOUS BLD VENIPUNCTURE: CPT | Performed by: ORTHOPAEDIC SURGERY

## 2018-03-20 PROCEDURE — 25000125 ZZHC RX 250: Performed by: NURSE ANESTHETIST, CERTIFIED REGISTERED

## 2018-03-20 PROCEDURE — 12000007 ZZH R&B INTERMEDIATE

## 2018-03-20 PROCEDURE — 25000128 H RX IP 250 OP 636: Performed by: ANESTHESIOLOGY

## 2018-03-20 DEVICE — IMP CUP SNN BIPOLAR TANDEM 45MM: Type: IMPLANTABLE DEVICE | Site: HIP | Status: FUNCTIONAL

## 2018-03-20 DEVICE — IMPLANTABLE DEVICE: Type: IMPLANTABLE DEVICE | Site: HIP | Status: FUNCTIONAL

## 2018-03-20 DEVICE — IMP HEAD FEMORAL SNR COBALT 28MM +0 71302800: Type: IMPLANTABLE DEVICE | Site: HIP | Status: FUNCTIONAL

## 2018-03-20 RX ORDER — CEFAZOLIN SODIUM 1 G
1 VIAL (EA) INJECTION SEE ADMIN INSTRUCTIONS
Status: DISCONTINUED | OUTPATIENT
Start: 2018-03-20 | End: 2018-03-20 | Stop reason: HOSPADM

## 2018-03-20 RX ORDER — MEMANTINE HYDROCHLORIDE 28 MG/1
28 CAPSULE, EXTENDED RELEASE ORAL DAILY
Status: DISCONTINUED | OUTPATIENT
Start: 2018-03-21 | End: 2018-03-24 | Stop reason: HOSPADM

## 2018-03-20 RX ORDER — DONEPEZIL HYDROCHLORIDE 5 MG/1
5 TABLET, FILM COATED ORAL AT BEDTIME
Status: DISCONTINUED | OUTPATIENT
Start: 2018-03-20 | End: 2018-03-24 | Stop reason: HOSPADM

## 2018-03-20 RX ORDER — BUPROPION HYDROCHLORIDE 150 MG/1
150 TABLET ORAL DAILY
Status: DISCONTINUED | OUTPATIENT
Start: 2018-03-21 | End: 2018-03-24

## 2018-03-20 RX ORDER — LORAZEPAM 0.5 MG/1
0.5 TABLET ORAL EVERY 8 HOURS PRN
Status: DISCONTINUED | OUTPATIENT
Start: 2018-03-20 | End: 2018-03-21

## 2018-03-20 RX ORDER — FENTANYL CITRATE 0.05 MG/ML
25-50 INJECTION, SOLUTION INTRAMUSCULAR; INTRAVENOUS
Status: DISCONTINUED | OUTPATIENT
Start: 2018-03-20 | End: 2018-03-20 | Stop reason: HOSPADM

## 2018-03-20 RX ORDER — ACETAMINOPHEN 325 MG/1
325 TABLET ORAL EVERY 8 HOURS
Status: DISPENSED | OUTPATIENT
Start: 2018-03-21 | End: 2018-03-24

## 2018-03-20 RX ORDER — NALOXONE HYDROCHLORIDE 0.4 MG/ML
.1-.4 INJECTION, SOLUTION INTRAMUSCULAR; INTRAVENOUS; SUBCUTANEOUS
Status: DISCONTINUED | OUTPATIENT
Start: 2018-03-20 | End: 2018-03-20

## 2018-03-20 RX ORDER — ONDANSETRON 4 MG/1
4 TABLET, ORALLY DISINTEGRATING ORAL EVERY 30 MIN PRN
Status: DISCONTINUED | OUTPATIENT
Start: 2018-03-20 | End: 2018-03-20 | Stop reason: HOSPADM

## 2018-03-20 RX ORDER — BUPROPION HYDROCHLORIDE 150 MG/1
150 TABLET ORAL DAILY
Status: ON HOLD | COMMUNITY
End: 2018-03-24

## 2018-03-20 RX ORDER — SODIUM CHLORIDE 9 MG/ML
INJECTION, SOLUTION INTRAVENOUS CONTINUOUS
Status: DISCONTINUED | OUTPATIENT
Start: 2018-03-20 | End: 2018-03-22

## 2018-03-20 RX ORDER — POTASSIUM CHLORIDE 1.5 G/1.58G
20-40 POWDER, FOR SOLUTION ORAL
Status: DISCONTINUED | OUTPATIENT
Start: 2018-03-20 | End: 2018-03-20

## 2018-03-20 RX ORDER — ACETAMINOPHEN 10 MG/ML
1000 INJECTION, SOLUTION INTRAVENOUS ONCE
Status: COMPLETED | OUTPATIENT
Start: 2018-03-20 | End: 2018-03-20

## 2018-03-20 RX ORDER — NALOXONE HYDROCHLORIDE 0.4 MG/ML
.1-.4 INJECTION, SOLUTION INTRAMUSCULAR; INTRAVENOUS; SUBCUTANEOUS
Status: ACTIVE | OUTPATIENT
Start: 2018-03-20 | End: 2018-03-21

## 2018-03-20 RX ORDER — ONDANSETRON 2 MG/ML
INJECTION INTRAMUSCULAR; INTRAVENOUS PRN
Status: DISCONTINUED | OUTPATIENT
Start: 2018-03-20 | End: 2018-03-20

## 2018-03-20 RX ORDER — SODIUM CHLORIDE, SODIUM LACTATE, POTASSIUM CHLORIDE, CALCIUM CHLORIDE 600; 310; 30; 20 MG/100ML; MG/100ML; MG/100ML; MG/100ML
INJECTION, SOLUTION INTRAVENOUS CONTINUOUS
Status: DISCONTINUED | OUTPATIENT
Start: 2018-03-20 | End: 2018-03-20 | Stop reason: HOSPADM

## 2018-03-20 RX ORDER — MEMANTINE HYDROCHLORIDE 28 MG/1
28 CAPSULE, EXTENDED RELEASE ORAL DAILY
Status: ON HOLD | COMMUNITY
End: 2018-03-24

## 2018-03-20 RX ORDER — LIDOCAINE 40 MG/G
CREAM TOPICAL
Status: DISCONTINUED | OUTPATIENT
Start: 2018-03-20 | End: 2018-03-24 | Stop reason: HOSPADM

## 2018-03-20 RX ORDER — AMOXICILLIN 250 MG
1 CAPSULE ORAL 2 TIMES DAILY PRN
Status: DISCONTINUED | OUTPATIENT
Start: 2018-03-20 | End: 2018-03-20

## 2018-03-20 RX ORDER — HYDROMORPHONE HYDROCHLORIDE 1 MG/ML
.3-.5 INJECTION, SOLUTION INTRAMUSCULAR; INTRAVENOUS; SUBCUTANEOUS
Status: DISCONTINUED | OUTPATIENT
Start: 2018-03-20 | End: 2018-03-24 | Stop reason: HOSPADM

## 2018-03-20 RX ORDER — AMOXICILLIN 250 MG
2 CAPSULE ORAL 2 TIMES DAILY PRN
Status: DISCONTINUED | OUTPATIENT
Start: 2018-03-20 | End: 2018-03-20

## 2018-03-20 RX ORDER — POTASSIUM CHLORIDE 7.45 MG/ML
10 INJECTION INTRAVENOUS
Status: DISCONTINUED | OUTPATIENT
Start: 2018-03-20 | End: 2018-03-20

## 2018-03-20 RX ORDER — LIDOCAINE HYDROCHLORIDE 20 MG/ML
INJECTION, SOLUTION INFILTRATION; PERINEURAL PRN
Status: DISCONTINUED | OUTPATIENT
Start: 2018-03-20 | End: 2018-03-20

## 2018-03-20 RX ORDER — SODIUM CHLORIDE 9 MG/ML
INJECTION, SOLUTION INTRAVENOUS CONTINUOUS
Status: DISCONTINUED | OUTPATIENT
Start: 2018-03-20 | End: 2018-03-20

## 2018-03-20 RX ORDER — HYDROMORPHONE HYDROCHLORIDE 1 MG/ML
.3-.5 INJECTION, SOLUTION INTRAMUSCULAR; INTRAVENOUS; SUBCUTANEOUS EVERY 5 MIN PRN
Status: DISCONTINUED | OUTPATIENT
Start: 2018-03-20 | End: 2018-03-20 | Stop reason: HOSPADM

## 2018-03-20 RX ORDER — BISACODYL 10 MG
10 SUPPOSITORY, RECTAL RECTAL DAILY PRN
Status: DISCONTINUED | OUTPATIENT
Start: 2018-03-20 | End: 2018-03-20

## 2018-03-20 RX ORDER — POTASSIUM CHLORIDE 1500 MG/1
20-40 TABLET, EXTENDED RELEASE ORAL
Status: DISCONTINUED | OUTPATIENT
Start: 2018-03-20 | End: 2018-03-20

## 2018-03-20 RX ORDER — PROPOFOL 10 MG/ML
INJECTION, EMULSION INTRAVENOUS PRN
Status: DISCONTINUED | OUTPATIENT
Start: 2018-03-20 | End: 2018-03-20

## 2018-03-20 RX ORDER — POTASSIUM CL/LIDO/0.9 % NACL 10MEQ/0.1L
10 INTRAVENOUS SOLUTION, PIGGYBACK (ML) INTRAVENOUS
Status: DISCONTINUED | OUTPATIENT
Start: 2018-03-20 | End: 2018-03-20

## 2018-03-20 RX ORDER — ONDANSETRON 2 MG/ML
4 INJECTION INTRAMUSCULAR; INTRAVENOUS EVERY 30 MIN PRN
Status: DISCONTINUED | OUTPATIENT
Start: 2018-03-20 | End: 2018-03-20 | Stop reason: HOSPADM

## 2018-03-20 RX ORDER — ACETAMINOPHEN 325 MG/1
650 TABLET ORAL EVERY 4 HOURS PRN
Status: DISCONTINUED | OUTPATIENT
Start: 2018-03-20 | End: 2018-03-20

## 2018-03-20 RX ORDER — POTASSIUM CHLORIDE 29.8 MG/ML
20 INJECTION INTRAVENOUS
Status: DISCONTINUED | OUTPATIENT
Start: 2018-03-20 | End: 2018-03-20

## 2018-03-20 RX ORDER — OXYCODONE HYDROCHLORIDE 5 MG/1
5-10 TABLET ORAL
Status: DISCONTINUED | OUTPATIENT
Start: 2018-03-20 | End: 2018-03-21

## 2018-03-20 RX ORDER — CEFAZOLIN SODIUM 2 G/100ML
2 INJECTION, SOLUTION INTRAVENOUS
Status: COMPLETED | OUTPATIENT
Start: 2018-03-20 | End: 2018-03-20

## 2018-03-20 RX ORDER — CEFAZOLIN SODIUM 1 G
1 VIAL (EA) INJECTION EVERY 8 HOURS
Status: COMPLETED | OUTPATIENT
Start: 2018-03-21 | End: 2018-03-21

## 2018-03-20 RX ORDER — POLYETHYLENE GLYCOL 3350 17 G/17G
17 POWDER, FOR SOLUTION ORAL DAILY PRN
Status: DISCONTINUED | OUTPATIENT
Start: 2018-03-20 | End: 2018-03-20

## 2018-03-20 RX ORDER — FENTANYL CITRATE 50 UG/ML
INJECTION, SOLUTION INTRAMUSCULAR; INTRAVENOUS PRN
Status: DISCONTINUED | OUTPATIENT
Start: 2018-03-20 | End: 2018-03-20

## 2018-03-20 RX ADMIN — PHENYLEPHRINE HYDROCHLORIDE 100 MCG: 10 INJECTION INTRAVENOUS at 16:26

## 2018-03-20 RX ADMIN — ACETAMINOPHEN 1000 MG: 10 INJECTION, SOLUTION INTRAVENOUS at 18:19

## 2018-03-20 RX ADMIN — FENTANYL CITRATE 50 MCG: 50 INJECTION, SOLUTION INTRAMUSCULAR; INTRAVENOUS at 16:45

## 2018-03-20 RX ADMIN — SODIUM CHLORIDE: 9 INJECTION, SOLUTION INTRAVENOUS at 19:52

## 2018-03-20 RX ADMIN — LIDOCAINE HYDROCHLORIDE 60 MG: 20 INJECTION, SOLUTION INFILTRATION; PERINEURAL at 16:25

## 2018-03-20 RX ADMIN — ONDANSETRON 4 MG: 2 INJECTION INTRAMUSCULAR; INTRAVENOUS at 16:59

## 2018-03-20 RX ADMIN — CEFAZOLIN SODIUM 2 G: 2 INJECTION, SOLUTION INTRAVENOUS at 16:30

## 2018-03-20 RX ADMIN — ROCURONIUM BROMIDE 50 MG: 10 INJECTION INTRAVENOUS at 16:25

## 2018-03-20 RX ADMIN — SUGAMMADEX 200 MG: 100 INJECTION, SOLUTION INTRAVENOUS at 17:03

## 2018-03-20 RX ADMIN — PROPOFOL 20 MG: 10 INJECTION, EMULSION INTRAVENOUS at 16:17

## 2018-03-20 RX ADMIN — FENTANYL CITRATE 50 MCG: 50 INJECTION, SOLUTION INTRAMUSCULAR; INTRAVENOUS at 16:35

## 2018-03-20 RX ADMIN — SODIUM CHLORIDE, POTASSIUM CHLORIDE, SODIUM LACTATE AND CALCIUM CHLORIDE: 600; 310; 30; 20 INJECTION, SOLUTION INTRAVENOUS at 15:01

## 2018-03-20 RX ADMIN — SODIUM CHLORIDE: 9 INJECTION, SOLUTION INTRAVENOUS at 01:10

## 2018-03-20 RX ADMIN — PROPOFOL 10 MG: 10 INJECTION, EMULSION INTRAVENOUS at 16:26

## 2018-03-20 RX ADMIN — FENTANYL CITRATE 50 MCG: 50 INJECTION, SOLUTION INTRAMUSCULAR; INTRAVENOUS at 16:25

## 2018-03-20 RX ADMIN — PROPOFOL 100 MG: 10 INJECTION, EMULSION INTRAVENOUS at 16:25

## 2018-03-20 RX ADMIN — DEXMEDETOMIDINE HYDROCHLORIDE 12 MCG: 100 INJECTION, SOLUTION INTRAVENOUS at 17:07

## 2018-03-20 RX ADMIN — TRANEXAMIC ACID 1 G: 100 INJECTION, SOLUTION INTRAVENOUS at 16:38

## 2018-03-20 NOTE — OP NOTE
Procedure Date: 03/20/2018      PREOPERATIVE DIAGNOSIS:  Displaced right femoral neck fracture.      POSTOPERATIVE DIAGNOSIS:  Displaced right femoral neck fracture.      PROCEDURE:  Open reduction and internal fixation of displaced right femoral neck fracture using bipolar hemiarthroplasty.      ANESTHESIA:  General.      ASSISTANT:  JODEE Perez      INDICATIONS:  This 77-year-old woman fell yesterday sustaining the above-described injury.  Appropriate risks and alternatives were discussed at length, and she has elected to proceed with surgical intervention.      DESCRIPTION OF PROCEDURE:  The patient was brought to the operating room, given a general anesthetic, positioned with the left side down, right hip prepped and draped sterilely in the usual manner.      I made our standard lateral incision.  Dissection was carried down sharply to the fascia.  Fascia was split in line with its fibers.  The anterior one-half of the abductor was dissected free off the greater trochanter, capsule opened and the hip dislocated to the fracture site.  The fracture was a high femoral neck location.  I used the jig system to osteotomize the femoral neck to an appropriate level.      We delivered the femoral head from the joint and measured this to be a 45 mm diameter.  We trialed the 45 mm component and this fit very nicely.      Attention was turned back to the femur.  Progressively larger reamers and broaches were used until we had sized for a #12 femoral component, high offset.  Trial reduction was done and with a 0 neck length head, this gave us excellent restoration of leg length with excellent stability throughout the range of motion.      Trial components were removed.  All bony surfaces were water picked with antibiotic solution.  Real femoral component driven into position.  We retrialed the neck and again chose a 0 neck length, 28 mm head.  This was snapped into the 45 mm bipolar.  Hip was finally relocated and  again had excellent range of motion with excellent stability and excellent restoration of leg length.      Wound was irrigated copiously with antibiotic solution.  Hemostasis was obtained by electrocautery.  Closure done in layers, closing the abductors back to the greater trochanter with #1 Vicryl, fascia with #1 Vicryl, subcutaneous tissue with 2-0 Vicryl, staples on the skin, sterile compressive dressing applied.  The patient awakened and taken to the recovery room in good condition.  There were no intraoperative complications.  Blood loss 150 mL.         SHARRI ARNOLD MD             D: 2018   T: 2018   MT: PERLA      Name:     MARGO MCLEAN   MRN:      [id number]        Account:        [id number]   :      1940           Procedure Date: 2018      Document: [id number]

## 2018-03-20 NOTE — CONSULTS
Consult Date:  2018      CHIEF COMPLAINT:  Right hip pain.      HISTORY OF PRESENT ILLNESS:  This 77-year-old woman who was admitted through the emergency room last evening having fallen injuring her right hip.  Evaluation in the emergency room reveals a displaced femoral neck fracture.      PHYSICAL EXAMINATION:     GENERAL:  The patient is alert.  She is disoriented, which is her baseline.  Her  is present.     EXTREMITIES:  Her right leg is painful to her on any movement.  Neurovascular status is intact.      IMAGING:  X-rays show a displaced femoral neck fracture, right hip.      IMPRESSION:  Displaced femoral neck fracture, right hip.      PLAN:  I discussed risks, options and alternatives of management with the patient and .  I recommended bipolar hemiarthroplasty.  We talked about how this could occur, the risks, the expected results and the rehabilitation required.  She and her  understand and do want to proceed.  We will schedule the surgery as soon as possible.         SHARRI ARNOLD MD             D: 2018   T: 2018   MT: PERLA      Name:     MARGO CMLEAN   MRN:      [id number]        Account:       [id number]   :      1940           Consult Date:  2018      Document: [id number]

## 2018-03-20 NOTE — ANESTHESIA PREPROCEDURE EVALUATION
Procedure: Procedure(s):  OPEN REDUCTION INTERNAL FIXATION HIP BIPOLAR  Preop diagnosis: FEMUR FRACTURE.    Allergies   Allergen Reactions     Sulfa Drugs      30 yrs ago didn't feel well     Past Medical History:   Diagnosis Date     Displacement of intervertebral disc, site unspecified, without myelopathy 1/88    Herniated disc     Lateral epicondylitis  of elbow      Other internal derangement of knee(717.89)     Internal derangement, knee     Other internal derangement of knee(717.89)     Internal derangement, knee     Past Surgical History:   Procedure Laterality Date     C NONSPECIFIC PROCEDURE      T&A     C NONSPECIFIC PROCEDURE  1983    Tennis elbow surgery     C NONSPECIFIC PROCEDURE  1949    Left leg surgery secondary to accident     COLONOSCOPY  2/8/2013    Procedure: COLONOSCOPY;  Colonoscopy No biopsies taken. ;  Surgeon: Chavez Guillaume MD;  Location:  GI     HC KNEE SCOPE, DIAGNOSTIC  Left 2000    Arthroscopy, Knee     HC KNEE SCOPE, DIAGNOSTIC  Right 2001    Arthroscopy, Knee     PHOTOREFRACTIVE KERATECTOMY  2000    Right     Social History   Substance Use Topics     Smoking status: Former Smoker     Packs/day: 0.50     Years: 15.00     Quit date: 8/11/1970     Smokeless tobacco: Never Used      Comment: QUIT 25 YEARS AGO     Alcohol use No     Prior to Admission medications    Medication Sig Start Date End Date Taking? Authorizing Provider   memantine XR (NAMENDA XR) 28 MG 24 hr capsule Take 28 mg by mouth daily   Yes Unknown, Entered By History   buPROPion (WELLBUTRIN XL) 150 MG 24 hr tablet Take 150 mg by mouth daily   Yes Unknown, Entered By History   LORazepam (ATIVAN PO) Take 0.5 mg by mouth every 8 hours as needed for anxiety    Yes Reported, Patient   donepezil (ARICEPT) 5 MG tablet Take 1 tablet (5 mg) by mouth At Bedtime 3/10/17  Yes Saul Wheeler MD     Current Facility-Administered Medications Ordered in Epic   Medication Dose Route Frequency Last Rate Last Dose     [Auto  Hold] naloxone (NARCAN) injection 0.1-0.4 mg  0.1-0.4 mg Intravenous Q2 Min PRN         [Auto Hold] melatonin tablet 1 mg  1 mg Oral At Bedtime PRN         sodium chloride 0.9% infusion   Intravenous Continuous 75 mL/hr at 03/20/18 0110       [Auto Hold] potassium chloride SA (K-DUR/KLOR-CON M) CR tablet 20-40 mEq  20-40 mEq Oral Q2H PRN         [Auto Hold] potassium chloride (KLOR-CON) Packet 20-40 mEq  20-40 mEq Oral or Feeding Tube Q2H PRN         [Auto Hold] potassium chloride 10 mEq in 100 mL sterile water intermittent infusion (premix)  10 mEq Intravenous Q1H PRN         [Auto Hold] potassium chloride 10 mEq in 100 mL intermittent infusion with 10 mg lidocaine  10 mEq Intravenous Q1H PRN         [Auto Hold] potassium chloride 20 mEq in 50 mL intermittent infusion  20 mEq Intravenous Q1H PRN         [Auto Hold] acetaminophen (TYLENOL) tablet 650 mg  650 mg Oral Q4H PRN         [Auto Hold] senna-docusate (SENOKOT-S;PERICOLACE) 8.6-50 MG per tablet 1 tablet  1 tablet Oral BID PRN        Or     [Auto Hold] senna-docusate (SENOKOT-S;PERICOLACE) 8.6-50 MG per tablet 2 tablet  2 tablet Oral BID PRN         [Auto Hold] polyethylene glycol (MIRALAX/GLYCOLAX) Packet 17 g  17 g Oral Daily PRN         [Auto Hold] bisacodyl (DULCOLAX) Suppository 10 mg  10 mg Rectal Daily PRN         ceFAZolin (ANCEF) intermittent infusion 2 g in 100 mL dextrose PRE-MIX  2 g Intravenous Pre-Op/Pre-procedure x 1 dose         ceFAZolin (ANCEF) 1g in 10 mL SWFI premix for IVP  1 g Intravenous See Admin Instructions         tranexamic acid (CYKLOKAPRON) 1 g in sodium chloride 0.9 % 60 mL bolus  1 g Intravenous Once         lactated ringers infusion   Intravenous Continuous 25 mL/hr at 03/20/18 1501       No current Epic-ordered outpatient prescriptions on file.       sodium chloride 75 mL/hr at 03/20/18 0110     lactated ringers 25 mL/hr at 03/20/18 1501     Wt Readings from Last 1 Encounters:   03/19/18 49.9 kg (110 lb)     Temp Readings from  Last 1 Encounters:   03/20/18 36.3  C (97.4  F) (Axillary)     BP Readings from Last 6 Encounters:   03/20/18 135/79   07/10/17 122/84   07/05/17 181/57   02/23/16 118/70   01/11/16 128/72   02/18/15 128/76     Pulse Readings from Last 4 Encounters:   03/20/18 62   07/10/17 100   07/05/17 72   02/23/16 68     Resp Readings from Last 1 Encounters:   03/20/18 16     SpO2 Readings from Last 1 Encounters:   03/20/18 94%     Recent Labs   Lab Test  03/19/18 2156 07/05/17   1145   NA  135  143   POTASSIUM  4.4  4.3   CHLORIDE  100  107   CO2  26  31   ANIONGAP  9  5   GLC  102*  91   BUN  31*  14   CR  0.73  0.81   AWILDA  8.1*  8.9     Recent Labs   Lab Test  07/05/17   1145  03/10/17   1033   08/01/14   0911   AST  20  16   < >  24   ALT  19  21   < >  24   ALKPHOS  69  76   < >  58   BILITOTAL  0.4  0.4   < >  0.6   LIPASE   --    --    --   135    < > = values in this interval not displayed.     Recent Labs   Lab Test  03/19/18 2156 07/05/17   1145   WBC  9.8  6.0   HGB  12.5  14.5   PLT  250  242     Recent Labs   Lab Test  03/19/18 2156   ABO  O   RH  Pos     Recent Labs   Lab Test  03/19/18 2156   INR  0.92   PTT  28      No results for input(s): TROPI in the last 87599 hours.  No results for input(s): PH, PCO2, PO2, HCO3 in the last 47725 hours.  No results for input(s): HCG in the last 46375 hours.  Recent Results (from the past 744 hour(s))   XR Pelvis w Hip Right 1 View    Narrative    PELVIS AND HIP RIGHT ONE VIEW   3/19/2018 9:22 PM     HISTORY: Pain.    COMPARISON: 2/23/2016.      Impression    IMPRESSION: Acute impacted fracture through the right femoral neck.  Right femoral head remains located at the hip joint. Left hip shows no  acute abnormality.        JAMES BENTON MD   Knee XR, 3 views, left    Narrative    LEFT KNEE THREE VIEWS     3/19/2018 9:23 PM     HISTORY: Fall with left knee pain and bruising.    COMPARISON: None.      Impression    IMPRESSION: No acute fracture. Anatomic alignment.  Small osteophytes  noted.       JAMES BENTON MD   XR Chest 1 View    Narrative    CHEST ONE VIEW   3/19/2018  10:14 PM     HISTORY: Pain.    COMPARISON: Chest CT 12/17/2007.      Impression    IMPRESSION: Pulmonary vasculature is indistinct suggestive of vascular  congestion. Cardiac silhouette within normal limits. No definite focal  airspace opacity. No pleural effusion or pneumothorax identified on  supine film. The bones are unremarkable.    ULICES BRISCOE MD       RECENT LABS:   ECG:   ECHO:       Anesthesia Evaluation     .             ROS/MED HX    ENT/Pulmonary:       Neurologic: Comment: Alzheimer's Dz    (+)dementia, other neuro herniated disc    Cardiovascular:     (+) Dyslipidemia, ----. : . . . :. .       METS/Exercise Tolerance:     Hematologic:         Musculoskeletal:   (+) arthritis, , , other musculoskeletal (osteoporosis)-       GI/Hepatic:         Renal/Genitourinary:         Endo:         Psychiatric:         Infectious Disease:         Malignancy:         Other:                     Physical Exam  Normal systems: dental    Airway   Mallampati: I  TM distance: >3 FB  Neck ROM: full    Dental     Cardiovascular   Rhythm and rate: regular and normal      Pulmonary    breath sounds clear to auscultation                    Anesthesia Plan      History & Physical Review  History and physical reviewed and following examination; no interval change.    ASA Status:  3 .    NPO Status:  > 8 hours    Plan for General and ETT with Intravenous and Propofol induction. Maintenance will be Balanced.    PONV prophylaxis:  Ondansetron (or other 5HT-3)       Postoperative Care  Postoperative pain management:  IV analgesics.      Consents  Anesthetic plan, risks, benefits and alternatives discussed with:  Patient..                          .

## 2018-03-20 NOTE — ANESTHESIA POSTPROCEDURE EVALUATION
Patient: Adriane Flores    Procedure(s):  RIGHT BIPOLAR HIP FRACTURE.(SMITH/NEPHEW) - Wound Class: I-Clean    Diagnosis:FEMUR FRACTURE.  Diagnosis Additional Information: No value filed.    Anesthesia Type:  General, ETT    Note:  Anesthesia Post Evaluation    Patient location during evaluation: PACU  Patient participation: Able to fully participate in evaluation  Level of consciousness: awake  Pain management: adequate  Airway patency: patent  Respiratory status: acceptable  Hydration status: acceptable  PONV: none     Anesthetic complications: None          Last vitals:  Vitals:    03/20/18 1800 03/20/18 1810 03/20/18 1820   BP: 104/68 145/81 (!) 152/95   Pulse:      Resp: 14 11 11   Temp: 35.6  C (96.1  F) 35.8  C (96.4  F) 36.1  C (97  F)   SpO2: (!) 78% 91% 95%         Electronically Signed By: Johnna Jaime  March 20, 2018  6:27 PM

## 2018-03-20 NOTE — PROGRESS NOTES
[facility]    Hospitalist Progress Note    Date of Service (when I saw the patient): 03/20/2018 Chart Check, Patient in OR    Assessment & Plan    Adriane Flores is a 77-year-old woman with advanced dementia and osteoporosis, chronic right hip pain, was brought to the ER for evaluation due to recent fall and unable to ambulate.       Closed fracture through the R femoral neck with impaction, likely sustained due to a fall 3 days ago.  No loss of consciousness.    - In OR undergoing R bipolar hip fracture. No cardiovascular concerns or otherwise prior to surgery.   - Routine post-operative care, including pain mgt and DVT prophylaxis, per surgical team.     Advanced Alzheimer's Dementia   - Continued on PTA Namenda and Aricept and PRN ativan.  - Minimize narcotics or benzos as able.   - Discussed with nursing the importance of maintaining a normal sleep/wake cycle.     DVT prophylaxis -  PCDs for now.  Postop DVT prophylaxis per Orthopedic Surgery.     Code status Full code,  this was discussed with her .       DISPOSITION:  Anticipate 3-4 days of hospital stay.  Postop PT, OT evaluation per Orthopedic Surgery,  discharge planning based on postop recovery and therapy evaluation.  May need short-term rehab,  consulted.  Plan of care discussed with patient's  present in the room.     Ariadne Rodas  [phone] (p)  Text Page (7AM - 6PM)     Interval History   IN OR    -Data reviewed today: I reviewed all new labs and imaging results over the last 24 hours. I personally reviewed no images or EKG's today.    Physical Exam   Temp: 97.4  F (36.3  C) Temp src: Axillary BP: 135/79 Pulse: 62 Heart Rate: 60 Resp: 16 SpO2: 94 % O2 Device: None (Room air)    Vitals:    03/19/18 1933   Weight: 49.9 kg (110 lb)     Vital Signs with Ranges  Temp:  [97  F (36.1  C)-97.9  F (36.6  C)] 97.4  F (36.3  C)  Pulse:  [61-80] 62  Heart Rate:  [60-77] 60  Resp:  [14-16] 16  BP:  (134-165)/(53-91) 135/79  SpO2:  [94 %-98 %] 94 %  I/O last 3 completed shifts:  In: 431 [I.V.:431]  Out: -       Medications   All medications reviewed on 03/20/18, PTA meds signed and held.      sodium chloride 75 mL/hr at 03/20/18 0110     lactated ringers Stopped (03/20/18 1707)       ceFAZolin  1 g Intravenous See Admin Instructions       Data     Recent Labs  Lab 03/19/18  2156   WBC 9.8   HGB 12.5   MCV 92      INR 0.92      POTASSIUM 4.4   CHLORIDE 100   CO2 26   BUN 31*   CR 0.73   ANIONGAP 9   AWILDA 8.1*   *       Recent Results (from the past 24 hour(s))   XR Pelvis w Hip Right 1 View    Narrative    PELVIS AND HIP RIGHT ONE VIEW   3/19/2018 9:22 PM     HISTORY: Pain.    COMPARISON: 2/23/2016.      Impression    IMPRESSION: Acute impacted fracture through the right femoral neck.  Right femoral head remains located at the hip joint. Left hip shows no  acute abnormality.        JAMES BENTON MD   Knee XR, 3 views, left    Narrative    LEFT KNEE THREE VIEWS     3/19/2018 9:23 PM     HISTORY: Fall with left knee pain and bruising.    COMPARISON: None.      Impression    IMPRESSION: No acute fracture. Anatomic alignment. Small osteophytes  noted.       JAMES BENTON MD   XR Chest 1 View    Narrative    CHEST ONE VIEW   3/19/2018  10:14 PM     HISTORY: Pain.    COMPARISON: Chest CT 12/17/2007.      Impression    IMPRESSION: Pulmonary vasculature is indistinct suggestive of vascular  congestion. Cardiac silhouette within normal limits. No definite focal  airspace opacity. No pleural effusion or pneumothorax identified on  supine film. The bones are unremarkable.    ULICES BRISCOE MD

## 2018-03-20 NOTE — ANESTHESIA CARE TRANSFER NOTE
Patient: Adriane Flores    Procedure(s):  RIGHT BIPOLAR HIP FRACTURE.(SMITH/NEPHEW) - Wound Class: I-Clean    Diagnosis: FEMUR FRACTURE.  Diagnosis Additional Information: No value filed.    Anesthesia Type:   General, ETT     Note:  Airway :Face Mask  Patient transferred to:PACU  Handoff Report: Identifed the Patient, Identified the Reponsible Provider, Reviewed the pertinent medical history, Discussed the surgical course, Reviewed Intra-OP anesthesia mangement and issues during anesthesia, Set expectations for post-procedure period and Allowed opportunity for questions and acknowledgement of understanding      Vitals: (Last set prior to Anesthesia Care Transfer)    CRNA VITALS  3/20/2018 1704 - 3/20/2018 1742      3/20/2018             Pulse: 70    SpO2: 99 %    Resp Rate (observed): 23                Electronically Signed By: BERNA Melton CRNA  March 20, 2018  5:42 PM

## 2018-03-20 NOTE — PROGRESS NOTES
Aware of patient's femur fracture - planning for surgery this afternoon    Keep NPO  Pain medication as needed  Hold all anticoagulation  Bed rest    Formal consult note to follow    Sophia Carpio PAC  [phone]

## 2018-03-20 NOTE — ED NOTES
"[facility]  ED Nurse Handoff Report    ED Chief complaint: Fall (Fell while walking up the stairs on Thurs. Bruising noted on the R hip and L knee. worsening ambulation. Hx of dementia. )      ED Diagnosis:   Final diagnoses:   None       Code Status: Full Code    Allergies:   Allergies   Allergen Reactions     Sulfa Drugs      30 yrs ago didn't feel well       Activity level - Baseline/Home:  Independent    Activity Level - Current:   Total Care     Needed?: No    Isolation: No  Infection: Not Applicable    Bariatric?: No    Vital Signs:   Vitals:    03/19/18 1933   BP: 146/70   Resp: 16   Temp: 97  F (36.1  C)   TempSrc: Oral   SpO2: 94%   Weight: 49.9 kg (110 lb)   Height: 1.575 m (5' 2\")       Cardiac Rhythm: ,        Pain level:      Is this patient confused?: Yes, hx of dementia.    Patient Report: Initial Complaint: Adriane Flores is a 77 year old female who presents with her  to the ED for evaluation of fall. Pt has hx of dementia. Pt's  reports that the pt had fell last Thursday while walking up stairs.  reports that the pt was with the caregiver at the time. Since the fall, the pt has not been able to ambulate.  states that the pt is at her baseline and denies LOC, head trauma, or any other injuries.  Focused Assessment: Cardiac: WDL  Resp: WDL  Neuro: Confused, pleasantly demented.   Musculoskeletal: Bruising to R hip and bilat knees  Tests Performed: Labs, EKG, Xray  Abnormal Results: See Xray  Treatments provided: N/A    Family Comments:  at bedside    OBS brochure/video discussed/provided to patient: No    ED Medications: Medications - No data to display    Drips infusing?:  No    For the majority of the shift this patient was Green.   Interventions performed were monitoring, comfort measures.    Severe Sepsis OR Septic Shock Diagnosis Present: No      ED NURSE PHONE NUMBER: *[phone]         "

## 2018-03-20 NOTE — PHARMACY-ADMISSION MEDICATION HISTORY
Admission medication history interview status for the 3/19/2018  admission is complete. See EPIC admission navigator for prior to admission medications     Medication history source reliability:Good    Actions taken by pharmacist (provider contacted, etc):PTA med list per pts spouse     Additional medication history information not noted on PTA med list :Pt is prescribed Aricept 5 mg at HS but per pts  pt is usually asleep before he can give it to her so does not take on a consistent basis    Medication reconciliation/reorder completed by provider prior to medication history? Yes    Time spent in this activity: 15 minutes      Prior to Admission medications    Medication Sig Last Dose Taking? Auth Provider   memantine XR (NAMENDA XR) 28 MG 24 hr capsule Take 28 mg by mouth daily  Yes Unknown, Entered By History   buPROPion (WELLBUTRIN XL) 150 MG 24 hr tablet Take 150 mg by mouth daily  Yes Unknown, Entered By History   LORazepam (ATIVAN PO) Take 0.5 mg by mouth every 8 hours as needed for anxiety  PRN Yes Reported, Patient   donepezil (ARICEPT) 5 MG tablet Take 1 tablet (5 mg) by mouth At Bedtime Past Month at Unknown time Yes Saul Wheeler MD

## 2018-03-20 NOTE — PROGRESS NOTES
RECEIVING UNIT ED HANDOFF REVIEW    ED Nurse Handoff Report was reviewed by: Sophia Stapleton on March 20, 2018 at 12:04 AM

## 2018-03-20 NOTE — BRIEF OP NOTE
[facility] Brief Operative Note    Pre-operative diagnosis: FEMUR FRACTURE.   Post-operative diagnosis * No post-op diagnosis entered *     Procedure: Procedure(s):  RIGHT BIPOLAR HIP FRACTURE.(SMITH/NEPHEW) - Wound Class: I-Clean   Surgeon(s): Surgeon(s) and Role:     * Cayetano Ellington MD - Primary   Estimated blood loss: 150 mL    Specimens: * No specimens in log *   Findings:

## 2018-03-20 NOTE — H&P
Admitted:     03/19/2018      DATE OF ADMISSION:  03/19/2015      PRIMARY CARE PHYSICIAN:  Saul Wheeler MD.      REASON FOR ADMISSION:  Right femoral neck fracture.      HISTORY OF PRESENT ILLNESS:  Adriane Flores is a 77-year-old woman with history of Alzheimer's dementia and osteoporosis who was brought to the ER by her  for evaluation.  I discussed with patient's  since patient seems to have advanced dementia, and so  she is unable to provide any information.  I also discussed with the ED care provider for further information and reviewed in Epic for further information.        As per the , the  patient fell 3 days ago while going up the stairs.  It was witnessed by the caregiver.  There was no loss of consciousness.  It was a mechanical fall.  She fell 2 previous times in the last 6 months.  The patient was able to ambulate and they used to go for a walk, but the patient refused to ambulate today, and she could barely stand up, and he also noticed bruises on both knees and right hip, and so brought to the ER for further evaluation.      The patient cannot provide any meaningful information.  She is not expressing any pain.  She was taking Naprosyn prior to coming to the ER.  No report of any chest pain, exertional dyspnea, orthopnea, PND or syncopal episodes per her . The patient used to run in the past, but had been complaining of hip pain that would stop her from walking.      REVIEW OF SYSTEMS:  All 10-point systems were reviewed and is negative other than mentioned in the HPI.      PAST MEDICAL HISTORY:   1.  Alzheimer's dementia.   2.  Osteoporosis.   3.  Hyperlipidemia.   4.  Herniated disk.      ALLERGIES:  SULFA DRUGS.      MEDICATIONS:  Medication reconciliation is pending, but it looks like the patient takes Namenda, Aricept, Ativan, multivitamin, glucosamine, calcium and vitamin D supplements.      PAST SURGICAL HISTORY:     1.  Repair of herniated disk.   2.   Keratotomy, right.   3.  Colonoscopy.   4.  Tonsillectomy and adenoidectomy.   5.  Tennis elbow surgery.   6.  Left leg surgery after an accident.   7.  Arthroscopy of bilateral knee.      FAMILY HISTORY:  Reviewed and noncontributory to her presentation, but it was noted that mom had drug and alcohol issues and  at 79.  Father had CVA and  at age 69.  Brother with issues of alcoholism.  Maternal aunt with colorectal surgery.      SOCIAL HISTORY:  Former smoker, quit in .  No alcohol or recreational drug use.  Lives with her .  She has caregiver 6 days a week, except , from 8-5.      PHYSICAL EXAMINATION:   GENERAL:  The patient is alert, awake, oriented x 0.  She does not appear to be in distress.   VITAL SIGNS:  Blood pressure 146/70, heart rate 77, temperature 97, respirations 16, pulse ox 94 on room air.   HEENT:  PERRLA, EOMI.  Oral mucosa moist.   NECK:  Supple.   LUNGS:  Bilateral equal air entry, clear to auscultation.   CARDIOVASCULAR:  S1, S2, regular, no murmur, rub, gallop.  No tachycardia.   ABDOMEN:  Soft, nontender, bowel sounds active.   MUSCULOSKELETAL:  Right lower extremity is slightly shortened and externally rotated.  She has bruises on her right over both the knees and also right hip and buttock.  No tenderness elicited.      LABORATORY DATA:  White cell count 9.8, hemoglobin 12.5, hematocrit 37.7, platelets 250.  Blood sugar 102, sodium 135, potassium 4.4, chloride 100, bicarbonate 26, creatinine 0.73, calcium 8.1.  INR 0.92.      IMAGING:  Chest x-ray is negative for any obvious pulmonary edema, effusion, pneumothorax or consolidation.  This has been reported as possible vascular congestion. Hip and pelvis x-ray shows acute impacted fracture through the right femoral neck.      A 12-lead EKG was reviewed by me, which is sinus with nonspecific ST changes on the anterior leads.  No prior EKG for comparison.      ASSESSMENT AND PLAN:  Adriane Flores is a 77-year-old  woman with advanced dementia and osteoporosis, chronic right hip pain, was brought to the ER for evaluation due to recent fall and unable to ambulate.      1.  Closed fracture through the right femoral neck with impaction, likely sustained due to a fall 3 days ago.  No loss of consciousness.  The patient seems comfortable without reporting any pain.  Dr. Ellington from Orthopedic Surgery was contacted from the ER and is planning surgery tomorrow.  Does not have any anginal symptoms as reported by her  or any arrhythmia on EKG.   Has nonspecific EKG changes, and in the absence of any reported symptoms, no further cardiac workup is indicated at this point.  Lab workup are within normal limits, and patient can go for surgery without any further cardiac workup at this point.  Discussed with  regarding postoperative delirium.     2.  Alzheimer's dementia:    [id number] Namenda and Aricept will be resumed once med reconciliation completed.  She also is on Ativan as per med rec which her  is not aware of.  Minimize narcotics or benzos as able.     3.  Deep venous thrombosis prophylaxis:  PCDs for now.  Postop DVT prophylaxis per Orthopedic Surgery.     4.  Code status:  Full code,  this was discussed with her .      DISPOSITION:  Anticipate 3-4 days of hospital stay.  Postop PT, OT evaluation per Orthopedic Surgery,  discharge planning based on postop recovery and therapy evaluation.  May need short-term rehab,  consulted.  Plan of care discussed with patient's  present in the room.         TESSIE GARZA MD             D: 2018   T: 2018   MT: AGNES      Name:     MARGO MCLEAN   MRN:      [id number]        Account:      [id number]   :      1940        Admitted:     2018                   Document: [id number]       cc: Saul Wheeler MD

## 2018-03-20 NOTE — PLAN OF CARE
Problem: Patient Care Overview  Goal: Plan of Care/Patient Progress Review  Outcome: No Change  VSS on RA. Disoriented x4- Alzheimer's. Attendant with patient most of shift due to patient's confusion. No complaints or signs of pain. NPO. Incontinent of urine. IV fluids infusing.

## 2018-03-20 NOTE — ED PROVIDER NOTES
"  History     Chief Complaint:  Fall     HPI   HPI limited due to patient's dementia. HPI provided by patient's .     Adriane Flores is a 77 year old female, with a history of Alzheimer's and osteoarthrosis, who presents with her  to the ED for evaluation of mechanical fall. The patient's  reports the patient fell while walking up the stairs 3 days ago. She had a caregiver present with her. The  notes she has bruising to her right hip and bilateral knees. She was ambulatory initially but refused to ambulate today and can barely stand. The  reports the patient has been taking Naproxen with last dose this morning.  She ate supper around 6:00pm.   The  denies any mental status changes, loss of consciousness, head trauma, or other injuries.      Allergies:  Sulfa drugs      Medications:    Ativan   Namenda  Aricept  Caltrate+D  Multvitamins  Glucosamine sulfate     Past Medical History:    Herniated disc   Elbow lateral epicondylitis   Knee internal derangement   Alzheimer's  Dementia  HLD  Osteoarthrosis  Bone & cartilage disorder    Past Surgical History:    T&A  Right keratectomy   Tennis elbow surgery  Left leg surgery    Family History:    Alcohol/drug  CVA    Social History:   Smoking status: Former smoker, Quit date 8/11/1970  Alcohol use: No  Presents to ED with   Marital Status:   [2]     Review of Systems   Musculoskeletal: Positive for arthralgias and gait problem.   Neurological: Negative for syncope.   Psychiatric/Behavioral: Negative for confusion.     Physical Exam     Patient Vitals for the past 24 hrs:   BP Temp Temp src Heart Rate Resp SpO2 Height Weight   03/19/18 1933 146/70 97  F (36.1  C) Oral 77 16 94 % 1.575 m (5' 2\") 49.9 kg (110 lb)     Physical Exam  Nursing notes reviewed. Vitals reviewed.  General: Alert. Well kept.  Eyes:  Conjunctiva non-injected, non-icteric.  Ears:TM s normal.  Neck/Throat: Moist mucous membranes, oropharynx " clear without erythema or exudate. No cervical lymphadenopathy.  Normal voice.  Cardiac: Regular rhythm. Normal heart sounds with no murmur/rubs/click.   Pulmonary: Clear and equal breath sounds bilaterally. No crackles/rales. No wheezing  Abdomen: Soft. Non-distended. Non-tender to palpation. No masses. No guarding or rebound.  Musculoskeletal: right lower extremity shortened and rotated.  Full ROM of left lower extremity and bilateral upper extremities without pain.  No pain to palpation or with ROM of neck or to palpation of thoracic or lumbar spine.    Neurological: Alert and oriented x4.   Skin: Warm and dry without rashes or petechiae. Bruising on left knee and on right hip and right buttock.   Psych: Affect normal. Good eye contact.    Emergency Department Course     ECG (21:43:56):  Rate 69 bpm. HI interval 194. QRS duration 72. QT/QTc 422/452. P-R-T axes 83 23 62. Normal sinus rhythm. Cannot rule out anterior infarct, age undetermined. Abnormal ECG. Interpreted at 2148 by Dr. Murphy and reviewed by Diamond Mckeon, CNP.    Imaging:  Radiographic findings were communicated with the patient and family who voiced understanding of the findings.    XR Pelvis w Hip Right 1 View  IMPRESSION: Acute impacted fracture through the right femoral neck.  Right femoral head remains located at the hip joint. Left hip shows no  acute abnormality. As read by Radiology.     Knee XR, 3 Views, Left  IMPRESSION: No acute fracture. Anatomic alignment. Small osteophytes  noted. As read by Radiology.     XR Chest 1 View  IMPRESSION: Pulmonary vasculature is indistinct suggestive of vascular  congestion. Cardiac silhouette within normal limits. No definite focal  airspace opacity. No pleural effusion or pneumothorax identified on  supine film. The bones are unremarkable. As read by Radiology.     Laboratory:  INR: 0.92  PTT: 28  ABO/Rh type & screen: O Pos  CBC: o/w WNL (WBC 9.8, HGB 12.5, )  BMP: Glucose 102(H), BUN 31(H),  Calcium 8.1(L), o/w WNL (Creatinine 0.73)     Interventions:  None    Emergency Department Course:  Past medical records, nursing notes, and vitals reviewed.  2038: I performed an exam of the patient and obtained history, as documented above.    The patient was sent for a XRay while in the emergency department, findings above.    IV inserted and blood drawn.    2135: I rechecked the patient. Explained findings to patient and .    2155: I discussed the patient with Dr. Gino Murphy, in shared service, who also evaluated the patient.    2218: I spoke with Dr. Ellington of orthopedic surgery.  He will plan on surgery in the am at 9:00.    2315: I spoke to Dr. Fernández of the hospitalist service who accepts the patient for admission.     Findings and plan explained to the Patient and spouse who consents to admission. Discussed the patient with Dr. Fernández, who will admit the patient to an orthopedic bed for further monitoring, evaluation, and treatment.     Impression & Plan      Medical Decision Making:  Adriane Flores 77 year old female who presents for evaluation of right hip pain after mechanical fall 3 days ago. The patient's  notes that she had bruising along the right hip and right buttock, and today refused to ambulate on her leg. The patient does have underlying dementia, but was with a caretaker when she had the fall. They denied loss of consciousness or mental status changes. On physical exam, she has a right leg shortening and rotation with tenderness to palpation over the right hip, and bruising over the right posterior and lateral hip. She also has bruising over the left knee. X-ray today for the left knee was negative for any acute findings. Pelvis and hip x-ray shows impacted fracture through the right femoral neck with right femoral head within the hip joint. Lab work today is unremarkable. I will not place a catheter in the patient as she is not continent of urine and always wears  incontinence briefs. With her underlying dementia, I am concerned catheter would cause increased agitation. She's comfortable resting in bed, and there's no indication for additional pain medication today. I spoke with Dr. Ellington from orthopedic surgery in regards to surgery, and I also spoke with Dr. Fernández hospitalist who has accepted her for admission.     Diagnosis:    ICD-10-CM    1. Closed fracture of neck of right femur, initial encounter (H) S72.001A     Acute impacted fracture through the right femoral neck. Right femoral head remains located at the hip joint     Disposition: Patient admitted to an orthopedic bed by Dr. Jonh Arguello  3/19/2018    EMERGENCY DEPARTMENT    I, Courtney Arguello, am serving as a scribe at 8:38 PM on 3/19/2018 to document services personally performed by Diamond Mckeon CNP based on my observations and the provider's statements to me.     Emergency Department Attending Supervision Note    3/19/2018  9:57 PM    I evaluated this patient in conjunction with Diamond Mckeon CNP    Briefly, the patient presented with fall and hip injury.    My exam:  General: Resting uncomfortably on the gurney  Head:  The scalp, face, and head appear normal  Eyes:  The pupils are normal    Conjunctivae and sclera appear normal  MS:  There is pain and tenderness involving the right hip.    The patient can move her foot and ankle without difficulty.  MH:  Patient has dementia.    MDM: Patient presents after a fall.  The patient has a right femoral neck fracture which will require operative management.  She will be admitted to the hospital to the hospitalist with orthopedic surgery consulting.    My impression/diagnosis is:    Mildly displaced and impacted right femoral neck fracture.    Gino Murphy MD  Emergency Physician           Peck, Diamond, CNP  03/19/18 7735

## 2018-03-21 ENCOUNTER — APPOINTMENT (OUTPATIENT)
Dept: PHYSICAL THERAPY | Facility: CLINIC | Age: 78
DRG: 470 | End: 2018-03-21
Attending: ORTHOPAEDIC SURGERY
Payer: COMMERCIAL

## 2018-03-21 LAB
ANION GAP SERPL CALCULATED.3IONS-SCNC: 6 MMOL/L (ref 3–14)
BUN SERPL-MCNC: 16 MG/DL (ref 7–30)
CALCIUM SERPL-MCNC: 7.9 MG/DL (ref 8.5–10.1)
CHLORIDE SERPL-SCNC: 106 MMOL/L (ref 94–109)
CO2 SERPL-SCNC: 26 MMOL/L (ref 20–32)
CREAT SERPL-MCNC: 0.74 MG/DL (ref 0.52–1.04)
GFR SERPL CREATININE-BSD FRML MDRD: 76 ML/MIN/1.7M2
GLUCOSE SERPL-MCNC: 92 MG/DL (ref 70–99)
HGB BLD-MCNC: 11.6 G/DL (ref 11.7–15.7)
POTASSIUM SERPL-SCNC: 4.2 MMOL/L (ref 3.4–5.3)
SODIUM SERPL-SCNC: 138 MMOL/L (ref 133–144)

## 2018-03-21 PROCEDURE — A9270 NON-COVERED ITEM OR SERVICE: HCPCS | Mod: GY | Performed by: PHYSICIAN ASSISTANT

## 2018-03-21 PROCEDURE — 80048 BASIC METABOLIC PNL TOTAL CA: CPT | Performed by: ORTHOPAEDIC SURGERY

## 2018-03-21 PROCEDURE — 25000128 H RX IP 250 OP 636: Performed by: INTERNAL MEDICINE

## 2018-03-21 PROCEDURE — 97110 THERAPEUTIC EXERCISES: CPT | Mod: GP | Performed by: PHYSICAL THERAPIST

## 2018-03-21 PROCEDURE — 25000132 ZZH RX MED GY IP 250 OP 250 PS 637: Mod: GY | Performed by: PHYSICIAN ASSISTANT

## 2018-03-21 PROCEDURE — 40000193 ZZH STATISTIC PT WARD VISIT: Performed by: PHYSICAL THERAPIST

## 2018-03-21 PROCEDURE — 25000132 ZZH RX MED GY IP 250 OP 250 PS 637: Mod: GY | Performed by: ORTHOPAEDIC SURGERY

## 2018-03-21 PROCEDURE — 97162 PT EVAL MOD COMPLEX 30 MIN: CPT | Mod: GP | Performed by: PHYSICAL THERAPIST

## 2018-03-21 PROCEDURE — 36415 COLL VENOUS BLD VENIPUNCTURE: CPT | Performed by: ORTHOPAEDIC SURGERY

## 2018-03-21 PROCEDURE — 27210995 ZZH RX 272: Performed by: ORTHOPAEDIC SURGERY

## 2018-03-21 PROCEDURE — 85018 HEMOGLOBIN: CPT | Performed by: ORTHOPAEDIC SURGERY

## 2018-03-21 PROCEDURE — 25000128 H RX IP 250 OP 636: Performed by: ORTHOPAEDIC SURGERY

## 2018-03-21 PROCEDURE — 25000132 ZZH RX MED GY IP 250 OP 250 PS 637: Mod: GY | Performed by: INTERNAL MEDICINE

## 2018-03-21 PROCEDURE — A9270 NON-COVERED ITEM OR SERVICE: HCPCS | Mod: GY | Performed by: ORTHOPAEDIC SURGERY

## 2018-03-21 PROCEDURE — 12000007 ZZH R&B INTERMEDIATE

## 2018-03-21 PROCEDURE — 99232 SBSQ HOSP IP/OBS MODERATE 35: CPT | Performed by: INTERNAL MEDICINE

## 2018-03-21 PROCEDURE — A9270 NON-COVERED ITEM OR SERVICE: HCPCS | Mod: GY | Performed by: INTERNAL MEDICINE

## 2018-03-21 RX ORDER — ACETAMINOPHEN 325 MG/1
650 TABLET ORAL EVERY 8 HOURS
Qty: 60 TABLET | Refills: 0 | Status: SHIPPED | OUTPATIENT
Start: 2018-03-21 | End: 2018-04-05 | Stop reason: DRUGHIGH

## 2018-03-21 RX ORDER — TRAMADOL HYDROCHLORIDE 50 MG/1
50 TABLET ORAL EVERY 4 HOURS PRN
Status: DISCONTINUED | OUTPATIENT
Start: 2018-03-21 | End: 2018-03-24 | Stop reason: HOSPADM

## 2018-03-21 RX ORDER — ACETAMINOPHEN 325 MG/1
650 TABLET ORAL EVERY 4 HOURS PRN
Status: DISCONTINUED | OUTPATIENT
Start: 2018-03-21 | End: 2018-03-24 | Stop reason: HOSPADM

## 2018-03-21 RX ORDER — TRAMADOL HYDROCHLORIDE 50 MG/1
50 TABLET ORAL EVERY 4 HOURS PRN
Qty: 45 TABLET | Refills: 0 | Status: SHIPPED | OUTPATIENT
Start: 2018-03-21 | End: 2018-03-24

## 2018-03-21 RX ORDER — LORAZEPAM 0.5 MG/1
0.5 TABLET ORAL EVERY 8 HOURS PRN
Qty: 30 TABLET | Refills: 0 | Status: SHIPPED | OUTPATIENT
Start: 2018-03-21

## 2018-03-21 RX ADMIN — ACETAMINOPHEN 325 MG: 325 TABLET, FILM COATED ORAL at 23:59

## 2018-03-21 RX ADMIN — ENOXAPARIN SODIUM 40 MG: 40 INJECTION SUBCUTANEOUS at 13:34

## 2018-03-21 RX ADMIN — SODIUM CHLORIDE: 9 INJECTION, SOLUTION INTRAVENOUS at 13:45

## 2018-03-21 RX ADMIN — SODIUM CHLORIDE 250 ML: 9 INJECTION, SOLUTION INTRAVENOUS at 09:53

## 2018-03-21 RX ADMIN — Medication 1 MG: at 21:05

## 2018-03-21 RX ADMIN — MEMANTINE HYDROCHLORIDE 28 MG: 28 CAPSULE, EXTENDED RELEASE ORAL at 08:49

## 2018-03-21 RX ADMIN — CEFAZOLIN SODIUM 1 G: 10 INJECTION, POWDER, FOR SOLUTION INTRAVENOUS at 08:48

## 2018-03-21 RX ADMIN — TRAMADOL HYDROCHLORIDE 50 MG: 50 TABLET, COATED ORAL at 15:48

## 2018-03-21 RX ADMIN — ACETAMINOPHEN 325 MG: 325 TABLET, FILM COATED ORAL at 15:44

## 2018-03-21 RX ADMIN — DONEPEZIL HYDROCHLORIDE 5 MG: 5 TABLET, FILM COATED ORAL at 21:05

## 2018-03-21 RX ADMIN — CEFAZOLIN SODIUM 1 G: 10 INJECTION, POWDER, FOR SOLUTION INTRAVENOUS at 00:38

## 2018-03-21 RX ADMIN — ACETAMINOPHEN 325 MG: 325 TABLET, FILM COATED ORAL at 08:49

## 2018-03-21 NOTE — PLAN OF CARE
Problem: Fractured Hip (Adult)  Goal: Signs and Symptoms of Listed Potential Problems Will be Absent, Minimized or Managed (Fractured Hip)  Signs and symptoms of listed potential problems will be absent, minimized or managed by discharge/transition of care (reference Fractured Hip (Adult) CPG).   Outcome: No Change  Pt did not return from pacu during shift. Alert, disoriented X4 prior to surg. Calm and cooperative. NPO for surgery. T&R with [employer] in brief.

## 2018-03-21 NOTE — PROGRESS NOTES
[facility]    Hospitalist Progress Note    Date of Service (when I saw the patient): 03/21/2018 Chart Check, Patient in OR    Assessment & Plan    Adriane Flores is a 77-year-old woman with advanced dementia and osteoporosis, chronic right hip pain, was brought to the ER for evaluation due to recent fall and unable to ambulate.       Closed fracture through the R femoral neck with impaction, likely sustained due to a fall 3 days ago.  No loss of consciousness.    - s/p R bipolar hip hemiarthroplasty on 3/20.   - Routine post-operative care, including pain mgt and DVT prophylaxis, per surgical team. On lovenox.  - Using rare tramadol and scheduled low-dose tylenol for pain. Ordered PRN tylenol as well on 03/21/18     FEN  - Poor appetite and low UOP. Cr stable. Na 138, K 4.2.  > continue NS @ 75 cc/h on 03/21/18.    Advanced Alzheimer's Dementia  - Continued on PTA Namenda, Aricept, Bupropion and PRN ativan.  - Minimize narcotics.   - Try to maintain a normal sleep/wake cycle. Melatonin ordered.   - PRN Seroquel available ffor severe agitation/at danger to self/others during the day, or for persistent insomina, mild agitation at night.     DVT prophylaxis -  PCDs for now.  Postop DVT prophylaxis per Orthopedic Surgery.     Code status Full code,  this was discussed with her .       DISPOSITION:  Anticipate 1-2 more days of hospital stay.  Postop PT, OT evaluation per Orthopedic Surgery,  discharge planning based on postop recovery and therapy evaluation. Likely will need short-term rehab,  consulted.      Ariadne Rodas  [phone] (p)  Text Page (7AM - 6PM)     Interval History   No complaints, Can not give meaningful history due to dementia. Has been comfortable. Appetite low and given IVF boluses overnight for low UOP,Cr stable. See above.     -Data reviewed today: I reviewed all new labs and imaging results over the last 24 hours. I personally reviewed no images or  EKG's today.    Physical Exam   Temp: 98.2  F (36.8  C) Temp src: Axillary BP: 141/68 Pulse: 60 Heart Rate: 64 Resp: 14 SpO2: 99 % O2 Device: None (Room air) Oxygen Delivery: 1 LPM  Vitals:    03/19/18 1933   Weight: 49.9 kg (110 lb)     Vital Signs with Ranges  Temp:  [96.1  F (35.6  C)-98.6  F (37  C)] 98.2  F (36.8  C)  Pulse:  [60] 60  Heart Rate:  [46-70] 64  Resp:  [11-20] 14  BP: (104-152)/(49-95) 141/68  SpO2:  [78 %-100 %] 99 %  I/O last 3 completed shifts:  In: 1717.5 [I.V.:1717.5]  Out: 850 [Urine:700; Blood:150]     Constitutional:  NAD, frail.   Neuropsyche: alert and not oriented, does not answers questions appropriately.   Respiratory:Breathing comfortably, good air exchange, no wheezes, no crackles.   Cardiovascular:  Regular rate and rhythm, no edema.  GI:  soft, NT/ND, BS normal  Skin/Integumen:  No acute rash, bruising or sign of bleeding.         Medications   All medications reviewed on 03/21/18    sodium chloride 75 mL/hr at 03/21/18 1345       melatonin  1 mg Oral At Bedtime     buPROPion  150 mg Oral Daily     donepezil  5 mg Oral At Bedtime     memantine XR  28 mg Oral Daily     sodium chloride (PF)  3 mL Intracatheter Q8H     enoxaparin  40 mg Subcutaneous Q24H     acetaminophen  325 mg Oral Q8H       Data     Recent Labs  Lab 03/21/18  0701 03/20/18  2110 03/19/18  2156   WBC  --   --  9.8   HGB 11.6*  --  12.5   MCV  --   --  92   PLT  --  237 250   INR  --   --  0.92     --  135   POTASSIUM 4.2  --  4.4   CHLORIDE 106  --  100   CO2 26  --  26   BUN 16  --  31*   CR 0.74 0.74 0.73   ANIONGAP 6  --  9   AWILDA 7.9*  --  8.1*   GLC 92  --  102*       Recent Results (from the past 24 hour(s))   XR Pelvis w Hip Port Right 1 View    Narrative    PORTABLE PELVIS AND RIGHT HIP ONE VIEW   3/20/2018 6:19 PM     HISTORY: Postoperative hip arthroplasty.    COMPARISON: 3/19/2018      Impression    IMPRESSION: Interval right hip arthroplasty. The femoral prosthesis  appears located on the AP  projection. The lateral projection is  suboptimal as the acetabulum was not definitely visualized.     JEFFERY MORILLO MD

## 2018-03-21 NOTE — PLAN OF CARE
Problem: Patient Care Overview  Goal: Plan of Care/Patient Progress Review  Outcome: No Change  Pt arrived from PACU around 1945. Disoriented x4. Sleeping between cares. Intermittently was pulling at lines- mitts applied.  VSS on RA. CMS intact. Dressing CDI. James in place- low UOP overnight 150ml. NS @ 75- was not taking po overnight.

## 2018-03-21 NOTE — PROGRESS NOTES
Orthopedic Surgery  Adriane Flores  3/21/2018  Admit Date:  3/19/2018  POD # 1  S/P Right femur intertrochanteric fracture open reduction and internal fixation    Patient resting comfortably in bed, PT present to start in bed exercises. Patient is very sleepy    Pain controlled.  Tolerating oral intake.    Denies nausea or vomiting  Denies chest pain or shortness of breath  No events overnight.     Alert and orient to person and place with redirection  Vital Sign Ranges  Temperature Temp  Av.3  F (36.3  C)  Min: 96.1  F (35.6  C)  Max: 98.6  F (37  C)   Blood pressure Systolic (24hrs), Av , Min:104 , Max:152        Diastolic (24hrs), Av, Min:49, Max:95      Pulse Pulse  Av  Min: 60  Max: 60   Respirations Resp  Avg: 15.3  Min: 11  Max: 20   Pulse oximetry SpO2  Av.9 %  Min: 78 %  Max: 100 %       Dressing is clean, dry, and intact.   Minimal erythema of the surrounding skin.   Bilateral calves are soft, non-tender.  Bilateral lower extremity is NVI.  Sensation intact bilateral lower extremities  5/5 motor with resisted dorsi and plantar flexion bilaterally    Labs:  Recent Labs   Lab Test  18   0701  18   2156  17   1145   POTASSIUM  4.2  4.4  4.3     Recent Labs   Lab Test  18   0701  18   2156  17   1145   HGB  11.6*  12.5  14.5     Recent Labs   Lab Test  18   2156   INR  0.92     Recent Labs   Lab Test  18   2110  18   2156  17   1145   PLT  237  250  242       A/P  1. Plan   Continue Lovenox for DVT prophylaxis.     Mobilize with PT/OT    WBAT with assistive device.     Continue current pain regiment. Try to limit narcotics due to sleepiness   Leave dressing intact    2. Disposition   Anticipate d/c to based on recommendations from PT as well as progress. Likely TCU.    Sophia Carpio PA-C

## 2018-03-21 NOTE — PLAN OF CARE
Problem: Patient Care Overview  Goal: Plan of Care/Patient Progress Review  Outcome: No Change  Pt drowsy, awakens to voice. Disoriented x 4. Doesn't verbalize pain. Moans with turns. Scheduled Tylenol + ice packs for pain. Drsg on R hip CDI. CMS intact, unable to assess sensation. IVF infusing. Got 250ml bolus this am for low UOP. UOP this shift is 350ml. MD ok with keeping James in until tomorrow. Poor oral intake - only 25% breakfast eaten. Worked with PT.     Addendum: Tramadol x 1 for pain with turns. Ate good dinner. Continue to monitor.

## 2018-03-22 ENCOUNTER — APPOINTMENT (OUTPATIENT)
Dept: PHYSICAL THERAPY | Facility: CLINIC | Age: 78
DRG: 470 | End: 2018-03-22
Payer: COMMERCIAL

## 2018-03-22 LAB
ANION GAP SERPL CALCULATED.3IONS-SCNC: 7 MMOL/L (ref 3–14)
BUN SERPL-MCNC: 12 MG/DL (ref 7–30)
CALCIUM SERPL-MCNC: 8 MG/DL (ref 8.5–10.1)
CHLORIDE SERPL-SCNC: 109 MMOL/L (ref 94–109)
CO2 SERPL-SCNC: 25 MMOL/L (ref 20–32)
CREAT SERPL-MCNC: 0.72 MG/DL (ref 0.52–1.04)
GFR SERPL CREATININE-BSD FRML MDRD: 79 ML/MIN/1.7M2
GLUCOSE SERPL-MCNC: 99 MG/DL (ref 70–99)
HGB BLD-MCNC: 10.9 G/DL (ref 11.7–15.7)
POTASSIUM SERPL-SCNC: 3.8 MMOL/L (ref 3.4–5.3)
SODIUM SERPL-SCNC: 141 MMOL/L (ref 133–144)

## 2018-03-22 PROCEDURE — 97110 THERAPEUTIC EXERCISES: CPT | Mod: GP | Performed by: PHYSICAL THERAPIST

## 2018-03-22 PROCEDURE — 12000007 ZZH R&B INTERMEDIATE

## 2018-03-22 PROCEDURE — 25000128 H RX IP 250 OP 636: Performed by: ORTHOPAEDIC SURGERY

## 2018-03-22 PROCEDURE — 40000193 ZZH STATISTIC PT WARD VISIT: Performed by: PHYSICAL THERAPIST

## 2018-03-22 PROCEDURE — 25000132 ZZH RX MED GY IP 250 OP 250 PS 637: Mod: GY | Performed by: INTERNAL MEDICINE

## 2018-03-22 PROCEDURE — A9270 NON-COVERED ITEM OR SERVICE: HCPCS | Mod: GY | Performed by: ORTHOPAEDIC SURGERY

## 2018-03-22 PROCEDURE — A9270 NON-COVERED ITEM OR SERVICE: HCPCS | Mod: GY | Performed by: INTERNAL MEDICINE

## 2018-03-22 PROCEDURE — 36415 COLL VENOUS BLD VENIPUNCTURE: CPT | Performed by: ORTHOPAEDIC SURGERY

## 2018-03-22 PROCEDURE — 80048 BASIC METABOLIC PNL TOTAL CA: CPT | Performed by: ORTHOPAEDIC SURGERY

## 2018-03-22 PROCEDURE — 85018 HEMOGLOBIN: CPT | Performed by: ORTHOPAEDIC SURGERY

## 2018-03-22 PROCEDURE — 99232 SBSQ HOSP IP/OBS MODERATE 35: CPT | Performed by: INTERNAL MEDICINE

## 2018-03-22 PROCEDURE — 25000132 ZZH RX MED GY IP 250 OP 250 PS 637: Mod: GY | Performed by: ORTHOPAEDIC SURGERY

## 2018-03-22 RX ADMIN — ENOXAPARIN SODIUM 40 MG: 40 INJECTION SUBCUTANEOUS at 15:55

## 2018-03-22 RX ADMIN — Medication 12.5 MG: at 11:56

## 2018-03-22 RX ADMIN — ACETAMINOPHEN 325 MG: 325 TABLET, FILM COATED ORAL at 09:11

## 2018-03-22 RX ADMIN — BUPROPION HYDROCHLORIDE 150 MG: 150 TABLET, FILM COATED, EXTENDED RELEASE ORAL at 09:10

## 2018-03-22 RX ADMIN — Medication 1 MG: at 21:54

## 2018-03-22 RX ADMIN — MEMANTINE HYDROCHLORIDE 28 MG: 28 CAPSULE, EXTENDED RELEASE ORAL at 09:11

## 2018-03-22 RX ADMIN — DONEPEZIL HYDROCHLORIDE 5 MG: 5 TABLET, FILM COATED ORAL at 21:54

## 2018-03-22 NOTE — PROGRESS NOTES
03/21/18 1013   Quick Adds   Type of Visit Initial PT Evaluation   Living Environment   Lives With spouse  (Primary details provided by patient's , Dl.)   Living Arrangements house  (2.5 story)   Home Accessibility stairs to enter home   Number of Stairs to Enter Home 2  (separate)   Number of Stairs Within Home (full flights between floors.)   Stair Railings at Home inside, present on left side   Transportation Available car;family or friend will provide   Self-Care   Dominant Hand right   Usual Activity Tolerance fair   Current Activity Tolerance fair   Regular Exercise no   Equipment Currently Used at Home none   Functional Level Prior   Ambulation 0-->independent   Transferring 0-->independent   Toileting 2-->assistive person   Bathing 2-->assistive person   Dressing 2-->assistive person   Eating 2-->assistive person   Communication 2-->difficulty understanding and speaking (not related to language barrier)   Swallowing 0-->swallows foods/liquids without difficulty   Cognition 2 - difficulty with organizing thoughts   Fall history within last six months yes   Number of times patient has fallen within last six months 3   Which of the above functional risks had a recent onset or change? fall history   General Information   Onset of Illness/Injury or Date of Surgery - Date 03/20/18   Referring Physician Cayetano Ellington   Patient/Family Goals Statement  states intension to bring patient home as soon as is appropriate.   Pertinent History of Current Problem (include personal factors and/or comorbidities that impact the POC) 77-year-old woman with advanced dementia and osteoporosis, chronic right hip pain, was brought to the ER for evaluation due to recent fall and unable to ambulate. Closed fracture through the R femoral neck with impaction, likely sustained due to a fall 3 days ago.  No loss of consciousness. Patient is s/p R bipolar hip hemiarthroplasty on 3/20.    Precautions/Limitations fall  precautions  (No dislocation precautions due to surgical approach.)   Weight-Bearing Status - RLE weight-bearing as tolerated   General Observations Patient quite somnolent throughout session with exception of momentary alertness during which she only engages with her  with smiles.   Cognitive Status Examination   Orientation other (see comments)  (Patient quite somnolent throughout session with exception of)   Level of Consciousness lethargic/somnolent   Follows Commands and Answers Questions unable to follow commands;unable to follow multi-step instructions;unable to answer questions   Personal Safety and Judgment impaired   Memory impaired   Pain Assessment   Patient Currently in Pain Yes, see Vital Sign flowsheet  (Patient winces with gentle ROM of R L/E.)   Integumentary/Edema   Integumentary/Edema Comments Surgical site covered by postop dressing.   Posture    Posture Comments Unable to assess due to somnolence.   Range of Motion (ROM)   ROM Comment R L/E ROM limited by appearent hip pain.   Strength   Strength Comments Patient noted to be moving her L L/E and both U/Es spntaneously when momentarily aroused.   Bed Mobility   Bed Mobility Bed mobility analysis   Bed Mobility Comments Unable to assess due to somnolence.   Bed Mobility Analysis   Bed Mobility Limitations cognitive deficits   Impairments Contributing to Impaired Bed Mobility decreased flexibility;pain;decreased strength   Transfer Skills   Transfer Comments Unable to assess due to somnolence.   Gait   Gait Comments Unable to assess due to somnolence.   Balance   Balance Comments Unable to assess due to somnolence.   Sensory Examination   Sensory Perception Comments Unable to assess due to somnolence.   Coordination   Coordination Comments Unable to assess due to somnolence.   Muscle Tone   Muscle Tone Comments Unable to assess due to somnolence.   General Therapy Interventions   Planned Therapy Interventions bed mobility training;gait  "training;ROM;strengthening;transfer training;home program guidelines;progressive activity/exercise   Clinical Impression   Criteria for Skilled Therapeutic Intervention yes, treatment indicated   PT Diagnosis Impaired mobility and gait.   Influenced by the following impairments Pain, edema, weakness, and dementia.   Functional limitations due to impairments Limited mobility and gait.   Clinical Presentation Evolving/Changing   Clinical Presentation Rationale Functional assessment and clinical judgement.   Clinical Decision Making (Complexity) Moderate complexity   Therapy Frequency` daily   Predicted Duration of Therapy Intervention (days/wks) 3 days   Anticipated Equipment Needs at Discharge front wheeled walker;wheelchair   Anticipated Discharge Disposition Transitional Care Facility;Home with Assist;Home with Home Therapy   Risk & Benefits of therapy have been explained Yes   Patient, Family & other staff in agreement with plan of care Yes   [facility] [employer] TM \"6 Clicks\"   2016, Trustees of [facility], under license to [employer].  All rights reserved.   6 Clicks Short Forms Basic Mobility Inpatient Short Form   [facility] AM-PAC  \"6 Clicks\" V.2 Basic Mobility Inpatient Short Form   1. Turning from your back to your side while in a flat bed without using bedrails? 1 - Total   2. Moving from lying on your back to sitting on the side of a flat bed without using bedrails? 1 - Total   3. Moving to and from a bed to a chair (including a wheelchair)? 1 - Total   4. Standing up from a chair using your arms (e.g., wheelchair, or bedside chair)? 1 - Total   5. To walk in hospital room? 1 - Total   6. Climbing 3-5 steps with a railing? 1 - Total   Basic Mobility Raw Score (Score out of 24.Lower scores equate to lower levels of function) 6   Total Evaluation Time   Total Evaluation Time (Minutes) 15     "

## 2018-03-22 NOTE — PLAN OF CARE
Problem: Patient Care Overview  Goal: Plan of Care/Patient Progress Review  PT: Orders received, chart reviewed, Eval completed, and treatment started, s/p R hip Bipolar hemiarthroplasty on 3/20/18.  Discharge Planner PT   Patient plan for discharge: Not stated.  Patient's  states goal to get her home as soon as he is able.  Current status: PT: Patient currently very somnolent and unable to follow instructions.  Barriers to return to prior living situation: Postop pain, edema, weakness, and severe dementia.  Recommendations for discharge: TCU vs home with  services and round the clock supervision.  Rationale for recommendations: Patient witll continue to need skilled PT for recovery of functional mobility and safety for negotiation of chosen residence.       Entered by: Oscar Bobby 03/21/2018 8:06 PM

## 2018-03-22 NOTE — PLAN OF CARE
Problem: Patient Care Overview  Goal: Plan of Care/Patient Progress Review  Outcome: No Change  VSS. Disoriented x4. Lethargic over this shift. No visible signs of pain. Urine output 675mL over shift.

## 2018-03-22 NOTE — PROGRESS NOTES
D: [facility] can accept patient. According to their admissions, patient's [employer] is primary (perhaps through her 's employer?) so they may need prior auth. They are checking.

## 2018-03-22 NOTE — PLAN OF CARE
Problem: Fractured Hip (Adult)  Goal: Signs and Symptoms of Listed Potential Problems Will be Absent, Minimized or Managed (Fractured Hip)  Signs and symptoms of listed potential problems will be absent, minimized or managed by discharge/transition of care (reference Fractured Hip (Adult) CPG).   Outcome: No Change  Lethargic. GABBY orientation, pt nonverbal aside from yelling out upon being repositioned by staff. A2 bed mobility. Regular diet, appetite poor. James catheter removed at 1300 today, has since been incontinent of a large amount of urine. GABBY pain. Refused most recent scheduled tylenol dose. Takes pills crushed in applesauce/pudding. Seroquel given x1 for agitation/restlessness. IV SL. Mitts in place to avoid pt pulling on IV. Dressing changed, CDI. Discharge likely to TCU. Continue to monitor.

## 2018-03-22 NOTE — PROGRESS NOTES
Orthopedic Surgery  Adrianeale Flores  3/22/2018  Admit Date:  3/19/2018  POD # 2  S/P Right femur intertrochanteric fracture open reduction and internal fixation     Patient resting comfortably in bed, PT present to start in bed exercises. Patient is very sleepy    Pain controlled.  Tolerating oral intake.    Denies nausea or vomiting  Denies chest pain or shortness of breath  No events overnight.      Alert and orient to person only  Vital Sign Ranges  Temperature Temp  Av.7  F (37.1  C)  Min: 98.2  F (36.8  C)  Max: 99.5  F (37.5  C)   Blood pressure Systolic (24hrs), Av , Min:123 , Max:142        Diastolic (24hrs), Av, Min:64, Max:71      Pulse No Data Recorded   Respirations Resp  Avg: 15.7  Min: 14  Max: 16   Pulse oximetry SpO2  Av %  Min: 94 %  Max: 99 %       Dressing is clean, dry, and intact.   Minimal erythema of the surrounding skin.   Bilateral calves are soft, non-tender.  Bilateral lower extremity is NVI.  Sensation intact bilateral lower extremities  5/5 motor with resisted dorsi and plantar flexion bilaterally    Labs:  Recent Labs   Lab Test  18   0630  18   0701  18   2156   POTASSIUM  3.8  4.2  4.4     Recent Labs   Lab Test  18   0630  18   0701  18   2156   HGB  10.9*  11.6*  12.5     Recent Labs   Lab Test  18   2156   INR  0.92     Recent Labs   Lab Test  18   2110  18   2156  17   1145   PLT  237  250  242       A/P  1. Plan                        Continue Lovenox for DVT prophylaxis.                          Mobilize with PT/OT                         WBAT with assistive device.                          Continue current pain regiment. Try to limit narcotics due to sleepiness/aggitation/alzheimers                        Leave dressing intact     2. Disposition                        Anticipate d/c to home with home care or TCU pending progress    Sophia Carpio PA-C

## 2018-03-22 NOTE — PROGRESS NOTES
[facility]    Hospitalist Progress Note    Assessment & Plan   Adriane Flores is a 77 year old female with PMHx of advanced dementia and osteoporosis with chronic right hip pain who was admitted on 3/19/2018 after a fall with inability to ambulate. She was found to have a right femoral neck fracture with impaction.     R Femoral Neck Fracture with impaction s/p R hip hemiarthroplasty on 3/20/18: POD #2  Likely occurred after a fall sustained 3d prior to presentation. No LOC with fall. Ortho consulted and she underwent a R bipolar hip hemiarthroplasty per Dr. Ellington on 3/20/18.   -- routine postop cares per ortho including pain control and DVT ppx  -- minimize narcotics as able given underlying dementia and predisposition to delirium -- using sched Tylenol (325mg TID), prn Tramadol and prn Tylenol  -- PT/OT following, anticipate TCU stay will be needed     Advanced Alzheimer's Dementia  Resides with her  at home. Has a caregiver 6d/wk (exept for Sundays from 8-5).   -- conts on PTA meds including Namenda, Aricept, Wellbutrin and prn Ativan  -- minimizing narcotic use  -- attempt to maintain sleep/wake cycle, has prn melatonin available  -- has prn Seroquel available for agitation or insomnia        Pain Assessment:  Current Pain Score 3/21/2018 3/21/2018 3/21/2018   Patient currently in pain? sleeping: patient not able to self report yes sleeping: patient not able to self report   Pain score (0-10) - 0 -   Pain location - Hip -   - Adriane is unable to participate in a collaborative plan for pain management due to hip fracture and surgery.   - Please see the plan for pain management as documented above      FEN: urine output improved -- can dc IVFs, lytes stable, regular diet ordered  DVT Prophylaxis: PCDs and Lovenox per ortho  Code Status: Full Code, as discussed with  on admission    Disposition: Anticipate discharge in the next 1-2d, pending ortho recommendations. Await  therapy recs for discharge plan, though would assume she would benefit from TCU stay.    Miguelina Helton    Interval History   Per RN was more fidgity this morning -- pulling at tubes/lines and difficult to redirect. Given dose of Seroquel. Otherwise doing okay. Pain appears controlled. Making urine. Patient pleasantly confused when I saw her, soft mitts on, NAD.     -Data reviewed today: I reviewed all new labs and imaging results over the last 24 hours. I personally reviewed no images or EKG's today.    Physical Exam   Temp: 98.2  F (36.8  C) Temp src: Axillary BP: 135/65   Heart Rate: 76 Resp: 16 SpO2: 95 % O2 Device: None (Room air)    Vitals:    03/19/18 1933 03/22/18 0702   Weight: 49.9 kg (110 lb) 50.3 kg (111 lb)     Vital Signs with Ranges  Temp:  [98.2  F (36.8  C)-99.5  F (37.5  C)] 98.2  F (36.8  C)  Heart Rate:  [64-95] 76  Resp:  [14-16] 16  BP: (123-142)/(64-71) 135/65  SpO2:  [94 %-99 %] 95 %  I/O last 3 completed shifts:  In: 1768.75 [I.V.:1768.75]  Out: 1275 [Urine:1275]    Constitutional: Resting comfortably, pleasantly confused -- unable to tell me her name/location/year, NAD  Respiratory: CTAB, no wheeze/rales/rhonchi, no increased work of breathing  Cardiovascular: HRRR, no MGR, no LE edema  GI: S, NT, ND, +BS  Skin/Integumen: warm/dry  Other:      Medications     sodium chloride 75 mL/hr at 03/21/18 1345       melatonin  1 mg Oral At Bedtime     buPROPion  150 mg Oral Daily     donepezil  5 mg Oral At Bedtime     memantine XR  28 mg Oral Daily     sodium chloride (PF)  3 mL Intracatheter Q8H     enoxaparin  40 mg Subcutaneous Q24H     acetaminophen  325 mg Oral Q8H       Data     Recent Labs  Lab 03/22/18  0630 03/21/18  0701 03/20/18  2110 03/19/18  2156   WBC  --   --   --  9.8   HGB 10.9* 11.6*  --  12.5   MCV  --   --   --  92   PLT  --   --  237 250   INR  --   --   --  0.92    138  --  135   POTASSIUM 3.8 4.2  --  4.4   CHLORIDE 109 106  --  100   CO2 25 26  --  26   BUN 12  16  --  31*   CR 0.72 0.74 0.74 0.73   ANIONGAP 7 6  --  9   AWILDA 8.0* 7.9*  --  8.1*   GLC 99 92  --  102*       No results found for this or any previous visit (from the past 24 hour(s)).

## 2018-03-22 NOTE — PLAN OF CARE
Problem: Patient Care Overview  Goal: Plan of Care/Patient Progress Review  Discharge Planner OT   Patient plan for discharge: TCU- per interdisciplinary documentation  Current status: Pt very limited with hx of dementia and somnolence.   Barriers to return to prior living situation: Current level of function, severe dementia   Recommendations for discharge: OT to hold eval and defer to TCU.   Rationale for recommendations: Pt to d/c to TCU, has A with ADLs from spouse at baseline due to severe dementia. OT to complete orders at this time.        Entered by: Brenda Elliott 03/22/2018 4:13 PM

## 2018-03-22 NOTE — PROGRESS NOTES
Care Transition Initial Assessment - FRANSISCO  Reason For Consult: discharge planning  Met with: Family ( Dl)  Active Problems:    Hip fracture, right, closed, initial encounter (H)    Hip fracture requiring operative repair (H)       DATA  Lives With: spouse  Living Arrangements: house     Who is your support system?: , PCA  Identified issues/concerns regarding health management: SW was consulted for discharge planning. Patient is Adriane, a 77 year-old female who was admitted on 3/19 for a hip fracture. Her tentative discharge date is 3/23. SW spoke to her , Dl, since patient is confused, and reviewed medical record. Per PT eval, prior to this admission, patient lived with her  in a house with 2 stairs to enter and multiple flights of stairs within. She was independent with ambulating and transferring, but required assistance for toileting, bathing, dressing, and eating. She has PCA services during the day. PT recommended home with 24-hour assist and HHC, or TCU. Junaid's  preferred TCU; he is requesting [facility]. SW placed this referral via DOD. He believes he can transport her at discharge.     Quality Of Family Relationships: supportive, helpful, involved  Transportation Available: car, family or friend will provide  ASSESSMENT  Cognitive Status:  Did not meet with patient due to confusion  Concerns to be addressed: Discharge planning.   PLAN  Financial costs for the patient includes N/A.  Patient given options and choices for discharge TCu choices.  Patient/family is agreeable to the plan?  Yes  Patient Goals and Preferences: Discharge to [facility].  Patient anticipates discharging to:  TCU.    Miranda Cartwright, VIGNESH, LICSW

## 2018-03-23 ENCOUNTER — APPOINTMENT (OUTPATIENT)
Dept: PHYSICAL THERAPY | Facility: CLINIC | Age: 78
DRG: 470 | End: 2018-03-23
Payer: COMMERCIAL

## 2018-03-23 LAB — PLATELET # BLD AUTO: 264 10E9/L (ref 150–450)

## 2018-03-23 PROCEDURE — 97530 THERAPEUTIC ACTIVITIES: CPT | Mod: GP | Performed by: PHYSICAL THERAPIST

## 2018-03-23 PROCEDURE — 12000007 ZZH R&B INTERMEDIATE

## 2018-03-23 PROCEDURE — A9270 NON-COVERED ITEM OR SERVICE: HCPCS | Mod: GY | Performed by: INTERNAL MEDICINE

## 2018-03-23 PROCEDURE — A9270 NON-COVERED ITEM OR SERVICE: HCPCS | Mod: GY | Performed by: ORTHOPAEDIC SURGERY

## 2018-03-23 PROCEDURE — 97110 THERAPEUTIC EXERCISES: CPT | Mod: GP | Performed by: PHYSICAL THERAPIST

## 2018-03-23 PROCEDURE — 25000132 ZZH RX MED GY IP 250 OP 250 PS 637: Mod: GY | Performed by: ORTHOPAEDIC SURGERY

## 2018-03-23 PROCEDURE — 99232 SBSQ HOSP IP/OBS MODERATE 35: CPT | Performed by: INTERNAL MEDICINE

## 2018-03-23 PROCEDURE — 40000193 ZZH STATISTIC PT WARD VISIT: Performed by: PHYSICAL THERAPIST

## 2018-03-23 PROCEDURE — 36415 COLL VENOUS BLD VENIPUNCTURE: CPT | Performed by: ORTHOPAEDIC SURGERY

## 2018-03-23 PROCEDURE — 25000132 ZZH RX MED GY IP 250 OP 250 PS 637: Mod: GY | Performed by: INTERNAL MEDICINE

## 2018-03-23 PROCEDURE — 85049 AUTOMATED PLATELET COUNT: CPT | Performed by: ORTHOPAEDIC SURGERY

## 2018-03-23 PROCEDURE — 25000128 H RX IP 250 OP 636: Performed by: ORTHOPAEDIC SURGERY

## 2018-03-23 RX ORDER — QUETIAPINE FUMARATE 25 MG/1
12.5 TABLET, FILM COATED ORAL 2 TIMES DAILY PRN
Qty: 60 TABLET | DISCHARGE
Start: 2018-03-23

## 2018-03-23 RX ADMIN — MEMANTINE HYDROCHLORIDE 28 MG: 28 CAPSULE, EXTENDED RELEASE ORAL at 09:07

## 2018-03-23 RX ADMIN — ACETAMINOPHEN 325 MG: 325 TABLET, FILM COATED ORAL at 01:33

## 2018-03-23 RX ADMIN — DONEPEZIL HYDROCHLORIDE 5 MG: 5 TABLET, FILM COATED ORAL at 21:36

## 2018-03-23 RX ADMIN — ACETAMINOPHEN 325 MG: 325 TABLET, FILM COATED ORAL at 18:39

## 2018-03-23 RX ADMIN — ACETAMINOPHEN 325 MG: 325 TABLET, FILM COATED ORAL at 09:07

## 2018-03-23 RX ADMIN — Medication 1 MG: at 21:36

## 2018-03-23 RX ADMIN — BUPROPION HYDROCHLORIDE 150 MG: 150 TABLET, FILM COATED, EXTENDED RELEASE ORAL at 09:07

## 2018-03-23 RX ADMIN — ENOXAPARIN SODIUM 40 MG: 40 INJECTION SUBCUTANEOUS at 13:47

## 2018-03-23 NOTE — PROGRESS NOTES
[facility]    Hospitalist Progress Note    Assessment & Plan   Adriane Flores is a 77 year old female with PMHx of advanced dementia and osteoporosis with chronic right hip pain who was admitted on 3/19/2018 after a fall with inability to ambulate. She was found to have a right femoral neck fracture with impaction.     R Femoral Neck Fracture with impaction s/p R hip hemiarthroplasty on 3/20/18: POD #3  Likely occurred after a fall sustained 3d prior to presentation. No LOC with fall. Ortho consulted and she underwent a R bipolar hip hemiarthroplasty per Dr. Ellington on 3/20/18.   -- routine postop cares per ortho including pain control and DVT ppx  -- minimize narcotics given underlying dementia and predisposition to delirium -- presently using sched Tylenol (325mg TID), prn Tramadol and prn Tylenol (neither of which have been needed since POD #1)  -- PT/OT following, TCU stay will be needed     Advanced Alzheimer's Dementia  Resides with her  at home. Has a caregiver 6d/wk (exept for Sundays from 8-5).   -- conts on PTA meds including Namenda, Aricept, Wellbutrin and prn Ativan  -- minimizing narcotic use (hasn't had any in the postop period)  -- attempt to maintain sleep/wake cycle, has prn melatonin available  -- has prn Seroquel available for agitation or insomnia -- none needed since 3/22 AM        Pain Assessment:  Current Pain Score 3/21/2018 3/21/2018 3/21/2018   Patient currently in pain? sleeping: patient not able to self report yes sleeping: patient not able to self report   Pain score (0-10) - 0 -   Pain location - Hip -   - Adriane is unable to participate in a collaborative plan for pain management due to hip fracture and surgery.   - Please see the plan for pain management as documented above      FEN: off IVFs, lytes stable, regular diet   DVT Prophylaxis: PCDs and Lovenox per ortho  Code Status: Full Code, as discussed with  on admission    Disposition: Anticipate  discharge to TCU pending ortho recommendations -- ?later today vs tomorrow.    Miguelina Church White    Interval History    Remains confused but hasn't required dose of Seroquel since yesterday morning. Mitts removed last evening. Seen this morning. Pleasantly confused and cannot answer questions but otherwise looks well. No concerns per RN -- will plan to remove IV today so that she doesn't pull at it.     -Data reviewed today: I reviewed all new labs and imaging results over the last 24 hours. I personally reviewed no images or EKG's today.    Physical Exam   Temp: 99.3  F (37.4  C) Temp src: Oral BP: 140/78 Pulse: 79 Heart Rate: 92 Resp: 16 SpO2: 96 % O2 Device: None (Room air)    Vitals:    03/19/18 1933 03/22/18 0702   Weight: 49.9 kg (110 lb) 50.3 kg (111 lb)     Vital Signs with Ranges  Temp:  [97.7  F (36.5  C)-99.3  F (37.4  C)] 99.3  F (37.4  C)  Pulse:  [79] 79  Heart Rate:  [56-92] 92  Resp:  [16] 16  BP: (131-140)/(65-78) 140/78  SpO2:  [95 %-97 %] 96 %  I/O last 3 completed shifts:  In: 385 [P.O.:385]  Out: 860 [Urine:860]    Constitutional: Resting comfortably, pleasantly confused -- unable to tell me her name/location/year, NAD  Respiratory: CTAB, no wheeze/rales/rhonchi, no increased work of breathing  Cardiovascular: HRRR, no MGR, no LE edema  GI: S, NT, ND, +BS  Skin/Integumen: warm/dry  Other:      Medications       melatonin  1 mg Oral At Bedtime     buPROPion  150 mg Oral Daily     donepezil  5 mg Oral At Bedtime     memantine XR  28 mg Oral Daily     sodium chloride (PF)  3 mL Intracatheter Q8H     enoxaparin  40 mg Subcutaneous Q24H     acetaminophen  325 mg Oral Q8H       Data     Recent Labs  Lab 03/23/18  0554 03/22/18  0630 03/21/18  0701 03/20/18  2110 03/19/18  2156   WBC  --   --   --   --  9.8   HGB  --  10.9* 11.6*  --  12.5   MCV  --   --   --   --  92     --   --  237 250   INR  --   --   --   --  0.92   NA  --  141 138  --  135   POTASSIUM  --  3.8 4.2  --  4.4   CHLORIDE   --  109 106  --  100   CO2  --  25 26  --  26   BUN  --  12 16  --  31*   CR  --  0.72 0.74 0.74 0.73   ANIONGAP  --  7 6  --  9   AWILDA  --  8.0* 7.9*  --  8.1*   GLC  --  99 92  --  102*       No results found for this or any previous visit (from the past 24 hour(s)).

## 2018-03-23 NOTE — PROGRESS NOTES
[facility]  D:  Per rounds update, pt could be ready to d/c to TCU today.  CC has been in contact with [facility] and they are contacting pt's insurance for a TCU prior authorization.  P:  [facility] following pt for TCU placement.      Update at 1400:  Per Karen from [facility] she is still waiting on prior auth from pt's [employer] insurance.  She will attempt to contact them again.

## 2018-03-23 NOTE — PROGRESS NOTES
Orthopedic Surgery  Adrianeale Flores  3/23/2018  Admit Date:  3/19/2018  POD # 3  S/P Right femur intertrochanteric fracture open reduction and internal fixation      Patient resting comfortably in bed  Pain controlled.  Tolerating oral intake.    Denies nausea or vomiting  Denies chest pain or shortness of breath  No events overnight.       Alert and orient to person only  Vital Sign Ranges  Temperature Temp  Av.4  F (36.9  C)  Min: 97.7  F (36.5  C)  Max: 99.3  F (37.4  C)   Blood pressure Systolic (24hrs), Av , Min:131 , Max:166        Diastolic (24hrs), Av, Min:65, Max:78      Pulse Pulse  Av  Min: 79  Max: 79   Respirations Resp  Av  Min: 16  Max: 16   Pulse oximetry SpO2  Av.6 %  Min: 95 %  Max: 97 %       Dressing is clean, dry, and intact.   Minimal erythema of the surrounding skin.   Bilateral calves are soft, non-tender.  Bilateral lower extremity is NVI.  Sensation intact bilateral lower extremities  5/5 motor with resisted dorsi and plantar flexion bilaterally    Labs:  Recent Labs   Lab Test  18   0630  18   0701  18   2156   POTASSIUM  3.8  4.2  4.4     Recent Labs   Lab Test  18   0630  18   0701  18   2156   HGB  10.9*  11.6*  12.5     Recent Labs   Lab Test  18   2156   INR  0.92     Recent Labs   Lab Test  18   0554  18   2110  18   2156   PLT  264  237  250       A/P  1. Plan                        Continue Lovenox for DVT prophylaxis.                          Mobilize with PT/OT                         WBAT with assistive device.                          Continue current pain regiment. Try to limit narcotics due to sleepiness/aggitation/alzheimers                        Leave dressing intact      2. Disposition                        Anticipate d/c to TCU pending placement    Sophia Carpio PA-C

## 2018-03-23 NOTE — PLAN OF CARE
Problem: Patient Care Overview  Goal: Plan of Care/Patient Progress Review  Outcome: No Change  Pt slept most of shift. Unable to assess orientation, nausea, numbness/tingling, pain. Pt does not respond to any questions appropriately. Does moan in pain when turned. Using ice to hip and taking scheduled tylenol. IV SL. Blanchable redness to buttocks. Pedal pulses palpable. Takes meds crushed in applesauce, tolerating well. Regular diet but poor appetite. Encouraging PO intake.

## 2018-03-23 NOTE — PLAN OF CARE
Problem: Patient Care Overview  Goal: Plan of Care/Patient Progress Review  Discharge Planner PT   Patient plan for discharge: TCU.  Current status: PT: Patient remains somnolent and unable to actively participate.  However, patient given gentle ROM to both LEs and repositioned in bed to promote relaxation and prevent contractures.  Barriers to return to prior living situation: Postop pain, edema, weakness, and level of care currently required.  Recommendations for discharge: TCU.  Rationale for recommendations: While patient recovers from her hip surgery, she will continue to need skilled PT for recovery of functional mobility and safety for negotiation of chosen residence.       Entered by: Oscar Bobby 03/22/2018 10:37 PM

## 2018-03-23 NOTE — PROGRESS NOTES
PAS-RR    D: Per DHS regulation, SW completed and submitted PAS-RR to [place] [employer] Direct Connect via the Senior [employer] Line.  PAS-RR confirmation # is :[id number]    I: SW spoke with pt and they are aware a PAS-RR has been submitted.  SW reviewed with pt that they may be contacted for a follow up appointment within 10 days of hospital discharge if their SNF stay is < 30 days.  Contact information for Senior [employer]Age Line was also provided.    A: pt verbalized understanding.    P: Further questions may be directed to Senior [employer]Age Line at #[phone], option #4 for PAS-RR staff.

## 2018-03-23 NOTE — PLAN OF CARE
Problem: Patient Care Overview  Goal: Plan of Care/Patient Progress Review  Outcome: Improving  Disoriented x4. Calm. GABBY pain, CMS, as patient is unable to answer questions. No non-verbal pain cues observed. Takes medications crushed in applesauce. Incontinent of bladder and poor PO intake. Dressing CDI. D/C to [facility] pending authorization.

## 2018-03-23 NOTE — PLAN OF CARE
Problem: Patient Care Overview  Goal: Plan of Care/Patient Progress Review  Outcome: No Change  [id number]  Kishor other VSS on RA. Patient confused, GABBY orientation, patient nonverbal baseline aside from an occasional yes, or yelling with movement. Mitts in place. Dressing CDI. GABBY all CMS, color good, pulses good.  Regular diet, poor appetite, attempting to give boost as supplement. Medications crushed given in apple sauce. IV : SL Up with 2. Incontinent bladder on shift Weight shifting provided. Will continue to monitor

## 2018-03-24 ENCOUNTER — APPOINTMENT (OUTPATIENT)
Dept: PHYSICAL THERAPY | Facility: CLINIC | Age: 78
DRG: 470 | End: 2018-03-24
Payer: COMMERCIAL

## 2018-03-24 VITALS
SYSTOLIC BLOOD PRESSURE: 121 MMHG | HEIGHT: 62 IN | BODY MASS INDEX: 20.43 KG/M2 | WEIGHT: 111 LBS | OXYGEN SATURATION: 96 % | RESPIRATION RATE: 16 BRPM | TEMPERATURE: 97.7 F | HEART RATE: 65 BPM | DIASTOLIC BLOOD PRESSURE: 61 MMHG

## 2018-03-24 LAB
CREAT SERPL-MCNC: 0.57 MG/DL (ref 0.52–1.04)
GFR SERPL CREATININE-BSD FRML MDRD: >90 ML/MIN/1.7M2

## 2018-03-24 PROCEDURE — 36415 COLL VENOUS BLD VENIPUNCTURE: CPT | Performed by: ORTHOPAEDIC SURGERY

## 2018-03-24 PROCEDURE — 97110 THERAPEUTIC EXERCISES: CPT | Mod: GP

## 2018-03-24 PROCEDURE — 25000132 ZZH RX MED GY IP 250 OP 250 PS 637: Mod: GY | Performed by: INTERNAL MEDICINE

## 2018-03-24 PROCEDURE — 40000193 ZZH STATISTIC PT WARD VISIT

## 2018-03-24 PROCEDURE — 97530 THERAPEUTIC ACTIVITIES: CPT | Mod: GP

## 2018-03-24 PROCEDURE — A9270 NON-COVERED ITEM OR SERVICE: HCPCS | Mod: GY | Performed by: INTERNAL MEDICINE

## 2018-03-24 PROCEDURE — 82565 ASSAY OF CREATININE: CPT | Performed by: ORTHOPAEDIC SURGERY

## 2018-03-24 PROCEDURE — A9270 NON-COVERED ITEM OR SERVICE: HCPCS | Mod: GY | Performed by: ORTHOPAEDIC SURGERY

## 2018-03-24 PROCEDURE — 99239 HOSP IP/OBS DSCHRG MGMT >30: CPT | Performed by: INTERNAL MEDICINE

## 2018-03-24 PROCEDURE — 25000128 H RX IP 250 OP 636: Performed by: ORTHOPAEDIC SURGERY

## 2018-03-24 PROCEDURE — 25000132 ZZH RX MED GY IP 250 OP 250 PS 637: Mod: GY | Performed by: ORTHOPAEDIC SURGERY

## 2018-03-24 RX ORDER — BUPROPION HYDROCHLORIDE 75 MG/1
75 TABLET ORAL 2 TIMES DAILY
Qty: 90 TABLET | DISCHARGE
Start: 2018-03-24 | End: 2018-04-05

## 2018-03-24 RX ORDER — MEMANTINE HYDROCHLORIDE 28 MG/1
28 CAPSULE, EXTENDED RELEASE ORAL DAILY
DISCHARGE
Start: 2018-03-24

## 2018-03-24 RX ORDER — BUPROPION HYDROCHLORIDE 75 MG/1
75 TABLET ORAL 2 TIMES DAILY
Status: DISCONTINUED | OUTPATIENT
Start: 2018-03-24 | End: 2018-03-24 | Stop reason: HOSPADM

## 2018-03-24 RX ADMIN — MEMANTINE HYDROCHLORIDE 28 MG: 28 CAPSULE, EXTENDED RELEASE ORAL at 10:52

## 2018-03-24 RX ADMIN — BUPROPION HYDROCHLORIDE 75 MG: 75 TABLET, FILM COATED ORAL at 13:37

## 2018-03-24 RX ADMIN — ENOXAPARIN SODIUM 40 MG: 40 INJECTION SUBCUTANEOUS at 13:37

## 2018-03-24 NOTE — PHARMACY-CONSULT NOTE
Pharmacotherapy Consult    Asked to change extended-release meds to immediate release to allow for crushing.    Changed wellbutrin 150 mg XL po daily to 75 mg IR po bid  No need to change namenda XR, as the capsule can be opened and pellets sprinkled on applesauce.  No other changes necessary.

## 2018-03-24 NOTE — PROGRESS NOTES
Notified by RN that patient had episode of emesis and lethargy this afternoon and she had been up in bedside chair with therapy. Has essentially been in bed through duration of stay, today was her first time up to chair. Has had minimal po intake this stay. Suspect episode secondary to possible vasovagal changes with getting out of bed and sitting upright in the context of decreased po intake. No focal neuro deficts on exam. Hemodynamically stable at presemt.  When I saw her she was resting quietly, didn't rouse to name but appeared comfortable.     On subsequent evaluations per RN, her mentation has improved and she is now more responsive. Don't think we need to pursue further workup at this time. Should be stable to discharge to TCU this afternoon as scheduled.     Miguelina Helton DO  Internal Medicine - Hospitalist  3/24/2018  1:42 PM

## 2018-03-24 NOTE — PLAN OF CARE
Problem: PT General Care Plan  Goal: PT target date for goal attainment  Discharge Planner PT   Patient plan for discharge: Not stated by patient   Current status: Patient had difficulty following cues for exercises. Bed mobility with Mod Assist x 2, Sit <> stand x 2 with Bilateral hand hold assist and Min Assist x 2 with gait belt. Transferred bed to chair with stand pivot transfer Min Assist x 2. Patient in chair at end of session. Nurse notified.   Barriers to return to prior living situation: level of assist for all functional independence  Recommendations for discharge: TCU per plan established by Physical Therapist  Rationale for recommendations: See above (barriers)       Entered by: Dotty Yoder 03/24/2018 11:34 AM       Patient discharged to TCU. PT goals not met

## 2018-03-24 NOTE — PLAN OF CARE
Problem: Surgery Nonspecified (Adult)  Goal: Signs and Symptoms of Listed Potential Problems Will be Absent, Minimized or Managed (Surgery Nonspecified)  Signs and symptoms of listed potential problems will be absent, minimized or managed by discharge/transition of care (reference Surgery Nonspecified (Adult) CPG).   Outcome: Improving  Patient remains confused, dressing intact. Vitals are stable, room air, little appetite, had one heavy saturated  Diaper and bm x1.

## 2018-03-24 NOTE — PLAN OF CARE
Problem: Surgery Nonspecified (Adult)  Goal: Signs and Symptoms of Listed Potential Problems Will be Absent, Minimized or Managed (Surgery Nonspecified)  Signs and symptoms of listed potential problems will be absent, minimized or managed by discharge/transition of care (reference Surgery Nonspecified (Adult) CPG).  Outcome: No Change  Disoriented x4. Cooperative with cares. POD 3. Dressing C/D/I. Turned and repositioned q2h. Incontinent of B&B. Void x2. Appetite poor, total assist with feeding. Fluids encouraged, small sips taken. Medications given crushed in pudding. Anticipating DC to TCU following authorization.

## 2018-03-24 NOTE — PLAN OF CARE
Problem: Surgery Nonspecified (Adult)  Goal: Signs and Symptoms of Listed Potential Problems Will be Absent, Minimized or Managed (Surgery Nonspecified)  Signs and symptoms of listed potential problems will be absent, minimized or managed by discharge/transition of care (reference Surgery Nonspecified (Adult) CPG).   Outcome: No Change  A&O to self only. CMS intact. Drg CDI. Pt's pain minimal, tolerating T&R. A-2 to pivot to chair. Eating and drinking small amounts, and voiding adequately in brief, inc B&B. VSS on RA. Pt returned to baseline after losing consciousness with sitting in chair, MD aware of patient status. Pt to DC to TCU

## 2018-03-24 NOTE — PROGRESS NOTES
Pt sat up in chair after PT session for 15 minutes (first time for pt to sit up and out of bed for 5 days).  After sitting, pt lost consciousness for a short period and had an emesis. Pt lifted back to bed. Color pale. VSS. More lethargic than previous. MD paged and will come assess pt.

## 2018-03-24 NOTE — PROGRESS NOTES
Spoke with MD and informed her that pt is more alert and awake. Color improved. Responds to voice, opening eyes. MD states pt can still DC as previously planned.

## 2018-03-24 NOTE — PLAN OF CARE
Problem: Patient Care Overview  Goal: Plan of Care/Patient Progress Review  Discharge Planner PT   Patient plan for discharge: Not stated by patient, her  states hope that she with be able to disch to TCU.  Current status: PT: Needs mod-maxA for L/E P-AAROM, and A of 2 for bed mobility and to stand at EOB via bilateral elbow-liftA  For momentary weight shifting prior to being returned to supine.  Barriers to return to prior living situation: Postop pain, edema, and weakness, and level of care she currently requires.  Recommendations for discharge: TCU.  Rationale for recommendations: Patient will continue to need skilled PT for recovery of functional mobility and safety for negotiation of chosen residence.       Entered by: Oscar Bobby 03/23/2018 8:49 PM

## 2018-03-24 NOTE — DISCHARGE SUMMARY
Pt is confused, avs sent in DC packet. sent with DC packet with EMS and all pt belongings. Pt DC'd to [facility] @ 1451

## 2018-03-24 NOTE — PROGRESS NOTES
D: Discharge orders received and faxed to [facility]. Facility has bed availability for today. [facility] due to Alzheimer's impulsivity, and unhealed fracture. PCS form faxed and attached to discharge packet. Patient's  is in agreement with this plan.

## 2018-03-27 ENCOUNTER — NURSING HOME VISIT (OUTPATIENT)
Dept: GERIATRICS | Facility: CLINIC | Age: 78
End: 2018-03-27
Payer: COMMERCIAL

## 2018-03-27 VITALS
HEART RATE: 97 BPM | TEMPERATURE: 98.6 F | OXYGEN SATURATION: 96 % | DIASTOLIC BLOOD PRESSURE: 88 MMHG | RESPIRATION RATE: 16 BRPM | SYSTOLIC BLOOD PRESSURE: 127 MMHG

## 2018-03-27 DIAGNOSIS — G30.9 ALZHEIMER'S DEMENTIA WITHOUT BEHAVIORAL DISTURBANCE, UNSPECIFIED TIMING OF DEMENTIA ONSET: ICD-10-CM

## 2018-03-27 DIAGNOSIS — S72.001D CLOSED FRACTURE OF NECK OF RIGHT FEMUR WITH ROUTINE HEALING: Primary | ICD-10-CM

## 2018-03-27 DIAGNOSIS — F43.23 ADJUSTMENT DISORDER WITH MIXED ANXIETY AND DEPRESSED MOOD: ICD-10-CM

## 2018-03-27 DIAGNOSIS — R53.81 PHYSICAL DECONDITIONING: ICD-10-CM

## 2018-03-27 DIAGNOSIS — E78.5 HYPERLIPIDEMIA LDL GOAL <100: ICD-10-CM

## 2018-03-27 DIAGNOSIS — F02.80 ALZHEIMER'S DEMENTIA WITHOUT BEHAVIORAL DISTURBANCE, UNSPECIFIED TIMING OF DEMENTIA ONSET: ICD-10-CM

## 2018-03-27 DIAGNOSIS — Z96.641 HISTORY OF RIGHT HIP HEMIARTHROPLASTY: ICD-10-CM

## 2018-03-27 PROCEDURE — 99309 SBSQ NF CARE MODERATE MDM 30: CPT | Performed by: NURSE PRACTITIONER

## 2018-03-27 NOTE — PROGRESS NOTES
[facility]    PRIMARY CARE PROVIDER AND CLINIC:  Saul Wheeler [address] [facility] [address]    Chief Complaint   Patient presents with     Hospital F/U       HPI:    Adriane Flores is a 77 year old  (1940),admitted to the [facility] from [facility].  Hospital stay 3/19/2018 through 3/24/2018.  Admitted to this facility for  rehab, medical management and nursing care.  HPI information obtained from: facility chart records, facility staff and patient report.         Hospital Course   Adriane Flores was admitted on 3/19/2018.  The following problems were addressed during her hospitalization:     R Femoral Neck Fracture with impaction s/p R hip hemiarthroplasty on 3/20/18:   Likely occurred after a fall sustained 3d prior to presentation. No LOC with fall. Ortho consulted and she underwent a R bipolar hip hemiarthroplasty per Dr. Ellington on 3/20/18. Postop course was uneventful. Avoided narcotics given predisposition to delirium with her dementia. Her pain was managed with sched Tylenol (325mg TID), though she would intermittently refuse doses. Seen by therapy services postop, difficult time with participation given her advanced dementia. Recommended discharge to TCU. Discharge on Xarelto for DVT ppx. Will need to be monitored closely at TCU while on anticoagulation given her fall risk.      Advanced Alzheimer's Dementia  Prior to admission she had been residing with her  at home. Has a caregiver 6d/wk (exept for Sundays from 8-5).   Kept on PTA meds this stay, including Namenda, Aricept, Wellbutrin and prn Ativan, though some formulations were changed from long acting to short acting as she was requiring her meds to be crushed and given with applesauce.   PRN Seroquel ordered to help manage agitation/insomnia -- had minimal need during postop period  Discharged to memory care TCU for ongoing care.      Discharge Pain  Plan:  - During her hospitalization, Adriane experienced pain due to dementia.  She is unable to participate in a plan for discharge pain management.   - See above -- she will discharge with Tylenol for pain, has not needed Tramadol or narcotics this stay so they will not prescribed at discharge  _________________________________________  Current issues are:  Closed fracture of neck of right femur with routine healing  History of right hip hemiarthroplasty  As noted above  Patient denies pain today  PT/OT eval/tx ordered  Tylenol for pain as noted above - has not requested/noted need for stronger pain control regimen since admission  Xarelto for anticoagulation    Alzheimer's dementia without behavioral disturbance, unspecified timing of dementia onset  Chronic; progressive; continues to require 24-hr supportive cares  Patient very confused at baseline - responds with simple short answers  Does live at home with her  prior to fall  On regimen of Namenda, Aricept, Seroquel and Ativan  Has not had behaviors since admission - has not required use of Ativan or Seroquel    Hyperlipidemia LDL goal <100  Chronic  No active tx regimen  Recent Labs   Lab Test  03/10/17   1033  02/23/16   0917  02/18/15   0837  02/03/14   0954   CHOL  214*  226*  219*  202*   HDL  53  70  68  55   LDL  128*  139*  136*  129   TRIG  166*  84  74  88   CHOLHDLRATIO   --    --   3.2  3.7     Adjustment disorder with mixed anxiety and depressed mood  On Wellbutrin at time of admission - this has been held since her arrival as  notes that she is not depressed and does not require this medication  Patient does not display any active s/sx of current depression - very happy in demeanor and pleasant with conversation, smiling frequently    Physical deconditioning  2/2 hospitalization and above noted diagnoses  PT/OT eval/tx ordered    CODE STATUS/ADVANCE DIRECTIVES DISCUSSION:   CPR/Full code   Patient's living condition: lives with  spouse    ALLERGIES:Sulfa drugs  PAST MEDICAL HISTORY:  has a past medical history of Displacement of intervertebral disc, site unspecified, without myelopathy (1/88); Lateral epicondylitis  of elbow; Other internal derangement of knee(717.89); and Other internal derangement of knee(717.89).  PAST SURGICAL HISTORY:  has a past surgical history that includes NONSPECIFIC PROCEDURE; NONSPECIFIC PROCEDURE (1983); NONSPECIFIC PROCEDURE (1949); photorefractive keratectomy (2000); KNEE SCOPE,DIAGNOSTIC (Left 2000); KNEE SCOPE,DIAGNOSTIC (Right 2001); Colonoscopy (2/8/2013); and Open reduction internal fixation hip bipolar (Right, 3/20/2018).  FAMILY HISTORY: family history includes Alcohol/Drug in her brother and mother; CEREBROVASCULAR DISEASE in her father and maternal grandmother; Cancer - colorectal in her maternal aunt; Hypertension in her maternal grandmother.  SOCIAL HISTORY:  reports that she quit smoking about 47 years ago. She has a 7.50 pack-year smoking history. She has never used smokeless tobacco. She reports that she does not drink alcohol or use illicit drugs.    Post Discharge Medication Reconciliation Status: discharge medications reconciled, continue medications without change.  Current Outpatient Prescriptions   Medication Sig Dispense Refill     memantine XR (NAMENDA XR) 28 MG 24 hr capsule Take 1 capsule (28 mg) by mouth daily       QUEtiapine (SEROQUEL) 25 MG tablet Take 0.5 tablets (12.5 mg) by mouth 2 times daily as needed (give for severe agitation/at danger to self/others during the day, or for persistent insomina, mild agitation at night.) 60 tablet      acetaminophen (TYLENOL) 325 MG tablet Take 2 tablets (650 mg) by mouth every 8 hours 60 tablet 0     LORazepam (ATIVAN) 0.5 MG tablet Take 1 tablet (0.5 mg) by mouth every 8 hours as needed for anxiety 30 tablet 0     rivaroxaban ANTICOAGULANT (XARELTO) 10 MG TABS tablet Take 1 tablet (10 mg) by mouth daily (with dinner) 21 tablet 0      donepezil (ARICEPT) 5 MG tablet Take 1 tablet (5 mg) by mouth At Bedtime 90 tablet 3     buPROPion (WELLBUTRIN) 75 MG tablet Take 1 tablet (75 mg) by mouth 2 times daily (Patient not taking: Reported on 3/27/2018) 90 tablet        ROS:  10 point ROS of systems including Constitutional, Eyes, Respiratory, Cardiovascular, Gastroenterology, Genitourinary, Integumentary, Muscularskeletal, Psychiatric were all negative except for pertinent positives noted in my HPI.    Exam:  /88  Pulse 97  Temp 98.6  F (37  C)  Resp 16  SpO2 96%  GENERAL APPEARANCE:  Alert, in no distress, appears healthy, thin, cooperative, pleasantly confused woman appearing younger than noted age  ENT:  Mouth and posterior oropharynx normal, moist mucous membranes, normal hearing acuity  EYES:  EOM, conjunctivae, lids, pupils and irises normal  NECK:  No adenopathy,masses or thyromegaly  RESP:  respiratory effort and palpation of chest normal, lungs clear to auscultation , no respiratory distress  CV:  Palpation and auscultation of heart done , regular rate and rhythm, no murmur, rub, or gallop, no edema, +2 pedal pulses  ABDOMEN:  normal bowel sounds, soft, nontender, no hepatosplenomegaly or other masses  M/S:   Gait and station abnormal - GABBY 2/2 patient being in w/c  Digits and nails abnormal - arthritic changes present  Examination of:   right lower extremity  Inspection, ROM, stability and muscle strength decreased 2/2 hip fx  SKIN:  Palpation of skin and subcutaneous tissue baseline, Inspection of skin and subcutaneous tissue abnormal, GABBY incision today as patient is up in w/c  NEURO:   Cranial nerves 2-12 are normal tested and grossly at patient's baseline  PSYCH:  oriented to slef, insight and judgement impaired, memory impaired , affect and mood normal     BP Readin/89, 151/73, 122/76            HR:      Lab/Diagnostic data:  CBC RESULTS:   Recent Labs   Lab Test  18   0554  18   0630  18   0701   03/20/18 2110 03/19/18 2156  07/05/17   1145   WBC   --    --    --    --   9.8  6.0   RBC   --    --    --    --   4.10  4.66   HGB   --   10.9*  11.6*   --   12.5  14.5   HCT   --    --    --    --   37.7  43.7   MCV   --    --    --    --   92  94   MCH   --    --    --    --   30.5  31.1   MCHC   --    --    --    --   33.2  33.2   RDW   --    --    --    --   12.5  13.1   PLT  264   --    --   237  250  242       Last Basic Metabolic Panel:  Recent Labs   Lab Test  03/24/18   0659  03/22/18   0630  03/21/18   0701   NA   --   141  138   POTASSIUM   --   3.8  4.2   CHLORIDE   --   109  106   AWILDA   --   8.0*  7.9*   CO2   --   25  26   BUN   --   12  16   CR  0.57  0.72  0.74   GLC   --   99  92       Liver Function Studies -   Recent Labs   Lab Test  07/05/17   1145  03/10/17   1033   PROTTOTAL  7.2  7.1   ALBUMIN  3.9  4.0   BILITOTAL  0.4  0.4   ALKPHOS  69  76   AST  20  16   ALT  19  21       TSH   Date Value Ref Range Status   03/10/2017 2.01 0.40 - 4.00 mU/L Final   02/23/2016 2.77 0.40 - 4.00 mU/L Final         ASSESSMENT/PLAN:   Diagnosis Comments   1. Closed fracture of neck of right femur with routine healing  Ortho intervention as above  F/u per their recommendations  Continue Tylenol for pain control as ordered   2. History of right hip hemiarthroplasty  As above  Xarelto for DVT prophylaxis  Wound care as ordered   3. Alzheimer's dementia without behavioral disturbance, unspecified timing of dementia onset  Pleasantly confused  Continue scheduled medications as ordered  Will D/c PRN meds if not used within 14 days   4. Hyperlipidemia LDL goal <100  Stable without medical intervention   5. Adjustment disorder with mixed anxiety and depressed mood   does not want Wellbutrin - do not see need for this medication at this current time   6. Physical deconditioning  PT/OT adv per their recommendations         Electronically signed by:  BERNA Conrad CNP

## 2018-03-27 NOTE — LETTER
3/27/2018        RE: Adriane Flores  [address]  [facility] [address]        [facility] [place]    PRIMARY CARE PROVIDER AND CLINIC:  Nikolov, Nikolay G [address] [facility] / [facility] [address]    Chief Complaint   Patient presents with     Hospital F/U       HPI:    Adriane Flores is a 77 year old  (1940),admitted to the [facility] from [facility].  Hospital stay 3/19/2018 through 3/24/2018.  Admitted to this facility for  rehab, medical management and nursing care.  HPI information obtained from: facility chart records, facility staff and patient report.         Hospital Course   Adriane Flores was admitted on 3/19/2018.  The following problems were addressed during her hospitalization:     R Femoral Neck Fracture with impaction s/p R hip hemiarthroplasty on 3/20/18:   Likely occurred after a fall sustained 3d prior to presentation. No LOC with fall. Ortho consulted and she underwent a R bipolar hip hemiarthroplasty per Dr. Ellington on 3/20/18. Postop course was uneventful. Avoided narcotics given predisposition to delirium with her dementia. Her pain was managed with sched Tylenol (325mg TID), though she would intermittently refuse doses. Seen by therapy services postop, difficult time with participation given her advanced dementia. Recommended discharge to TCU. Discharge on Xarelto for DVT ppx. Will need to be monitored closely at TCU while on anticoagulation given her fall risk.      Advanced Alzheimer's Dementia  Prior to admission she had been residing with her  at home. Has a caregiver 6d/wk (exept for Sundays from 8-5).   Kept on PTA meds this stay, including Namenda, Aricept, Wellbutrin and prn Ativan, though some formulations were changed from long acting to short acting as she was requiring her meds to be crushed and given with applesauce.   PRN Seroquel ordered to help manage agitation/insomnia -- had minimal  need during postop period  Discharged to memory care TCU for ongoing care.      Discharge Pain Plan:  - During her hospitalization, Adriane experienced pain due to dementia.  She is unable to participate in a plan for discharge pain management.   - See above -- she will discharge with Tylenol for pain, has not needed Tramadol or narcotics this stay so they will not prescribed at discharge  _________________________________________  Current issues are:  Closed fracture of neck of right femur with routine healing  History of right hip hemiarthroplasty  As noted above  Patient denies pain today  PT/OT eval/tx ordered  Tylenol for pain as noted above - has not requested/noted need for stronger pain control regimen since admission  Xarelto for anticoagulation    Alzheimer's dementia without behavioral disturbance, unspecified timing of dementia onset  Chronic; progressive; continues to require 24-hr supportive cares  Patient very confused at baseline - responds with simple short answers  Does live at home with her  prior to fall  On regimen of Namenda, Aricept, Seroquel and Ativan  Has not had behaviors since admission - has not required use of Ativan or Seroquel    Hyperlipidemia LDL goal <100  Chronic  No active tx regimen  Recent Labs   Lab Test  03/10/17   1033  02/23/16   0917  02/18/15   0837  02/03/14   0954   CHOL  214*  226*  219*  202*   HDL  53  70  68  55   LDL  128*  139*  136*  129   TRIG  166*  84  74  88   CHOLHDLRATIO   --    --   3.2  3.7     Adjustment disorder with mixed anxiety and depressed mood  On Wellbutrin at time of admission - this has been held since her arrival as  notes that she is not depressed and does not require this medication  Patient does not display any active s/sx of current depression - very happy in demeanor and pleasant with conversation, smiling frequently    Physical deconditioning  2/2 hospitalization and above noted diagnoses  PT/OT eval/tx ordered    CODE  STATUS/ADVANCE DIRECTIVES DISCUSSION:   CPR/Full code   Patient's living condition: lives with spouse    ALLERGIES:Sulfa drugs  PAST MEDICAL HISTORY:  has a past medical history of Displacement of intervertebral disc, site unspecified, without myelopathy (1/88); Lateral epicondylitis  of elbow; Other internal derangement of knee(717.89); and Other internal derangement of knee(717.89).  PAST SURGICAL HISTORY:  has a past surgical history that includes NONSPECIFIC PROCEDURE; NONSPECIFIC PROCEDURE (1983); NONSPECIFIC PROCEDURE (1949); photorefractive keratectomy (2000); KNEE SCOPE,DIAGNOSTIC (Left 2000); KNEE SCOPE,DIAGNOSTIC (Right 2001); Colonoscopy (2/8/2013); and Open reduction internal fixation hip bipolar (Right, 3/20/2018).  FAMILY HISTORY: family history includes Alcohol/Drug in her brother and mother; CEREBROVASCULAR DISEASE in her father and maternal grandmother; Cancer - colorectal in her maternal aunt; Hypertension in her maternal grandmother.  SOCIAL HISTORY:  reports that she quit smoking about 47 years ago. She has a 7.50 pack-year smoking history. She has never used smokeless tobacco. She reports that she does not drink alcohol or use illicit drugs.    Post Discharge Medication Reconciliation Status: discharge medications reconciled, continue medications without change.  Current Outpatient Prescriptions   Medication Sig Dispense Refill     memantine XR (NAMENDA XR) 28 MG 24 hr capsule Take 1 capsule (28 mg) by mouth daily       QUEtiapine (SEROQUEL) 25 MG tablet Take 0.5 tablets (12.5 mg) by mouth 2 times daily as needed (give for severe agitation/at danger to self/others during the day, or for persistent insomina, mild agitation at night.) 60 tablet      acetaminophen (TYLENOL) 325 MG tablet Take 2 tablets (650 mg) by mouth every 8 hours 60 tablet 0     LORazepam (ATIVAN) 0.5 MG tablet Take 1 tablet (0.5 mg) by mouth every 8 hours as needed for anxiety 30 tablet 0     rivaroxaban ANTICOAGULANT  (XARELTO) 10 MG TABS tablet Take 1 tablet (10 mg) by mouth daily (with dinner) 21 tablet 0     donepezil (ARICEPT) 5 MG tablet Take 1 tablet (5 mg) by mouth At Bedtime 90 tablet 3     buPROPion (WELLBUTRIN) 75 MG tablet Take 1 tablet (75 mg) by mouth 2 times daily (Patient not taking: Reported on 3/27/2018) 90 tablet        ROS:  10 point ROS of systems including Constitutional, Eyes, Respiratory, Cardiovascular, Gastroenterology, Genitourinary, Integumentary, Muscularskeletal, Psychiatric were all negative except for pertinent positives noted in my HPI.    Exam:  /88  Pulse 97  Temp 98.6  F (37  C)  Resp 16  SpO2 96%  GENERAL APPEARANCE:  Alert, in no distress, appears healthy, thin, cooperative, pleasantly confused woman appearing younger than noted age  ENT:  Mouth and posterior oropharynx normal, moist mucous membranes, normal hearing acuity  EYES:  EOM, conjunctivae, lids, pupils and irises normal  NECK:  No adenopathy,masses or thyromegaly  RESP:  respiratory effort and palpation of chest normal, lungs clear to auscultation , no respiratory distress  CV:  Palpation and auscultation of heart done , regular rate and rhythm, no murmur, rub, or gallop, no edema, +2 pedal pulses  ABDOMEN:  normal bowel sounds, soft, nontender, no hepatosplenomegaly or other masses  M/S:   Gait and station abnormal - GABBY 2/2 patient being in w/c  Digits and nails abnormal - arthritic changes present  Examination of:   right lower extremity  Inspection, ROM, stability and muscle strength decreased 2/2 hip fx  SKIN:  Palpation of skin and subcutaneous tissue baseline, Inspection of skin and subcutaneous tissue abnormal, GABBY incision today as patient is up in w/c  NEURO:   Cranial nerves 2-12 are normal tested and grossly at patient's baseline  PSYCH:  oriented to slef, insight and judgement impaired, memory impaired , affect and mood normal     BP Readin/89, 151/73, 122/76            HR:      Lab/Diagnostic  data:  CBC RESULTS:   Recent Labs   Lab Test  03/23/18   0554  03/22/18   0630  03/21/18   0701  03/20/18   2110  03/19/18   2156  07/05/17   1145   WBC   --    --    --    --   9.8  6.0   RBC   --    --    --    --   4.10  4.66   HGB   --   10.9*  11.6*   --   12.5  14.5   HCT   --    --    --    --   37.7  43.7   MCV   --    --    --    --   92  94   MCH   --    --    --    --   30.5  31.1   MCHC   --    --    --    --   33.2  33.2   RDW   --    --    --    --   12.5  13.1   PLT  264   --    --   237  250  242       Last Basic Metabolic Panel:  Recent Labs   Lab Test  03/24/18   0659  03/22/18   0630  03/21/18   0701   NA   --   141  138   POTASSIUM   --   3.8  4.2   CHLORIDE   --   109  106   AWILDA   --   8.0*  7.9*   CO2   --   25  26   BUN   --   12  16   CR  0.57  0.72  0.74   GLC   --   99  92       Liver Function Studies -   Recent Labs   Lab Test  07/05/17   1145  03/10/17   1033   PROTTOTAL  7.2  7.1   ALBUMIN  3.9  4.0   BILITOTAL  0.4  0.4   ALKPHOS  69  76   AST  20  16   ALT  19  21       TSH   Date Value Ref Range Status   03/10/2017 2.01 0.40 - 4.00 mU/L Final   02/23/2016 2.77 0.40 - 4.00 mU/L Final         ASSESSMENT/PLAN:   Diagnosis Comments   1. Closed fracture of neck of right femur with routine healing  Ortho intervention as above  F/u per their recommendations  Continue Tylenol for pain control as ordered   2. History of right hip hemiarthroplasty  As above  Xarelto for DVT prophylaxis  Wound care as ordered   3. Alzheimer's dementia without behavioral disturbance, unspecified timing of dementia onset  Pleasantly confused  Continue scheduled medications as ordered  Will D/c PRN meds if not used within 14 days   4. Hyperlipidemia LDL goal <100  Stable without medical intervention   5. Adjustment disorder with mixed anxiety and depressed mood   does not want Wellbutrin - do not see need for this medication at this current time   6. Physical deconditioning  PT/OT adv per their  recommendations         Electronically signed by:  BERNA Conrad CNP                    Sincerely,        BERNA Conrad CNP

## 2018-04-04 NOTE — PROGRESS NOTES
"[place] [facility]  INITIAL VISIT NOTE  April 5, 2018    PRIMARY CARE PROVIDER AND CLINIC:  Saul Wheeler [address] / [facility] [address]    Chief Complaint   Patient presents with     Hospital F/U       HPI:    Adriane Flores is a 78 year old  (1940) female who was seen at [facility] on April [facility], 2018 for an initial visit. Medical history is notable for advanced dementia and osteoporosis with chronic R hip pain. She was hospitalized at [facility] from 3/19/18 to 3/24/18 where she presented after a fall and was found to have an impacted R femoral neck fracture s/p hemiarthroplasty (3/20/18). Surgery without complication. Developed acute blood loss anemia (Hgb 12.5 --> 10.9). She was admitted to this facility for medical management and rehab.     Today, Ms. Flores is seen in her room. She is unable to provide any history due to her dementia. All answers are a smile and one word not related the question such as \"good\" and \"okay\". No concerns per nursing. Working with therapies.     CODE STATUS:   CPR/Full code     ALLERGIES:     Allergies   Allergen Reactions     Sulfa Drugs      30 yrs ago didn't feel well       PAST MEDICAL HISTORY:   Past Medical History:   Diagnosis Date     Displacement of intervertebral disc, site unspecified, without myelopathy 1/88    Herniated disc     Lateral epicondylitis  of elbow      Other internal derangement of knee(717.89)     Internal derangement, knee     Other internal derangement of knee(717.89)     Internal derangement, knee       PAST SURGICAL HISTORY:   Past Surgical History:   Procedure Laterality Date     C NONSPECIFIC PROCEDURE      T&A     C NONSPECIFIC PROCEDURE  1983    Tennis elbow surgery     C NONSPECIFIC PROCEDURE  1949    Left leg surgery secondary to accident     COLONOSCOPY  2/8/2013    Procedure: COLONOSCOPY;  Colonoscopy No biopsies taken. ;  Surgeon: Chavez Guillaume MD;  Location:  GI      KNEE SCOPE, " DIAGNOSTIC  Left     Arthroscopy, Knee     HC KNEE SCOPE, DIAGNOSTIC  Right     Arthroscopy, Knee     OPEN REDUCTION INTERNAL FIXATION HIP BIPOLAR Right 3/20/2018    Procedure: OPEN REDUCTION INTERNAL FIXATION HIP BIPOLAR;  RIGHT BIPOLAR HIP FRACTURE.(BENNETT/NEPHEW);  Surgeon: Cayetano Ellington MD;  Location: [facility] OR     PHOTOREFRACTIVE KERATECTOMY      Right       FAMILY HISTORY:   Family History   Problem Relation Age of Onset     Alcohol/Drug Mother       @79 of ?liver disease     CEREBROVASCULAR DISEASE Father      @69 of cva     Alcohol/Drug Brother      Recovering     CEREBROVASCULAR DISEASE Maternal Grandmother      Hypertension Maternal Grandmother      Cancer - colorectal Maternal Aunt      SOCIAL HISTORY:   Lives with spouse     MEDICATIONS:  Current Outpatient Prescriptions   Medication Sig Dispense Refill     sennosides (SENOKOT) 8.6 MG tablet Take 2 tablets by mouth 2 times daily as needed for constipation Give 2 tablet by mouth as needed for Constipation until 2018       acetaminophen (TYLENOL) 500 MG tablet Take 1,000 mg by mouth 3 times daily       memantine XR (NAMENDA XR) 28 MG 24 hr capsule Take 1 capsule (28 mg) by mouth daily       QUEtiapine (SEROQUEL) 25 MG tablet Take 0.5 tablets (12.5 mg) by mouth 2 times daily as needed (give for severe agitation/at danger to self/others during the day, or for persistent insomina, mild agitation at night.) 60 tablet      LORazepam (ATIVAN) 0.5 MG tablet Take 1 tablet (0.5 mg) by mouth every 8 hours as needed for anxiety 30 tablet 0     rivaroxaban ANTICOAGULANT (XARELTO) 10 MG TABS tablet Take 1 tablet (10 mg) by mouth daily (with dinner) 21 tablet 0     donepezil (ARICEPT) 5 MG tablet Take 1 tablet (5 mg) by mouth At Bedtime 90 tablet 3       Post Discharge Medication Reconciliation Status: medication reconcilation previously completed during another office visit.    ROS:  Unable to obtain due to cognitive impairment or  aphasia    PHYSICAL EXAM:  /61  Pulse 79  Temp 98.8  F (37.1  C)  Resp 16  SpO2 96%  Gen: laying in bed, alert, cooperative and in no acute distress  HEENT: normocephalic; oropharynx clear  Card: RRR, S1, S2, no murmurs  Resp: lungs clear to auscultation bilaterally  GI: abdomen soft, not-distended  MSK: normal muscle tone, no LE edema  Neuro: CX II-XII grossly in tact; ROM in all four extremities grossly in tact  Psych: memory, judgement and insight impaired  Skin: R lateral hip incision with staples in place, healing well without erythema or drainage    LABORATORY/IMAGING DATA:  Reviewed as per Epic    ASSESSMENT/PLAN:    R Femoral Neck Fracture s/p Hemiarthroplasty (3/20/18)  Secondary to a fall.  Surgery without complication.   -- analgesia with acetaminophen 1000 mg TID  -- DVT prophylaxis with rovaroxaban per ortho  -- ongoing PT/OT  -- follow up with ortho as scheduled    Acute Blood Loss Anemia  Secondary to above. No evidence of ongoing bleeding. Hgb 12.5 --> 10.9  -- repeat Hgb to ensure stability    Alzheimer's Dementia Without Behavioral Disturbance  Oriented to self. Paucity of speech.   -- OT following  -- continues on donepzeil 5 mg qhs and memantine XR 28 mg daily  -- supportive cares    Fall  Physical Deconditioning  In setting of hospitalization and underlying medical conditions  -- ongoing PT/OT        Electronically signed by:  Krista Moscoso MD

## 2018-04-05 ENCOUNTER — NURSING HOME VISIT (OUTPATIENT)
Dept: GERIATRICS | Facility: CLINIC | Age: 78
End: 2018-04-05
Payer: COMMERCIAL

## 2018-04-05 VITALS
RESPIRATION RATE: 16 BRPM | SYSTOLIC BLOOD PRESSURE: 146 MMHG | TEMPERATURE: 98.8 F | HEART RATE: 79 BPM | DIASTOLIC BLOOD PRESSURE: 61 MMHG | OXYGEN SATURATION: 96 %

## 2018-04-05 DIAGNOSIS — D62 ANEMIA DUE TO BLOOD LOSS, ACUTE: ICD-10-CM

## 2018-04-05 DIAGNOSIS — R53.81 PHYSICAL DECONDITIONING: ICD-10-CM

## 2018-04-05 DIAGNOSIS — Z47.89 AFTERCARE FOLLOWING SURGERY OF THE MUSCULOSKELETAL SYSTEM: Primary | ICD-10-CM

## 2018-04-05 DIAGNOSIS — G30.0 EARLY ONSET ALZHEIMER'S DEMENTIA WITHOUT BEHAVIORAL DISTURBANCE (H): ICD-10-CM

## 2018-04-05 DIAGNOSIS — S72.001D CLOSED FRACTURE OF NECK OF RIGHT FEMUR WITH ROUTINE HEALING, SUBSEQUENT ENCOUNTER: ICD-10-CM

## 2018-04-05 DIAGNOSIS — F02.80 EARLY ONSET ALZHEIMER'S DEMENTIA WITHOUT BEHAVIORAL DISTURBANCE (H): ICD-10-CM
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
## 2018-04-05 PROCEDURE — 99305 1ST NF CARE MODERATE MDM 35: CPT | Performed by: INTERNAL MEDICINE

## 2018-04-05 RX ORDER — SENNOSIDES 8.6 MG
2 TABLET ORAL 2 TIMES DAILY PRN
COMMUNITY
End: 2018-04-10

## 2018-04-05 RX ORDER — ACETAMINOPHEN 500 MG
1000 TABLET ORAL 3 TIMES DAILY
COMMUNITY

## 2018-04-05 NOTE — LETTER
"    4/5/2018        RE: Adriane Flores  [address] [facility] [address]        [place] [facility]  INITIAL VISIT NOTE  April 5, 2018    PRIMARY CARE PROVIDER AND CLINIC:  Nikolov, Nikolay G [address] [facility] / [facility] [address]    Chief Complaint   Patient presents with     Hospital F/U       HPI:    Adriane Flores is a 78 year old  (1940) female who [facility] for an initial visit. Medical history is notable for advanced dementia and osteoporosis with chronic R hip pain. She was hospitalized at [facility] from 3/19/18 to 3/24/18 where she presented after a fall and was found to have an impacted R femoral neck fracture s/p hemiarthroplasty (3/20/18). Surgery without complication. Developed acute blood loss anemia (Hgb 12.5 --> 10.9). She was admitted to this facility for medical management and rehab.     Today, Ms. Flores is seen in her room. She is unable to provide any history due to her dementia. All answers are a smile and one word not related the question such as \"good\" and \"okay\". No concerns per nursing. Working with therapies.     CODE STATUS:   CPR/Full code     ALLERGIES:     Allergies   Allergen Reactions     Sulfa Drugs      30 yrs ago didn't feel well       PAST MEDICAL HISTORY:   Past Medical History:   Diagnosis Date     Displacement of intervertebral disc, site unspecified, without myelopathy 1/88    Herniated disc     Lateral epicondylitis  of elbow      Other internal derangement of knee(717.89)     Internal derangement, knee     Other internal derangement of knee(717.89)     Internal derangement, knee       PAST SURGICAL HISTORY:   Past Surgical History:   Procedure Laterality Date     C NONSPECIFIC PROCEDURE      T&A     C NONSPECIFIC PROCEDURE  1983    Tennis elbow surgery     C NONSPECIFIC PROCEDURE  1949    Left leg surgery secondary to accident     COLONOSCOPY  2/8/2013    Procedure: COLONOSCOPY;  " Colonoscopy No biopsies taken. ;  Surgeon: Chavez Guillaume MD;  Location: [facility] GI     HC KNEE SCOPE, DIAGNOSTIC  Left     Arthroscopy, Knee     HC KNEE SCOPE, DIAGNOSTIC  Right     Arthroscopy, Knee     OPEN REDUCTION INTERNAL FIXATION HIP BIPOLAR Right 3/20/2018    Procedure: OPEN REDUCTION INTERNAL FIXATION HIP BIPOLAR;  RIGHT BIPOLAR HIP FRACTURE.(BENNETT/NEPHEW);  Surgeon: Cayetano Ellington MD;  Location: [facility] OR     PHOTOREFRACTIVE KERATECTOMY      Right       FAMILY HISTORY:   Family History   Problem Relation Age of Onset     Alcohol/Drug Mother       @79 of ?liver disease     CEREBROVASCULAR DISEASE Father      @69 of cva     Alcohol/Drug Brother      Recovering     CEREBROVASCULAR DISEASE Maternal Grandmother      Hypertension Maternal Grandmother      Cancer - colorectal Maternal Aunt      SOCIAL HISTORY:   Lives with spouse     MEDICATIONS:  Current Outpatient Prescriptions   Medication Sig Dispense Refill     sennosides (SENOKOT) 8.6 MG tablet Take 2 tablets by mouth 2 times daily as needed for constipation Give 2 tablet by mouth as needed for Constipation until 2018       acetaminophen (TYLENOL) 500 MG tablet Take 1,000 mg by mouth 3 times daily       memantine XR (NAMENDA XR) 28 MG 24 hr capsule Take 1 capsule (28 mg) by mouth daily       QUEtiapine (SEROQUEL) 25 MG tablet Take 0.5 tablets (12.5 mg) by mouth 2 times daily as needed (give for severe agitation/at danger to self/others during the day, or for persistent insomina, mild agitation at night.) 60 tablet      LORazepam (ATIVAN) 0.5 MG tablet Take 1 tablet (0.5 mg) by mouth every 8 hours as needed for anxiety 30 tablet 0     rivaroxaban ANTICOAGULANT (XARELTO) 10 MG TABS tablet Take 1 tablet (10 mg) by mouth daily (with dinner) 21 tablet 0     donepezil (ARICEPT) 5 MG tablet Take 1 tablet (5 mg) by mouth At Bedtime 90 tablet 3       Post Discharge Medication Reconciliation Status: medication reconcilation previously  completed during another office visit.    ROS:  Unable to obtain due to cognitive impairment or aphasia    PHYSICAL EXAM:  /61  Pulse 79  Temp 98.8  F (37.1  C)  Resp 16  SpO2 96%  Gen: laying in bed, alert, cooperative and in no acute distress  HEENT: normocephalic; oropharynx clear  Card: RRR, S1, S2, no murmurs  Resp: lungs clear to auscultation bilaterally  GI: abdomen soft, not-distended  MSK: normal muscle tone, no LE edema  Neuro: CX II-XII grossly in tact; ROM in all four extremities grossly in tact  Psych: memory, judgement and insight impaired  Skin: R lateral hip incision with staples in place, healing well without erythema or drainage    LABORATORY/IMAGING DATA:  Reviewed as per Epic    ASSESSMENT/PLAN:    R Femoral Neck Fracture s/p Hemiarthroplasty (3/20/18)  Secondary to a fall.  Surgery without complication.   -- analgesia with acetaminophen 1000 mg TID  -- DVT prophylaxis with rovaroxaban per ortho  -- ongoing PT/OT  -- follow up with ortho as scheduled    Acute Blood Loss Anemia  Secondary to above. No evidence of ongoing bleeding. Hgb 12.5 --> 10.9  -- repeat Hgb to ensure stability    Alzheimer's Dementia Without Behavioral Disturbance  Oriented to self. Paucity of speech.   -- OT following  -- continues on donepzeil 5 mg qhs and memantine XR 28 mg daily  -- supportive cares    Fall  Physical Deconditioning  In setting of hospitalization and underlying medical conditions  -- ongoing PT/OT        Electronically signed by:  Krista Moscoso MD

## 2018-04-10 ENCOUNTER — NURSING HOME VISIT (OUTPATIENT)
Dept: GERIATRICS | Facility: CLINIC | Age: 78
End: 2018-04-10
Payer: COMMERCIAL

## 2018-04-10 ENCOUNTER — RECORDS - HEALTHEAST (OUTPATIENT)
Dept: LAB | Facility: CLINIC | Age: 78
End: 2018-04-10

## 2018-04-10 VITALS
SYSTOLIC BLOOD PRESSURE: 164 MMHG | OXYGEN SATURATION: 97 % | TEMPERATURE: 98.4 F | HEART RATE: 70 BPM | RESPIRATION RATE: 18 BRPM | DIASTOLIC BLOOD PRESSURE: 77 MMHG

## 2018-04-10 DIAGNOSIS — F02.80 ALZHEIMER'S DEMENTIA WITHOUT BEHAVIORAL DISTURBANCE, UNSPECIFIED TIMING OF DEMENTIA ONSET: ICD-10-CM

## 2018-04-10 DIAGNOSIS — F43.23 ADJUSTMENT DISORDER WITH MIXED ANXIETY AND DEPRESSED MOOD: ICD-10-CM

## 2018-04-10 DIAGNOSIS — Z96.641 HISTORY OF RIGHT HIP HEMIARTHROPLASTY: ICD-10-CM

## 2018-04-10 DIAGNOSIS — G30.9 ALZHEIMER'S DEMENTIA WITHOUT BEHAVIORAL DISTURBANCE, UNSPECIFIED TIMING OF DEMENTIA ONSET: ICD-10-CM

## 2018-04-10 DIAGNOSIS — R53.81 PHYSICAL DECONDITIONING: ICD-10-CM

## 2018-04-10 DIAGNOSIS — E78.5 HYPERLIPIDEMIA LDL GOAL <100: ICD-10-CM

## 2018-04-10 DIAGNOSIS — S72.001D CLOSED FRACTURE OF NECK OF RIGHT FEMUR WITH ROUTINE HEALING: Primary | ICD-10-CM

## 2018-04-10 PROCEDURE — 99309 SBSQ NF CARE MODERATE MDM 30: CPT | Performed by: NURSE PRACTITIONER

## 2018-04-10 NOTE — LETTER
4/10/2018        RE: Adriane Flores  [address] [facility] [address]        [place] [facility]    Chief Complaint   Patient presents with     RECHECK       HPI:    Adriane Flores is a 78 year old  (1940), who is being seen today for an episodic care visit at [facility].  HPI information obtained from: facility chart records, facility staff and patient report.    Today's concern is:  Closed fracture of neck of right femur with routine healing  History of right hip hemiarthroplasty  Likely occurred after a fall sustained 3d prior to presentation. No LOC with fall. Ortho consulted and she underwent a R bipolar hip hemiarthroplasty per Dr. Ellington on 3/20/18. Postop course was uneventful. Avoided narcotics given predisposition to delirium with her dementia. Her pain was managed with sched Tylenol (325mg TID), though she would intermittently refuse doses. Seen by therapy services postop, difficult time with participation given her advanced dementia. Recommended discharge to TCU. Discharge on Xarelto for DVT ppx. Will need to be monitored closely at TCU while on anticoagulation given her fall risk.  Patient denies pain today  PT/OT following  Tylenol for pain as noted above - has not requested/noted need for stronger pain control regimen since admission  Xarelto for anticoagulation    Alzheimer's dementia without behavioral disturbance, unspecified timing of dementia onset  Prior to admission she had been residing with her  at home. Has a caregiver 6d/wk (exept for Sundays from 8-5).   Kept on PTA meds this stay, including Namenda, Aricept, Wellbutrin and prn Ativan, though some formulations were changed from long acting to short acting as she was requiring her meds to be crushed and given with applesauce.   PRN Seroquel ordered to help manage agitation/insomnia -- had minimal need during postop period  Discharged to memory care TCU for ongoing  care.  Chronic; progressive; continues to require 24-hr supportive cares  Patient very confused at baseline - responds with simple short answers  Does live at home with her  prior to fall  On regimen of Namenda, Aricept, Seroquel and Ativan  Has not had behaviors since admission - has not required use of Ativan or Seroquel    Hyperlipidemia LDL goal <100  Chronic  No active tx regimen  Recent Labs   Lab Test  03/10/17   1033  02/23/16   0917  02/18/15   0837  02/03/14   0954   CHOL  214*  226*  219*  202*   HDL  53  70  68  55   LDL  128*  139*  136*  129   TRIG  166*  84  74  88   CHOLHDLRATIO   --    --   3.2  3.7     Adjustment disorder with mixed anxiety and depressed mood  No active tx regimen   Patient does not display any active s/sx of current depression - very happy in demeanor and pleasant with conversation, smiling frequently    Physical deconditioning  2/2 hospitalization and above noted diagnoses  PT/OT following    ALLERGIES: Sulfa drugs  Past Medical, Surgical, Family and Social History reviewed and updated in [employer].    Current Outpatient Prescriptions   Medication Sig Dispense Refill     acetaminophen (TYLENOL) 500 MG tablet Take 1,000 mg by mouth 3 times daily       memantine XR (NAMENDA XR) 28 MG 24 hr capsule Take 1 capsule (28 mg) by mouth daily       QUEtiapine (SEROQUEL) 25 MG tablet Take 0.5 tablets (12.5 mg) by mouth 2 times daily as needed (give for severe agitation/at danger to self/others during the day, or for persistent insomina, mild agitation at night.) 60 tablet      LORazepam (ATIVAN) 0.5 MG tablet Take 1 tablet (0.5 mg) by mouth every 8 hours as needed for anxiety 30 tablet 0     rivaroxaban ANTICOAGULANT (XARELTO) 10 MG TABS tablet Take 1 tablet (10 mg) by mouth daily (with dinner) 21 tablet 0     donepezil (ARICEPT) 5 MG tablet Take 1 tablet (5 mg) by mouth At Bedtime 90 tablet 3     Medications reviewed:  Medications reconciled to facility chart and changes were made to  reflect current medications as identified as above med list. Below are the changes that were made:   Medications stopped since last EPIC medication reconciliation:   Medications Discontinued During This Encounter   Medication Reason     sennosides (SENOKOT) 8.6 MG tablet Discontinued by another Health Care Provider       Medications started since last [facility] medication reconciliation:  No orders of the defined types were placed in this encounter.    REVIEW OF SYSTEMS:  4 point ROS including Respiratory, CV, GI and , other than that noted in the HPI,  is negative    Physical Exam:  /77  Pulse 70  Temp 98.4  F (36.9  C)  Resp 18  SpO2 97%  BP Readin/65, 162/64, 129/106              HR:  38-90  ENERAL APPEARANCE:  Alert, in no distress, appears healthy, thin, cooperative, pleasantly confused woman appearing younger than noted age upright in w/c in dining room  ENT:  Mouth and posterior oropharynx normal, moist mucous membranes, normal hearing acuity  EYES:  EOM, conjunctivae, lids, pupils and irises normal  NECK:  No adenopathy, masses or thyromegaly  RESP:  Respiratory effort and palpation of chest normal, lungs clear to auscultation, no respiratory distress  CV:  Palpation and auscultation of heart done, regular rate and rhythm, no murmur, rub, or gallop, no edema, +2 pedal pulses  ABDOMEN:  normal bowel sounds, soft, nontender, no hepatosplenomegaly or other masses  M/S:   Gait and station abnormal - GABBY 2/2 patient being in w/c; Digits and nails abnormal - arthritic changes present; Examination of  right lower extremity  Inspection, ROM, stability and muscle strength decreased 2/2 hip fx  SKIN:  Palpation of skin and subcutaneous tissue baseline, Inspection of skin and subcutaneous tissue abnormal, R hip incision C/D/I with staples - no s/sx of infection  NEURO:   Cranial nerves 2-12 are normal tested and grossly at patient's baseline  PSYCH:  oriented to slef, insight and judgement impaired,  memory impaired, affect and mood normal     Recent Labs:   CBC RESULTS:   Recent Labs   Lab Test  03/23/18   0554  03/22/18   0630  03/21/18   0701  03/20/18   2110  03/19/18   2156  07/05/17   1145   WBC   --    --    --    --   9.8  6.0   RBC   --    --    --    --   4.10  4.66   HGB   --   10.9*  11.6*   --   12.5  14.5   HCT   --    --    --    --   37.7  43.7   MCV   --    --    --    --   92  94   MCH   --    --    --    --   30.5  31.1   MCHC   --    --    --    --   33.2  33.2   RDW   --    --    --    --   12.5  13.1   PLT  264   --    --   237  250  242       Last Basic Metabolic Panel:  Recent Labs   Lab Test  03/24/18   0659  03/22/18   0630  03/21/18   0701   NA   --   141  138   POTASSIUM   --   3.8  4.2   CHLORIDE   --   109  106   AWILDA   --   8.0*  7.9*   CO2   --   25  26   BUN   --   12  16   CR  0.57  0.72  0.74   GLC   --   99  92       Liver Function Studies -   Recent Labs   Lab Test  07/05/17   1145  03/10/17   1033   PROTTOTAL  7.2  7.1   ALBUMIN  3.9  4.0   BILITOTAL  0.4  0.4   ALKPHOS  69  76   AST  20  16   ALT  19  21       TSH   Date Value Ref Range Status   03/10/2017 2.01 0.40 - 4.00 mU/L Final   02/23/2016 2.77 0.40 - 4.00 mU/L Final         Assessment/Plan:   Diagnosis Comments   1. Closed fracture of neck of right femur with routine healing  Ortho intervention as above  F/u per their recommendations  Continue Tylenol for pain control as ordered   2. History of right hip hemiarthroplasty  As above  Xarelto for DVT prophylaxis  Wound care as ordered   3. Alzheimer's dementia without behavioral disturbance, unspecified timing of dementia onset  Pleasantly confused  Continue scheduled medications as ordered   4. Hyperlipidemia LDL goal <100  Stable without medical intervention   5. Adjustment disorder with mixed anxiety and depressed mood  Stable without medical intervention   6. Physical deconditioning  PT/OT adv per their recommendations       Electronically signed by  Karen Arita  BERNA Currie CNP                      Sincerely,        BERNA Conrad CNP

## 2018-04-10 NOTE — PROGRESS NOTES
[place] [facility]    Chief Complaint   Patient presents with     RECHECK       HPI:    Adriane Flores is a 78 year old  (1940), who is being seen today for an episodic care visit at [facility].  HPI information obtained from: facility chart records, facility staff and patient report.    Today's concern is:  Closed fracture of neck of right femur with routine healing  History of right hip hemiarthroplasty  Likely occurred after a fall sustained 3d prior to presentation. No LOC with fall. Ortho consulted and she underwent a R bipolar hip hemiarthroplasty per Dr. Ellington on 3/20/18. Postop course was uneventful. Avoided narcotics given predisposition to delirium with her dementia. Her pain was managed with sched Tylenol (325mg TID), though she would intermittently refuse doses. Seen by therapy services postop, difficult time with participation given her advanced dementia. Recommended discharge to TCU. Discharge on Xarelto for DVT ppx. Will need to be monitored closely at TCU while on anticoagulation given her fall risk.  Patient denies pain today  PT/OT following  Tylenol for pain as noted above - has not requested/noted need for stronger pain control regimen since admission  Xarelto for anticoagulation    Alzheimer's dementia without behavioral disturbance, unspecified timing of dementia onset  Prior to admission she had been residing with her  at home. Has a caregiver 6d/wk (exept for Sundays from 8-5).   Kept on PTA meds this stay, including Namenda, Aricept, Wellbutrin and prn Ativan, though some formulations were changed from long acting to short acting as she was requiring her meds to be crushed and given with applesauce.   PRN Seroquel ordered to help manage agitation/insomnia -- had minimal need during postop period  Discharged to memory care TCU for ongoing care.  Chronic; progressive; continues to require 24-hr supportive cares  Patient very confused at baseline -  responds with simple short answers  Does live at home with her  prior to fall  On regimen of Namenda, Aricept, Seroquel and Ativan  Has not had behaviors since admission - has not required use of Ativan or Seroquel    Hyperlipidemia LDL goal <100  Chronic  No active tx regimen  Recent Labs   Lab Test  03/10/17   1033  02/23/16   0917  02/18/15   0837  02/03/14   0954   CHOL  214*  226*  219*  202*   HDL  53  70  68  55   LDL  128*  139*  136*  129   TRIG  166*  84  74  88   CHOLHDLRATIO   --    --   3.2  3.7     Adjustment disorder with mixed anxiety and depressed mood  No active tx regimen   Patient does not display any active s/sx of current depression - very happy in demeanor and pleasant with conversation, smiling frequently    Physical deconditioning  2/2 hospitalization and above noted diagnoses  PT/OT following    ALLERGIES: Sulfa drugs  Past Medical, Surgical, Family and Social History reviewed and updated in [email or web address].    Current Outpatient Prescriptions   Medication Sig Dispense Refill     acetaminophen (TYLENOL) 500 MG tablet Take 1,000 mg by mouth 3 times daily       memantine XR (NAMENDA XR) 28 MG 24 hr capsule Take 1 capsule (28 mg) by mouth daily       QUEtiapine (SEROQUEL) 25 MG tablet Take 0.5 tablets (12.5 mg) by mouth 2 times daily as needed (give for severe agitation/at danger to self/others during the day, or for persistent insomina, mild agitation at night.) 60 tablet      LORazepam (ATIVAN) 0.5 MG tablet Take 1 tablet (0.5 mg) by mouth every 8 hours as needed for anxiety 30 tablet 0     rivaroxaban ANTICOAGULANT (XARELTO) 10 MG TABS tablet Take 1 tablet (10 mg) by mouth daily (with dinner) 21 tablet 0     donepezil (ARICEPT) 5 MG tablet Take 1 tablet (5 mg) by mouth At Bedtime 90 tablet 3     Medications reviewed:  Medications reconciled to facility chart and changes were made to reflect current medications as identified as above med list. Below are the changes that were made:   Medications  stopped since last EPIC medication reconciliation:   Medications Discontinued During This Encounter   Medication Reason     sennosides (SENOKOT) 8.6 MG tablet Discontinued by another Health Care Provider       Medications started since last [facility] medication reconciliation:  No orders of the defined types were placed in this encounter.    REVIEW OF SYSTEMS:  4 point ROS including Respiratory, CV, GI and , other than that noted in the HPI,  is negative    Physical Exam:  /77  Pulse 70  Temp 98.4  F (36.9  C)  Resp 18  SpO2 97%  BP Readin/65, 162/64, 129/106              HR:  38-90  ENERAL APPEARANCE:  Alert, in no distress, appears healthy, thin, cooperative, pleasantly confused woman appearing younger than noted age upright in w/c in dining room  ENT:  Mouth and posterior oropharynx normal, moist mucous membranes, normal hearing acuity  EYES:  EOM, conjunctivae, lids, pupils and irises normal  NECK:  No adenopathy, masses or thyromegaly  RESP:  Respiratory effort and palpation of chest normal, lungs clear to auscultation, no respiratory distress  CV:  Palpation and auscultation of heart done, regular rate and rhythm, no murmur, rub, or gallop, no edema, +2 pedal pulses  ABDOMEN:  normal bowel sounds, soft, nontender, no hepatosplenomegaly or other masses  M/S:   Gait and station abnormal - GABBY 2/2 patient being in w/c; Digits and nails abnormal - arthritic changes present; Examination of  right lower extremity  Inspection, ROM, stability and muscle strength decreased 2/2 hip fx  SKIN:  Palpation of skin and subcutaneous tissue baseline, Inspection of skin and subcutaneous tissue abnormal, R hip incision C/D/I with staples - no s/sx of infection  NEURO:   Cranial nerves 2-12 are normal tested and grossly at patient's baseline  PSYCH:  oriented to slef, insight and judgement impaired, memory impaired, affect and mood normal     Recent Labs:   CBC RESULTS:   Recent Labs   Lab Test  18   0554   03/22/18   0630  03/21/18   0701  03/20/18   2110  03/19/18   2156  07/05/17   1145   WBC   --    --    --    --   9.8  6.0   RBC   --    --    --    --   4.10  4.66   HGB   --   10.9*  11.6*   --   12.5  14.5   HCT   --    --    --    --   37.7  43.7   MCV   --    --    --    --   92  94   MCH   --    --    --    --   30.5  31.1   MCHC   --    --    --    --   33.2  33.2   RDW   --    --    --    --   12.5  13.1   PLT  264   --    --   237  250  242       Last Basic Metabolic Panel:  Recent Labs   Lab Test  03/24/18   0659  03/22/18   0630  03/21/18   0701   NA   --   141  138   POTASSIUM   --   3.8  4.2   CHLORIDE   --   109  106   AWILDA   --   8.0*  7.9*   CO2   --   25  26   BUN   --   12  16   CR  0.57  0.72  0.74   GLC   --   99  92       Liver Function Studies -   Recent Labs   Lab Test  07/05/17   1145  03/10/17   1033   PROTTOTAL  7.2  7.1   ALBUMIN  3.9  4.0   BILITOTAL  0.4  0.4   ALKPHOS  69  76   AST  20  16   ALT  19  21       TSH   Date Value Ref Range Status   03/10/2017 2.01 0.40 - 4.00 mU/L Final   02/23/2016 2.77 0.40 - 4.00 mU/L Final         Assessment/Plan:   Diagnosis Comments   1. Closed fracture of neck of right femur with routine healing  Ortho intervention as above  F/u per their recommendations  Continue Tylenol for pain control as ordered   2. History of right hip hemiarthroplasty  As above  Xarelto for DVT prophylaxis  Wound care as ordered   3. Alzheimer's dementia without behavioral disturbance, unspecified timing of dementia onset  Pleasantly confused  Continue scheduled medications as ordered   4. Hyperlipidemia LDL goal <100  Stable without medical intervention   5. Adjustment disorder with mixed anxiety and depressed mood  Stable without medical intervention   6. Physical deconditioning  PT/OT adv per their recommendations       Electronically signed by  BERNA Conrad CNP

## 2018-04-11 ENCOUNTER — TRANSFERRED RECORDS (OUTPATIENT)
Dept: HEALTH INFORMATION MANAGEMENT | Facility: CLINIC | Age: 78
End: 2018-04-11

## 2018-04-11 LAB
HEMOGLOBIN: 12.9 G/DL (ref 12–16)
HGB BLD-MCNC: 12.9 G/DL (ref 12–16)

## 2018-04-12 PROBLEM — R53.81 PHYSICAL DECONDITIONING: Status: ACTIVE | Noted: 2018-04-12

## 2018-04-12 PROBLEM — S72.001D CLOSED FRACTURE OF NECK OF RIGHT FEMUR WITH ROUTINE HEALING: Status: ACTIVE | Noted: 2018-03-20

## 2018-04-12 PROBLEM — Z96.641 HISTORY OF RIGHT HIP HEMIARTHROPLASTY: Status: ACTIVE | Noted: 2018-03-20

## 2018-04-12 PROBLEM — F43.23 ADJUSTMENT DISORDER WITH MIXED ANXIETY AND DEPRESSED MOOD: Status: ACTIVE | Noted: 2018-04-12

## 2018-04-17 ENCOUNTER — TRANSFERRED RECORDS (OUTPATIENT)
Dept: HEALTH INFORMATION MANAGEMENT | Facility: CLINIC | Age: 78
End: 2018-04-17

## 2018-04-17 ENCOUNTER — NURSING HOME VISIT (OUTPATIENT)
Dept: GERIATRICS | Facility: CLINIC | Age: 78
End: 2018-04-17
Payer: COMMERCIAL

## 2018-04-17 VITALS
OXYGEN SATURATION: 99 % | TEMPERATURE: 98.2 F | SYSTOLIC BLOOD PRESSURE: 122 MMHG | HEIGHT: 62 IN | HEART RATE: 82 BPM | BODY MASS INDEX: 18.07 KG/M2 | WEIGHT: 98.2 LBS | RESPIRATION RATE: 16 BRPM | DIASTOLIC BLOOD PRESSURE: 62 MMHG

## 2018-04-17 DIAGNOSIS — S72.001D CLOSED FRACTURE OF NECK OF RIGHT FEMUR WITH ROUTINE HEALING: Primary | ICD-10-CM

## 2018-04-17 DIAGNOSIS — Z96.641 HISTORY OF RIGHT HIP HEMIARTHROPLASTY: ICD-10-CM

## 2018-04-17 DIAGNOSIS — F43.23 ADJUSTMENT DISORDER WITH MIXED ANXIETY AND DEPRESSED MOOD: ICD-10-CM

## 2018-04-17 DIAGNOSIS — R53.81 PHYSICAL DECONDITIONING: ICD-10-CM

## 2018-04-17 DIAGNOSIS — G30.9 ALZHEIMER'S DEMENTIA WITHOUT BEHAVIORAL DISTURBANCE, UNSPECIFIED TIMING OF DEMENTIA ONSET: ICD-10-CM

## 2018-04-17 DIAGNOSIS — F02.80 ALZHEIMER'S DEMENTIA WITHOUT BEHAVIORAL DISTURBANCE, UNSPECIFIED TIMING OF DEMENTIA ONSET: ICD-10-CM

## 2018-04-17 DIAGNOSIS — E78.5 HYPERLIPIDEMIA LDL GOAL <100: ICD-10-CM

## 2018-04-17 PROCEDURE — 99309 SBSQ NF CARE MODERATE MDM 30: CPT | Performed by: NURSE PRACTITIONER

## 2018-04-17 NOTE — LETTER
4/17/2018        RE: Adriane Flores  [address] [id number]        [place] [facility]    Chief Complaint   Patient presents with     RECHECK       HPI:    Adriane Flores is a 78 year old  (1940), who is being seen today for an episodic care visit at [facility].  HPI information obtained from: facility chart records, facility staff, patient report and [facility] chart review.Today's concern is:  Closed fracture of neck of right femur with routine healing  History of right hip hemiarthroplasty  Likely occurred after a fall sustained 3d prior to presentation. No LOC with fall. Ortho consulted and she underwent a R bipolar hip hemiarthroplasty per Dr. Ellington on 3/20/18. Postop course was uneventful. Avoided narcotics given predisposition to delirium with her dementia. Her pain was managed with sched Tylenol (325mg TID), though she would intermittently refuse doses. Seen by therapy services postop, difficult time with participation given her advanced dementia. Recommended discharge to TCU. Discharge on Xarelto for DVT ppx. Will need to be monitored closely at TCU while on anticoagulation given her fall risk.  Patient denies pain today  PT/OT following  Tylenol for pain as noted above - has not requested/noted need for stronger pain control regimen since admission  Xarelto for anticoagulation  Staff is attaining Xrays/photos of incision today to communicate with Ortho for patient's f/u to prevent her having to be taken from the facility.    Alzheimer's dementia without behavioral disturbance, unspecified timing of dementia onset  Prior to admission she had been residing with her  at home. Has a caregiver 6d/wk (exept for Sundays from 8-5).   Kept on PTA meds this stay, including Namenda, Aricept, Wellbutrin and prn Ativan, though some formulations were changed from long acting to short acting as she was requiring her meds to be crushed and  given with applesauce.   PRN Seroquel ordered to help manage agitation/insomnia -- had minimal need during postop period  Discharged to memory care TCU for ongoing care.  Chronic; progressive; continues to require 24-hr supportive cares  Patient very confused at baseline - responds with simple short answers  Does live at home with her  prior to fall  On regimen of Namenda, Aricept, Seroquel and Ativan  Has not had behaviors since admission - has not required use of Ativan or Seroquel    Hyperlipidemia LDL goal <100  Chronic  No active tx regimen  Recent Labs   Lab Test  03/10/17   1033  02/23/16   0917  02/18/15   0837  02/03/14   0954   CHOL  214*  226*  219*  202*   HDL  53  70  68  55   LDL  128*  139*  136*  129   TRIG  166*  84  74  88   CHOLHDLRATIO   --    --   3.2  3.7     Adjustment disorder with mixed anxiety and depressed mood  No active tx regimen   Patient does not display any active s/sx of current depression - very happy in demeanor and pleasant with conversation, smiling frequently    Physical deconditioning  2/2 hospitalization and above noted diagnoses  PT/OT following    ALLERGIES: Sulfa drugs  Past Medical, Surgical, Family and Social History reviewed and updated in [employer].    Current Outpatient Prescriptions   Medication Sig Dispense Refill     acetaminophen (TYLENOL) 500 MG tablet Take 1,000 mg by mouth 3 times daily       memantine XR (NAMENDA XR) 28 MG 24 hr capsule Take 1 capsule (28 mg) by mouth daily       QUEtiapine (SEROQUEL) 25 MG tablet Take 0.5 tablets (12.5 mg) by mouth 2 times daily as needed (give for severe agitation/at danger to self/others during the day, or for persistent insomina, mild agitation at night.) 60 tablet      LORazepam (ATIVAN) 0.5 MG tablet Take 1 tablet (0.5 mg) by mouth every 8 hours as needed for anxiety 30 tablet 0     rivaroxaban ANTICOAGULANT (XARELTO) 10 MG TABS tablet Take 1 tablet (10 mg) by mouth daily (with dinner) 21 tablet 0     donepezil  "(ARICEPT) 5 MG tablet Take 1 tablet (5 mg) by mouth At Bedtime 90 tablet 3     Medications reviewed:  Medications reconciled to facility chart and changes were made to reflect current medications as identified as above med list. Below are the changes that were made:   Medications stopped since last EPIC medication reconciliation:   There are no discontinued medications.    Medications started since last [facility] medication reconciliation:  No orders of the defined types were placed in this encounter.    REVIEW OF SYSTEMS:  4 point ROS including Respiratory, CV, GI and , other than that noted in the HPI,  is negative    Physical Exam:  /62  Pulse 82  Temp 98.2  F (36.8  C)  Resp 16  Ht 5' 2\" (1.575 m)  Wt 98 lb 3.2 oz (44.5 kg)  SpO2 99%  BMI 17.96 kg/m2  GENERAL APPEARANCE:  Alert, in no distress, appears healthy, thin, cooperative, pleasantly confused woman appearing younger than noted age watching television with other residents in day room  ENT:  Mouth and posterior oropharynx normal, moist mucous membranes, normal hearing acuity  EYES:  EOM, conjunctivae, lids, pupils and irises normal  NECK:  No adenopathy, masses or thyromegaly  RESP:  Respiratory effort and palpation of chest normal, lungs clear to auscultation, no respiratory distress  CV:  Palpation and auscultation of heart done, regular rate and rhythm, no murmur, rub, or gallop, no edema, +2 pedal pulses  ABDOMEN: Normal bowel sounds, soft, nontender, no hepatosplenomegaly or other masses  M/S:   Gait and station abnormal - GABBY 2/2 patient being in w/c; Digits and nails abnormal - arthritic changes present; Examination of right lower extremity  Inspection, ROM, stability and muscle strength decreased 2/2 hip fx  SKIN:  Palpation of skin and subcutaneous tissue baseline, Inspection of skin and subcutaneous tissue abnormal, R hip incision - GABBY today 2/2 to being in w/c  NEURO:   Cranial nerves 2-12 are normal tested and grossly at patient's " baseline  PSYCH:  oriented to slef, insight and judgement impaired, memory impaired, affect and mood normal     Last 3 BPs: 122/62, 135/84, 146/82 mmHg    Recent Labs:     CBC RESULTS:   Recent Labs   Lab Test  03/23/18   0554  03/22/18   0630  03/21/18   0701  03/20/18   2110  03/19/18   2156  07/05/17   1145   WBC   --    --    --    --   9.8  6.0   RBC   --    --    --    --   4.10  4.66   HGB   --   10.9*  11.6*   --   12.5  14.5   HCT   --    --    --    --   37.7  43.7   MCV   --    --    --    --   92  94   MCH   --    --    --    --   30.5  31.1   MCHC   --    --    --    --   33.2  33.2   RDW   --    --    --    --   12.5  13.1   PLT  264   --    --   237  250  242       Last Basic Metabolic Panel:  Recent Labs   Lab Test  03/24/18   0659  03/22/18   0630  03/21/18   0701   NA   --   141  138   POTASSIUM   --   3.8  4.2   CHLORIDE   --   109  106   AWILDA   --   8.0*  7.9*   CO2   --   25  26   BUN   --   12  16   CR  0.57  0.72  0.74   GLC   --   99  92       Assessment/Plan:   Diagnosis Comments   1. Closed fracture of neck of right femur with routine healing  Ortho intervention as above  F/u remotely today as noted above  Continue Tylenol for pain control as ordered   2. History of right hip hemiarthroplasty  As above  Xarelto for DVT prophylaxis  Wound care as ordered   3. Alzheimer's dementia without behavioral disturbance, unspecified timing of dementia onset  Pleasantly confused  Continue scheduled medications as ordered   4. Hyperlipidemia LDL goal <100  Stable without medical intervention   5. Adjustment disorder with mixed anxiety and depressed mood  Stable without medical intervention   6. Physical deconditioning  PT/OT adv per their recommendations     Electronically signed by  BERNA Conrad, JUDIE  [facility]      Sincerely,        BERNA Conrad CNP

## 2018-04-24 ENCOUNTER — NURSING HOME VISIT (OUTPATIENT)
Dept: GERIATRICS | Facility: CLINIC | Age: 78
End: 2018-04-24
Payer: COMMERCIAL

## 2018-04-24 VITALS
OXYGEN SATURATION: 97 % | TEMPERATURE: 98.2 F | RESPIRATION RATE: 18 BRPM | BODY MASS INDEX: 18.35 KG/M2 | WEIGHT: 100.3 LBS | SYSTOLIC BLOOD PRESSURE: 164 MMHG | DIASTOLIC BLOOD PRESSURE: 66 MMHG | HEART RATE: 78 BPM

## 2018-04-24 DIAGNOSIS — S72.001D CLOSED FRACTURE OF NECK OF RIGHT FEMUR WITH ROUTINE HEALING: Primary | ICD-10-CM

## 2018-04-24 DIAGNOSIS — R53.81 PHYSICAL DECONDITIONING: ICD-10-CM

## 2018-04-24 DIAGNOSIS — E78.5 HYPERLIPIDEMIA LDL GOAL <100: ICD-10-CM

## 2018-04-24 DIAGNOSIS — Z96.641 HISTORY OF RIGHT HIP HEMIARTHROPLASTY: ICD-10-CM

## 2018-04-24 DIAGNOSIS — G30.9 ALZHEIMER'S DEMENTIA WITHOUT BEHAVIORAL DISTURBANCE, UNSPECIFIED TIMING OF DEMENTIA ONSET: ICD-10-CM

## 2018-04-24 DIAGNOSIS — F43.23 ADJUSTMENT DISORDER WITH MIXED ANXIETY AND DEPRESSED MOOD: ICD-10-CM

## 2018-04-24 DIAGNOSIS — F02.80 ALZHEIMER'S DEMENTIA WITHOUT BEHAVIORAL DISTURBANCE, UNSPECIFIED TIMING OF DEMENTIA ONSET: ICD-10-CM

## 2018-04-24 PROCEDURE — 99309 SBSQ NF CARE MODERATE MDM 30: CPT | Performed by: NURSE PRACTITIONER

## 2018-04-24 NOTE — LETTER
4/24/2018        RE: Adriane Flores  [address] [facility] [address] [address]        [place] [facility]    Chief Complaint   Patient presents with     RECHECK       HPI:    Adriane Flores is a 78 year old  (1940), who is being seen today for an episodic care visit at [facility].  HPI information obtained from: facility chart records, facility staff and patient report.    Today's concern is:  Closed fracture of neck of right femur with routine healing  History of right hip hemiarthroplasty  Likely occurred after a fall sustained 3d prior to presentation. No LOC with fall. Ortho consulted and she underwent a R bipolar hip hemiarthroplasty per Dr. Ellington on 3/20/18. Postop course was uneventful. Avoided narcotics given predisposition to delirium with her dementia. Her pain was managed with sched Tylenol (325mg TID), though she would intermittently refuse doses. Seen by therapy services postop, difficult time with participation given her advanced dementia. Recommended discharge to TCU. Discharge on Xarelto for DVT ppx. Will need to be monitored closely at TCU while on anticoagulation given her fall risk.  Patient denies pain today  PT/OT following  Tylenol for pain as noted above - has not requested/noted need for stronger pain control regimen since admission  Xarelto for anticoagulation  F/u with Ortho pending - awaiting final response to remove patient's staples - continue with therapies as ordered - patient much more mobile today - fell this AM (was found sitting on floor in room) is seen on unit attempting to get out of w/c repeatedly    Alzheimer's dementia without behavioral disturbance, unspecified timing of dementia onset  Prior to admission she had been residing with her  at home. Has a caregiver 6d/wk (exept for Sundays from 8-5).   Kept on PTA meds this stay, including Namenda, Aricept, Wellbutrin and prn Ativan, though some formulations were changed  from long acting to short acting as she was requiring her meds to be crushed and given with applesauce.   PRN Seroquel ordered to help manage agitation/insomnia -- had minimal need during postop period  Discharged to memory care TCU for ongoing care.  Chronic; progressive; continues to require 24-hr supportive cares  Patient very confused at baseline - responds with simple short answers  Does live at home with her  prior to fall  On regimen of Namenda, Aricept, Seroquel and Ativan  Has not had behaviors since admission - has not required use of Ativan or Seroquel    Hyperlipidemia LDL goal <100  Chronic  No active tx regimen  Recent Labs   Lab Test  03/10/17   1033  02/23/16   0917  02/18/15   0837  02/03/14   0954   CHOL  214*  226*  219*  202*   HDL  53  70  68  55   LDL  128*  139*  136*  129   TRIG  166*  84  74  88   CHOLHDLRATIO   --    --   3.2  3.7     Adjustment disorder with mixed anxiety and depressed mood  No active tx regimen   Patient does not display any active s/sx of current depression - very happy in demeanor and pleasant with conversation, smiling frequently    Physical deconditioning  2/2 hospitalization and above noted diagnoses  PT/OT following    ALLERGIES: Sulfa drugs  Past Medical, Surgical, Family and Social History reviewed and updated in [employer].    Current Outpatient Prescriptions   Medication Sig Dispense Refill     acetaminophen (TYLENOL) 500 MG tablet Take 1,000 mg by mouth 3 times daily       donepezil (ARICEPT) 5 MG tablet Take 1 tablet (5 mg) by mouth At Bedtime 90 tablet 3     LORazepam (ATIVAN) 0.5 MG tablet Take 1 tablet (0.5 mg) by mouth every 8 hours as needed for anxiety 30 tablet 0     memantine XR (NAMENDA XR) 28 MG 24 hr capsule Take 1 capsule (28 mg) by mouth daily       QUEtiapine (SEROQUEL) 25 MG tablet Take 0.5 tablets (12.5 mg) by mouth 2 times daily as needed (give for severe agitation/at danger to self/others during the day, or for persistent insomina, mild  "agitation at night.) 60 tablet      rivaroxaban ANTICOAGULANT (XARELTO) 10 MG TABS tablet Take 1 tablet (10 mg) by mouth daily (with dinner) 21 tablet 0     Medications reviewed:  Medications reconciled to facility chart and changes were made to reflect current medications as identified as above med list. Below are the changes that were made:   Medications stopped since last EPIC medication reconciliation:   There are no discontinued medications.    Medications started since last [facility] medication reconciliation:  No orders of the defined types were placed in this encounter.    REVIEW OF SYSTEMS:  Limited secondary to cognitive impairment but today pt reports \"I'm good\" with a smile    Physical Exam:  /66  Pulse 78  Temp 98.2  F (36.8  C)  Resp 18  Wt 100 lb 4.8 oz (45.5 kg)  SpO2 97%  BMI 18.35 kg/m2  GENERAL APPEARANCE:  Alert, in no distress, appears healthy, thin, cooperative, pleasantly confused woman appearing younger than noted age moving throughout unit in w/c independently - standing from w/c independently repeatedly  ENT:  Mouth and posterior oropharynx normal, moist mucous membranes, normal hearing acuity  EYES:  EOM, conjunctivae, lids, pupils and irises normal  NECK:  No adenopathy, masses or thyromegaly  RESP:  Respiratory effort and palpation of chest normal, lungs clear to auscultation, no respiratory distress  CV:  Palpation and auscultation of heart done, regular rate and rhythm, no murmur, rub, or gallop, no edema, +2 pedal pulses  ABDOMEN: Normal bowel sounds, soft, nontender, no hepatosplenomegaly or other masses  M/S:   Gait and station abnormal - GABBY 2/2 patient being in w/c; Digits and nails abnormal - arthritic changes present; Examination of right lower extremity  Inspection, ROM, stability and muscle strength increasing with therapies  SKIN:  Palpation of skin and subcutaneous tissue baseline, Inspection of skin and subcutaneous tissue abnormal, R hip incision - GABBY today 2/2 to " being in w/c  NEURO:   Cranial nerves 2-12 are normal tested and grossly at patient's baseline  PSYCH:  oriented to slef, insight and judgement impaired, memory impaired, affect and mood normal     BP Readin/66  118/76  152/76    Pulse Readings from Last 4 Encounters:   18 74   18 82   04/10/18 70   18 79       Recent Labs:   CBC RESULTS:   Recent Labs   Lab Test  18   0554  18   0630  18   0701  18   2110  18   2156  17   1145   WBC   --    --    --    --   9.8  6.0   RBC   --    --    --    --   4.10  4.66   HGB   --   10.9*  11.6*   --   12.5  14.5   HCT   --    --    --    --   37.7  43.7   MCV   --    --    --    --   92  94   MCH   --    --    --    --   30.5  31.1   MCHC   --    --    --    --   33.2  33.2   RDW   --    --    --    --   12.5  13.1   PLT  264   --    --   237  250  242       Last Basic Metabolic Panel:  Recent Labs   Lab Test  18   0659  18   0630  18   0701   NA   --   141  138   POTASSIUM   --   3.8  4.2   CHLORIDE   --   109  106   AWILDA   --   8.0*  7.9*   CO2   --   25  26   BUN   --   12  16   CR  0.57  0.72  0.74   GLC   --   99  92       Liver Function Studies -   Recent Labs   Lab Test  17   1145  03/10/17   1033   PROTTOTAL  7.2  7.1   ALBUMIN  3.9  4.0   BILITOTAL  0.4  0.4   ALKPHOS  69  76   AST  20  16   ALT  19  21       TSH   Date Value Ref Range Status   03/10/2017 2.01 0.40 - 4.00 mU/L Final   2016 2.77 0.40 - 4.00 mU/L Final         Assessment/Plan:   Diagnosis Comments   1. Closed fracture of neck of right femur with routine healing  Ortho intervention as above  F/u remotely today as noted above - await orders for staple removal per Ortho  Continue Tylenol for pain control as ordered  Close monitoring with increased mobility - staff doing well with redirection and behavioral interventions   2. History of right hip hemiarthroplasty  As above  Xarelto for DVT prophylaxis  Wound care as  ordered   3. Alzheimer's dementia without behavioral disturbance, unspecified timing of dementia onset  Pleasantly confused  Continue scheduled medications as ordered   4. Hyperlipidemia LDL goal <100  Stable without medical intervention   5. Adjustment disorder with mixed anxiety and depressed mood  Stable without medical intervention   6. Physical deconditioning  PT/OT adv per their recommendations       Electronically signed by  BERNA Conrad CNP                      Sincerely,        BERNA Conrad CNP

## 2018-04-24 NOTE — PROGRESS NOTES
[facility]    Chief Complaint   Patient presents with     RECHECK       HPI:    Adriane Flores is a 78 year old  (1940), who is being seen today for an episodic care visit at [facility].  HPI information obtained from: facility chart records, facility staff and patient report.    Today's concern is:  Closed fracture of neck of right femur with routine healing  History of right hip hemiarthroplasty  Likely occurred after a fall sustained 3d prior to presentation. No LOC with fall. Ortho consulted and she underwent a R bipolar hip hemiarthroplasty per Dr. Ellington on 3/20/18. Postop course was uneventful. Avoided narcotics given predisposition to delirium with her dementia. Her pain was managed with sched Tylenol (325mg TID), though she would intermittently refuse doses. Seen by therapy services postop, difficult time with participation given her advanced dementia. Recommended discharge to TCU. Discharge on Xarelto for DVT ppx. Will need to be monitored closely at TCU while on anticoagulation given her fall risk.  Patient denies pain today  PT/OT following  Tylenol for pain as noted above - has not requested/noted need for stronger pain control regimen since admission  Xarelto for anticoagulation  F/u with Ortho pending - awaiting final response to remove patient's staples - continue with therapies as ordered - patient much more mobile today - fell this AM (was found sitting on floor in room) is seen on unit attempting to get out of w/c repeatedly    Alzheimer's dementia without behavioral disturbance, unspecified timing of dementia onset  Prior to admission she had been residing with her  at home. Has a caregiver 6d/wk (exept for Sundays from 8-5).   Kept on PTA meds this stay, including Namenda, Aricept, Wellbutrin and prn Ativan, though some formulations were changed from long acting to short acting as she was requiring her meds to be crushed and given with  applesauce.   PRN Seroquel ordered to help manage agitation/insomnia -- had minimal need during postop period  Discharged to memory care TCU for ongoing care.  Chronic; progressive; continues to require 24-hr supportive cares  Patient very confused at baseline - responds with simple short answers  Does live at home with her  prior to fall  On regimen of Namenda, Aricept, Seroquel and Ativan  Has not had behaviors since admission - has not required use of Ativan or Seroquel    Hyperlipidemia LDL goal <100  Chronic  No active tx regimen  Recent Labs   Lab Test  03/10/17   1033  02/23/16   0917  02/18/15   0837  02/03/14   0954   CHOL  214*  226*  219*  202*   HDL  53  70  68  55   LDL  128*  139*  136*  129   TRIG  166*  84  74  88   CHOLHDLRATIO   --    --   3.2  3.7     Adjustment disorder with mixed anxiety and depressed mood  No active tx regimen   Patient does not display any active s/sx of current depression - very happy in demeanor and pleasant with conversation, smiling frequently    Physical deconditioning  2/2 hospitalization and above noted diagnoses  PT/OT following    ALLERGIES: Sulfa drugs  Past Medical, Surgical, Family and Social History reviewed and updated in [facility].    Current Outpatient Prescriptions   Medication Sig Dispense Refill     acetaminophen (TYLENOL) 500 MG tablet Take 1,000 mg by mouth 3 times daily       donepezil (ARICEPT) 5 MG tablet Take 1 tablet (5 mg) by mouth At Bedtime 90 tablet 3     LORazepam (ATIVAN) 0.5 MG tablet Take 1 tablet (0.5 mg) by mouth every 8 hours as needed for anxiety 30 tablet 0     memantine XR (NAMENDA XR) 28 MG 24 hr capsule Take 1 capsule (28 mg) by mouth daily       QUEtiapine (SEROQUEL) 25 MG tablet Take 0.5 tablets (12.5 mg) by mouth 2 times daily as needed (give for severe agitation/at danger to self/others during the day, or for persistent insomina, mild agitation at night.) 60 tablet      rivaroxaban ANTICOAGULANT (XARELTO) 10 MG TABS tablet Take 1  "tablet (10 mg) by mouth daily (with dinner) 21 tablet 0     Medications reviewed:  Medications reconciled to facility chart and changes were made to reflect current medications as identified as above med list. Below are the changes that were made:   Medications stopped since last EPIC medication reconciliation:   There are no discontinued medications.    Medications started since last [facility] medication reconciliation:  No orders of the defined types were placed in this encounter.    REVIEW OF SYSTEMS:  Limited secondary to cognitive impairment but today pt reports \"I'm good\" with a smile    Physical Exam:  /66  Pulse 78  Temp 98.2  F (36.8  C)  Resp 18  Wt 100 lb 4.8 oz (45.5 kg)  SpO2 97%  BMI 18.35 kg/m2  GENERAL APPEARANCE:  Alert, in no distress, appears healthy, thin, cooperative, pleasantly confused woman appearing younger than noted age moving throughout unit in w/c independently - standing from w/c independently repeatedly  ENT:  Mouth and posterior oropharynx normal, moist mucous membranes, normal hearing acuity  EYES:  EOM, conjunctivae, lids, pupils and irises normal  NECK:  No adenopathy, masses or thyromegaly  RESP:  Respiratory effort and palpation of chest normal, lungs clear to auscultation, no respiratory distress  CV:  Palpation and auscultation of heart done, regular rate and rhythm, no murmur, rub, or gallop, no edema, +2 pedal pulses  ABDOMEN: Normal bowel sounds, soft, nontender, no hepatosplenomegaly or other masses  M/S:   Gait and station abnormal - GABBY 2/2 patient being in w/c; Digits and nails abnormal - arthritic changes present; Examination of right lower extremity  Inspection, ROM, stability and muscle strength increasing with therapies  SKIN:  Palpation of skin and subcutaneous tissue baseline, Inspection of skin and subcutaneous tissue abnormal, R hip incision - GABBY today 2/2 to being in w/c  NEURO:   Cranial nerves 2-12 are normal tested and grossly at patient's " baseline  PSYCH:  oriented to slef, insight and judgement impaired, memory impaired, affect and mood normal     BP Readin/66  118/76  152/76    Pulse Readings from Last 4 Encounters:   18 74   18 82   04/10/18 70   18 79       Recent Labs:   CBC RESULTS:   Recent Labs   Lab Test  18   0554  18   0630  18   0701  18   2110  18   2156  17   1145   WBC   --    --    --    --   9.8  6.0   RBC   --    --    --    --   4.10  4.66   HGB   --   10.9*  11.6*   --   12.5  14.5   HCT   --    --    --    --   37.7  43.7   MCV   --    --    --    --   92  94   MCH   --    --    --    --   30.5  31.1   MCHC   --    --    --    --   33.2  33.2   RDW   --    --    --    --   12.5  13.1   PLT  264   --    --   237  250  242       Last Basic Metabolic Panel:  Recent Labs   Lab Test  18   0659  18   0630  18   0701   NA   --   141  138   POTASSIUM   --   3.8  4.2   CHLORIDE   --   109  106   AWILDA   --   8.0*  7.9*   CO2   --   25  26   BUN   --   12  16   CR  0.57  0.72  0.74   GLC   --   99  92       Liver Function Studies -   Recent Labs   Lab Test  17   1145  03/10/17   1033   PROTTOTAL  7.2  7.1   ALBUMIN  3.9  4.0   BILITOTAL  0.4  0.4   ALKPHOS  69  76   AST  20  16   ALT  19  21       TSH   Date Value Ref Range Status   03/10/2017 2.01 0.40 - 4.00 mU/L Final   2016 2.77 0.40 - 4.00 mU/L Final         Assessment/Plan:   Diagnosis Comments   1. Closed fracture of neck of right femur with routine healing  Ortho intervention as above  F/u remotely today as noted above - await orders for staple removal per Ortho  Continue Tylenol for pain control as ordered  Close monitoring with increased mobility - staff doing well with redirection and behavioral interventions   2. History of right hip hemiarthroplasty  As above  Xarelto for DVT prophylaxis  Wound care as ordered   3. Alzheimer's dementia without behavioral disturbance, unspecified timing of  dementia onset  Pleasantly confused  Continue scheduled medications as ordered   4. Hyperlipidemia LDL goal <100  Stable without medical intervention   5. Adjustment disorder with mixed anxiety and depressed mood  Stable without medical intervention   6. Physical deconditioning  PT/OT adv per their recommendations       Electronically signed by  BERNA Conrad CNP

## 2018-04-30 ENCOUNTER — NURSING HOME VISIT (OUTPATIENT)
Dept: GERIATRICS | Facility: CLINIC | Age: 78
End: 2018-04-30
Payer: COMMERCIAL

## 2018-04-30 VITALS
OXYGEN SATURATION: 97 % | WEIGHT: 101.9 LBS | TEMPERATURE: 97.8 F | DIASTOLIC BLOOD PRESSURE: 77 MMHG | RESPIRATION RATE: 16 BRPM | BODY MASS INDEX: 18.64 KG/M2 | SYSTOLIC BLOOD PRESSURE: 147 MMHG | HEART RATE: 65 BPM

## 2018-04-30 DIAGNOSIS — G30.9 ALZHEIMER'S DEMENTIA WITHOUT BEHAVIORAL DISTURBANCE, UNSPECIFIED TIMING OF DEMENTIA ONSET: ICD-10-CM

## 2018-04-30 DIAGNOSIS — E78.5 HYPERLIPIDEMIA LDL GOAL <100: ICD-10-CM

## 2018-04-30 DIAGNOSIS — R53.81 PHYSICAL DECONDITIONING: ICD-10-CM

## 2018-04-30 DIAGNOSIS — F02.80 ALZHEIMER'S DEMENTIA WITHOUT BEHAVIORAL DISTURBANCE, UNSPECIFIED TIMING OF DEMENTIA ONSET: ICD-10-CM

## 2018-04-30 DIAGNOSIS — S72.001D CLOSED FRACTURE OF NECK OF RIGHT FEMUR WITH ROUTINE HEALING: Primary | ICD-10-CM

## 2018-04-30 DIAGNOSIS — Z96.641 HISTORY OF RIGHT HIP HEMIARTHROPLASTY: ICD-10-CM

## 2018-04-30 DIAGNOSIS — F43.23 ADJUSTMENT DISORDER WITH MIXED ANXIETY AND DEPRESSED MOOD: ICD-10-CM

## 2018-04-30 PROCEDURE — 99309 SBSQ NF CARE MODERATE MDM 30: CPT | Performed by: NURSE PRACTITIONER

## 2018-04-30 NOTE — MR AVS SNAPSHOT
"              After Visit Summary   4/30/2018    Adriane Flores    MRN: [id number]           Patient Information     Date Of Birth          1940        Visit Information        Provider Department      4/30/2018 9:30 AM Jerri Carballo APRN CNP [facility]        Today's Diagnoses     Closed fracture of neck of right femur with routine healing    -  1    History of right hip hemiarthroplasty        Physical deconditioning        Hyperlipidemia LDL goal <100        Alzheimer's dementia without behavioral disturbance, unspecified timing of dementia onset        Adjustment disorder with mixed anxiety and depressed mood           Follow-ups after your visit        Who to contact     If you have questions or need follow up information about today's clinic visit or your schedule please contact [facility] directly at [phone].  Normal or non-critical lab and imaging results will be communicated to you by [email or web address], letter or phone within 4 business days after the clinic has received the results. If you do not hear from us within 7 days, please contact the clinic through [email or web address] or phone. If you have a critical or abnormal lab result, we will notify you by phone as soon as possible.  Submit refill requests through [employer] Mentem or call your pharmacy and they will forward the refill request to us. Please allow 3 business days for your refill to be completed.          Additional Information About Your Visit        [email or web address] Information     Secure Mentem lets you send messages to your doctor, view your test results, renew your prescriptions, schedule appointments and more. To sign up, go to [email or web address] . Click on \"Log in\" on the left side of the screen, which will take you to the Welcome page. Then click on \"Sign up Now\" on the right side of the page.     You will be asked to enter the access code listed below, as well as some personal information. Please follow the directions to " create your username and password.     Your access code is: [email or web address]  Expires: 2018  6:54 PM     Your access code will  in 90 days. If you need help or a new code, please call your [place] clinic or [phone].        Care EveryWhere ID     This is your Care EveryWhere ID. This could be used by other organizations to access your [place] medical records  [id number]        Your Vitals Were     Pulse Temperature Respirations Pulse Oximetry BMI (Body Mass Index)       65 97.8  F (36.6  C) 16 97% 18.64 kg/m2        Blood Pressure from Last 3 Encounters:   18 147/77   18 164/66   18 122/62    Weight from Last 3 Encounters:   18 101 lb 14.4 oz (46.2 kg)   18 100 lb 4.8 oz (45.5 kg)   18 98 lb 3.2 oz (44.5 kg)              Today, you had the following     No orders found for display       Primary Care Provider Office Phone # Fax #    Saul Wheeler [phone]       [address] [facility] [address]        Equal Access to Services     ESTEBAN [facility] AH: Hadii aad ku hadasho Soomaali, waaxda luqadaha, qaybta kaalmada adeegyada, marco tilley hayestellan les gramajo . So [facility] [phone].    ATENCIÓN: Si habla español, tiene a chris disposición servicios gratuitos de asistencia lingüística. Llame al [phone].    We comply with applicable federal civil rights laws and [place] laws. We do not discriminate on the basis of race, color, national origin, age, disability, sex, sexual orientation, or gender identity.            Thank you!     Thank you for choosing [facility]  for your care. Our goal is always to provide you with excellent care. Hearing back from our patients is one way we can continue to improve our services. Please take a few minutes to complete the written survey that you may receive in the mail after your visit with us. Thank you!             Your Updated Medication List - Protect others around you: Learn how  to safely use, store and throw away your medicines at www.disposemymeds.org.          This list is accurate as of 4/30/18 11:47 AM.  Always use your most recent med list.                   Brand Name Dispense Instructions for use Diagnosis    acetaminophen 500 MG tablet    TYLENOL     Take 1,000 mg by mouth 3 times daily        donepezil 5 MG tablet    ARICEPT    90 tablet    Take 1 tablet (5 mg) by mouth At Bedtime    Need for prophylactic vaccination and inoculation against influenza       LORazepam 0.5 MG tablet    ATIVAN    30 tablet    Take 1 tablet (0.5 mg) by mouth every 8 hours as needed for anxiety    Closed fracture of neck of right femur, initial encounter (H)       memantine XR 28 MG 24 hr capsule    NAMENDA XR     Take 1 capsule (28 mg) by mouth daily    Alzheimer's dementia without behavioral disturbance, unspecified timing of dementia onset       QUEtiapine 25 MG tablet    SEROquel    60 tablet    Take 0.5 tablets (12.5 mg) by mouth 2 times daily as needed (give for severe agitation/at danger to self/others during the day, or for persistent insomina, mild agitation at night.)    Alzheimer's dementia without behavioral disturbance, unspecified timing of dementia onset       rivaroxaban ANTICOAGULANT 10 MG Tabs tablet    XARELTO    21 tablet    Take 1 tablet (10 mg) by mouth daily (with dinner)    Closed fracture of neck of right femur, initial encounter (H)

## 2018-04-30 NOTE — PROGRESS NOTES
[facility]    Chief Complaint   Patient presents with     ELENA       [facility] Medical Record Number:  [id number]    HPI:    Adriane Flores is a 78 year old  (1940), who is being seen today for an episodic care visit at [facility].  HPI information obtained from: facility chart records, facility staff and patient report.    Today's concern is:  Closed fracture of neck of right femur with routine healing  History of right hip hemiarthroplasty  Physical deconditioning  Up with assist, continues therapies. Does not appear to be in pain - on scheduled Tylenol TID 1000 mg PO. Continues Xarelto for DVT prophylaxis x 3 weeks, no s/s bleeding or bruising. Recent BP Readin-164 /  and HR:  65-98    Hyperlipidemia LDL goal <100  Not currently treated with meds.  Lab Results   Component Value Date     03/10/2017       Alzheimer's dementia without behavioral disturbance, unspecified timing of dementia onset  Adjustment disorder with mixed anxiety and depressed mood  Patient on memory care TCU unit. No reports of increase in behaviors. Managed with Aricept, Ativan PRN anxiety, Namenda and PRN Seroquel. Very pleasantly confused today.       ALLERGIES: Sulfa drugs  Past Medical, Surgical, Family and Social History reviewed and updated in [employer].    Current Outpatient Prescriptions   Medication Sig Dispense Refill     acetaminophen (TYLENOL) 500 MG tablet Take 1,000 mg by mouth 3 times daily       donepezil (ARICEPT) 5 MG tablet Take 1 tablet (5 mg) by mouth At Bedtime 90 tablet 3     LORazepam (ATIVAN) 0.5 MG tablet Take 1 tablet (0.5 mg) by mouth every 8 hours as needed for anxiety 30 tablet 0     memantine XR (NAMENDA XR) 28 MG 24 hr capsule Take 1 capsule (28 mg) by mouth daily       QUEtiapine (SEROQUEL) 25 MG tablet Take 0.5 tablets (12.5 mg) by mouth 2 times daily as needed (give for severe agitation/at danger to self/others during the day, or for persistent  insomina, mild agitation at night.) 60 tablet      rivaroxaban ANTICOAGULANT (XARELTO) 10 MG TABS tablet Take 1 tablet (10 mg) by mouth daily (with dinner) 21 tablet 0     Medications reviewed:  Medications reconciled to facility chart and changes were made to reflect current medications as identified as above med list. Below are the changes that were made:   Medications stopped since last EPIC medication reconciliation:   There are no discontinued medications.    Medications started since last [facility] medication reconciliation:  No orders of the defined types were placed in this encounter.    REVIEW OF SYSTEMS:  Unobtainable secondary to cognitive impairment.     Physical Exam:  /77  Pulse 65  Temp 97.8  F (36.6  C)  Resp 16  Wt 101 lb 14.4 oz (46.2 kg)  SpO2 97%  BMI 18.64 kg/m2  GENERAL APPEARANCE:  Alert, in no distress, pleasant, cooperative, orientation unable to assess  EYES:  EOM, lids, pupils and irises normal, sclera clear and conjunctiva normal, no discharge or mattering on lids or lashes noted  ENT:  Mouth normal, moist mucous membranes, nose normal without drainage or crusting, external ears without lesions  NECK:  Nontender, supple, symmetrical  RESP:  respiratory effort and palpation of chest normal, no chest wall tenderness, no respiratory distress, Lung sounds clear, patient is on room air  CV:  Palpation and auscultation of heart done, rate and rhythm controlled, no murmur, no rub or gallop. Edema none  ABDOMEN:  normal bowel sounds, soft, nontender, no grimacing or guarding with palpation  M/S:   Gait and station unsafe without assistance, Digits and nails normal, no tenderness or swelling of the joints and moves all extremities  NEURO: speaks in word salad, no facial asymmetry, not able to follow directions  PSYCH:  insight and judgement and memory severely impaired; affect and mood normal     Recent Labs:   CBC RESULTS:   Recent Labs   Lab Test 04/11/18 03/23/18   0554  03/22/18   0630    03/20/18 2110 03/19/18 2156  07/05/17   1145   WBC   --    --    --    --    --   9.8  6.0   RBC   --    --    --    --    --   4.10  4.66   HGB  12.9   --   10.9*   < >   --   12.5  14.5   HCT   --    --    --    --    --   37.7  43.7   MCV   --    --    --    --    --   92  94   MCH   --    --    --    --    --   30.5  31.1   MCHC   --    --    --    --    --   33.2  33.2   RDW   --    --    --    --    --   12.5  13.1   PLT   --   264   --    --   237  250  242    < > = values in this interval not displayed.       Last Basic Metabolic Panel:  Recent Labs   Lab Test  03/24/18   0659  03/22/18   0630  03/21/18   0701   NA   --   141  138   POTASSIUM   --   3.8  4.2   CHLORIDE   --   109  106   AWILDA   --   8.0*  7.9*   CO2   --   25  26   BUN   --   12  16   CR  0.57  0.72  0.74   GLC   --   99  92       Liver Function Studies -   Recent Labs   Lab Test  07/05/17   1145  03/10/17   1033   PROTTOTAL  7.2  7.1   ALBUMIN  3.9  4.0   BILITOTAL  0.4  0.4   ALKPHOS  69  76   AST  20  16   ALT  19  21       TSH   Date Value Ref Range Status   03/10/2017 2.01 0.40 - 4.00 mU/L Final   02/23/2016 2.77 0.40 - 4.00 mU/L Final       Assessment/Plan:  (S72.001D) Closed fracture of neck of right femur with routine healing  (primary encounter diagnosis)  (Z96.641) History of right hip hemiarthroplasty  (R53.81) Physical deconditioning  Comment: acute onset with injury, surgery. Making gains in therapies, no pain  Plan: continue PT/OT and Tylenol as ordered. F/U with progress next week.     (G30.9,  F02.80) Alzheimer's dementia without behavioral disturbance, unspecified timing of dementia onset  (F43.23) Adjustment disorder with mixed anxiety and depressed mood  Comment: chronic issues; no recent behavior/mood changes. Meds as above.   Plan: continue meds; monitor for safety.     (E78.5) Hyperlipidemia LDL goal <100  Comment: diet controlled no meds.   Plan: f/u per primary care routine.     Orders:  No  changes    Electronically signed by  BERNA Messer CNP

## 2018-05-08 ENCOUNTER — NURSING HOME VISIT (OUTPATIENT)
Dept: GERIATRICS | Facility: CLINIC | Age: 78
End: 2018-05-08
Payer: COMMERCIAL

## 2018-05-08 VITALS
DIASTOLIC BLOOD PRESSURE: 67 MMHG | HEART RATE: 83 BPM | TEMPERATURE: 98.4 F | WEIGHT: 102.3 LBS | SYSTOLIC BLOOD PRESSURE: 156 MMHG | OXYGEN SATURATION: 97 % | RESPIRATION RATE: 20 BRPM | BODY MASS INDEX: 18.71 KG/M2

## 2018-05-08 DIAGNOSIS — Z96.641 HISTORY OF RIGHT HIP HEMIARTHROPLASTY: ICD-10-CM

## 2018-05-08 DIAGNOSIS — E78.5 HYPERLIPIDEMIA LDL GOAL <100: ICD-10-CM

## 2018-05-08 DIAGNOSIS — R53.81 PHYSICAL DECONDITIONING: ICD-10-CM

## 2018-05-08 DIAGNOSIS — F43.23 ADJUSTMENT DISORDER WITH MIXED ANXIETY AND DEPRESSED MOOD: ICD-10-CM

## 2018-05-08 DIAGNOSIS — G30.9 ALZHEIMER'S DEMENTIA WITHOUT BEHAVIORAL DISTURBANCE, UNSPECIFIED TIMING OF DEMENTIA ONSET: ICD-10-CM

## 2018-05-08 DIAGNOSIS — F02.80 ALZHEIMER'S DEMENTIA WITHOUT BEHAVIORAL DISTURBANCE, UNSPECIFIED TIMING OF DEMENTIA ONSET: ICD-10-CM

## 2018-05-08 DIAGNOSIS — S72.001D CLOSED FRACTURE OF NECK OF RIGHT FEMUR WITH ROUTINE HEALING: Primary | ICD-10-CM

## 2018-05-08 PROCEDURE — 99309 SBSQ NF CARE MODERATE MDM 30: CPT | Performed by: NURSE PRACTITIONER

## 2018-05-08 NOTE — LETTER
5/8/2018        RE: Adriane Flores  [address] [facility] [id number]        [facility]    Chief Complaint   Patient presents with     ELENA       [facility] Medical Record Number:  [id number]    HPI:    Adriane Flores is a 78 year old  (1940), who is being seen today for an episodic care visit at [facility].  HPI information obtained from: facility chart records, facility staff and patient report.    Today's concern is:  Closed fracture of neck of right femur with routine healing  History of right hip hemiarthroplasty  Likely occurred after a fall sustained 3d prior to presentation. No LOC with fall. Ortho consulted and she underwent a R bipolar hip hemiarthroplasty per Dr. Ellington on 3/20/18. Postop course was uneventful. Avoided narcotics given predisposition to delirium with her dementia. Her pain was managed with sched Tylenol (325mg TID), though she would intermittently refuse doses. Seen by therapy services postop, difficult time with participation given her advanced dementia. Recommended discharge to TCU. Discharge on Xarelto for DVT ppx. Will need to be monitored closely at TCU while on anticoagulation given her fall risk.  Patient denies pain today  PT/OT following  Tylenol for pain as noted above - has not requested/noted need for stronger pain control regimen since admission  Xarelto for anticoagulation per Ortho  WBAT RLE    Alzheimer's dementia without behavioral disturbance, unspecified timing of dementia onset  Prior to admission she had been residing with her  at home. Has a caregiver 6d/wk (exept for Sundays from 8-5).   Kept on PTA meds this stay, including Namenda, Aricept, Wellbutrin and prn Ativan, though some formulations were changed from long acting to short acting as she was requiring her meds to be crushed and given with applesauce.   PRN Seroquel ordered to help manage agitation/insomnia -- had minimal need during  postop period  Discharged to memory care TCU for ongoing care.  Chronic; progressive; continues to require 24-hr supportive cares  Patient very confused at baseline - responds with simple short answers  Does live at home with her  prior to fall - plans on D/c to [facility]  On regimen of Namenda, Aricept, Seroquel and Ativan  Has not had overt behaviors since admission, is impulsive and resistive at baseline - has not required use of Ativan or Seroquel, does well with staff redirection and interventions    Hyperlipidemia LDL goal <100  Chronic  No active tx regimen  Recent Labs   Lab Test  03/10/17   1033  02/23/16   0917  02/18/15   0837  02/03/14   0954   CHOL  214*  226*  219*  202*   HDL  53  70  68  55   LDL  128*  139*  136*  129   TRIG  166*  84  74  88   CHOLHDLRATIO   --    --   3.2  3.7     Adjustment disorder with mixed anxiety and depressed mood  No active tx regimen   Patient does not display any active s/sx of current depression - very happy in demeanor and pleasant with conversation, smiling frequently    Physical deconditioning  2/2 hospitalization and above noted diagnoses  PT/OT following    ALLERGIES: Sulfa drugs  Past Medical, Surgical, Family and Social History reviewed and updated in [employer].    Current Outpatient Prescriptions   Medication Sig Dispense Refill     acetaminophen (TYLENOL) 500 MG tablet Take 1,000 mg by mouth 3 times daily       donepezil (ARICEPT) 5 MG tablet Take 1 tablet (5 mg) by mouth At Bedtime 90 tablet 3     LORazepam (ATIVAN) 0.5 MG tablet Take 1 tablet (0.5 mg) by mouth every 8 hours as needed for anxiety 30 tablet 0     memantine XR (NAMENDA XR) 28 MG 24 hr capsule Take 1 capsule (28 mg) by mouth daily       QUEtiapine (SEROQUEL) 25 MG tablet Take 0.5 tablets (12.5 mg) by mouth 2 times daily as needed (give for severe agitation/at danger to self/others during the day, or for persistent insomina, mild agitation at night.) 60 tablet      rivaroxaban ANTICOAGULANT (XARELTO)  10 MG TABS tablet Take 1 tablet (10 mg) by mouth daily (with dinner) 21 tablet 0     Medications reviewed:  Medications reconciled to facility chart and changes were made to reflect current medications as identified as above med list. Below are the changes that were made:   Medications stopped since last EPIC medication reconciliation:   There are no discontinued medications.    Medications started since last [facility] medication reconciliation:  No orders of the defined types were placed in this encounter.    REVIEW OF SYSTEMS:  Limited secondary to cognitive impairment but today pt reports she's okay    Physical Exam:  /67  Pulse 83  Temp 98.4  F (36.9  C)  Resp 20  Wt 102 lb 4.8 oz (46.4 kg)  SpO2 97%  BMI 18.71 kg/m2  GENERAL APPEARANCE:  Alert, in no distress, appears healthy, thin, cooperative, pleasantly confused woman appearing younger than noted age in w/c eating lunch  ENT:  Mouth and posterior oropharynx normal, moist mucous membranes, normal hearing acuity  EYES:  EOM, conjunctivae, lids, pupils and irises normal  NECK:  No adenopathy, masses or thyromegaly  RESP:  Respiratory effort and palpation of chest normal, lungs clear to auscultation, no respiratory distress  CV:  Palpation and auscultation of heart done, regular rate and rhythm, no murmur, rub, or gallop, no edema, +2 pedal pulses  ABDOMEN: Normal bowel sounds, soft, nontender, no hepatosplenomegaly or other masses  M/S:   Gait and station abnormal - GABBY 2/2 patient being in w/c; Digits and nails abnormal - arthritic changes present; Examination of right lower extremity  Inspection, ROM, stability and muscle strength increasing with therapies  SKIN:  Palpation of skin and subcutaneous tissue baseline, Inspection of skin and subcutaneous tissue abnormal, R hip incision - GABBY today 2/2 to being in w/c  NEURO:   Cranial nerves 2-12 are normal tested and grossly at patient's baseline  PSYCH:  oriented to slef, insight and judgement impaired,  memory impaired, affect and mood normal      BP Reading:                             HR:  64-98  135/67  143/70  128/87    Recent Labs:   CBC RESULTS:   Recent Labs   Lab Test 04/11/18 03/23/18   0554  03/22/18   0630   03/20/18   2110  03/19/18   2156  07/05/17   1145   WBC   --    --    --    --    --   9.8  6.0   RBC   --    --    --    --    --   4.10  4.66   HGB  12.9   --   10.9*   < >   --   12.5  14.5   HCT   --    --    --    --    --   37.7  43.7   MCV   --    --    --    --    --   92  94   MCH   --    --    --    --    --   30.5  31.1   MCHC   --    --    --    --    --   33.2  33.2   RDW   --    --    --    --    --   12.5  13.1   PLT   --   264   --    --   237  250  242    < > = values in this interval not displayed.       Last Basic Metabolic Panel:  Recent Labs   Lab Test  03/24/18   0659  03/22/18   0630  03/21/18   0701   NA   --   141  138   POTASSIUM   --   3.8  4.2   CHLORIDE   --   109  106   AWILDA   --   8.0*  7.9*   CO2   --   25  26   BUN   --   12  16   CR  0.57  0.72  0.74   GLC   --   99  92       Liver Function Studies -   Recent Labs   Lab Test  07/05/17   1145  03/10/17   1033   PROTTOTAL  7.2  7.1   ALBUMIN  3.9  4.0   BILITOTAL  0.4  0.4   ALKPHOS  69  76   AST  20  16   ALT  19  21       TSH   Date Value Ref Range Status   03/10/2017 2.01 0.40 - 4.00 mU/L Final   02/23/2016 2.77 0.40 - 4.00 mU/L Final         Assessment/Plan:   Diagnosis Comments   1. Closed fracture of neck of right femur with routine healing  Ortho intervention as above  Continue Tylenol for pain control as ordered  Close monitoring with increased mobility - staff doing well with redirection and behavioral interventions   2. History of right hip hemiarthroplasty  As above  Xarelto for DVT prophylaxis  Wound care as ordered   3. Alzheimer's dementia without behavioral disturbance, unspecified timing of dementia onset  Pleasantly confused  Continue scheduled medications as ordered   4. Hyperlipidemia LDL goal <100   Stable without medical intervention   5. Adjustment disorder with mixed anxiety and depressed mood  Stable without medical intervention   6. Physical deconditioning  PT/OT adv per their recommendations       Electronically signed by  BERNA Conrad CNP                      Sincerely,        BERNA Conrad CNP

## 2018-05-08 NOTE — PROGRESS NOTES
[facility]    Chief Complaint   Patient presents with     ELENA       [facility] Medical Record Number:  [id number]    HPI:    Adriane Flores is a 78 year old  (1940), who is being seen today for an episodic care visit at [facility].  HPI information obtained from: facility chart records, facility staff and patient report.    Today's concern is:  Closed fracture of neck of right femur with routine healing  History of right hip hemiarthroplasty  Likely occurred after a fall sustained 3d prior to presentation. No LOC with fall. Ortho consulted and she underwent a R bipolar hip hemiarthroplasty per Dr. Ellington on 3/20/18. Postop course was uneventful. Avoided narcotics given predisposition to delirium with her dementia. Her pain was managed with sched Tylenol (325mg TID), though she would intermittently refuse doses. Seen by therapy services postop, difficult time with participation given her advanced dementia. Recommended discharge to TCU. Discharge on Xarelto for DVT ppx. Will need to be monitored closely at TCU while on anticoagulation given her fall risk.  Patient denies pain today  PT/OT following  Tylenol for pain as noted above - has not requested/noted need for stronger pain control regimen since admission  Xarelto for anticoagulation per Ortho  WBAT RLE    Alzheimer's dementia without behavioral disturbance, unspecified timing of dementia onset  Prior to admission she had been residing with her  at home. Has a caregiver 6d/wk (exept for Sundays from 8-5).   Kept on PTA meds this stay, including Namenda, Aricept, Wellbutrin and prn Ativan, though some formulations were changed from long acting to short acting as she was requiring her meds to be crushed and given with applesauce.   PRN Seroquel ordered to help manage agitation/insomnia -- had minimal need during postop period  Discharged to memory care TCU for ongoing care.  Chronic; progressive; continues to  require 24-hr supportive cares  Patient very confused at baseline - responds with simple short answers  Does live at home with her  prior to fall - plans on D/c to retirement  On regimen of Namenda, Aricept, Seroquel and Ativan  Has not had overt behaviors since admission, is impulsive and resistive at baseline - has not required use of Ativan or Seroquel, does well with staff redirection and interventions    Hyperlipidemia LDL goal <100  Chronic  No active tx regimen  Recent Labs   Lab Test  03/10/17   1033  02/23/16   0917  02/18/15   0837  02/03/14   0954   CHOL  214*  226*  219*  202*   HDL  53  70  68  55   LDL  128*  139*  136*  129   TRIG  166*  84  74  88   CHOLHDLRATIO   --    --   3.2  3.7     Adjustment disorder with mixed anxiety and depressed mood  No active tx regimen   Patient does not display any active s/sx of current depression - very happy in demeanor and pleasant with conversation, smiling frequently    Physical deconditioning  2/2 hospitalization and above noted diagnoses  PT/OT following    ALLERGIES: Sulfa drugs  Past Medical, Surgical, Family and Social History reviewed and updated in [employer].    Current Outpatient Prescriptions   Medication Sig Dispense Refill     acetaminophen (TYLENOL) 500 MG tablet Take 1,000 mg by mouth 3 times daily       donepezil (ARICEPT) 5 MG tablet Take 1 tablet (5 mg) by mouth At Bedtime 90 tablet 3     LORazepam (ATIVAN) 0.5 MG tablet Take 1 tablet (0.5 mg) by mouth every 8 hours as needed for anxiety 30 tablet 0     memantine XR (NAMENDA XR) 28 MG 24 hr capsule Take 1 capsule (28 mg) by mouth daily       QUEtiapine (SEROQUEL) 25 MG tablet Take 0.5 tablets (12.5 mg) by mouth 2 times daily as needed (give for severe agitation/at danger to self/others during the day, or for persistent insomina, mild agitation at night.) 60 tablet      rivaroxaban ANTICOAGULANT (XARELTO) 10 MG TABS tablet Take 1 tablet (10 mg) by mouth daily (with dinner) 21 tablet 0     Medications  reviewed:  Medications reconciled to facility chart and changes were made to reflect current medications as identified as above med list. Below are the changes that were made:   Medications stopped since last EPIC medication reconciliation:   There are no discontinued medications.    Medications started since last [facility] medication reconciliation:  No orders of the defined types were placed in this encounter.    REVIEW OF SYSTEMS:  Limited secondary to cognitive impairment but today pt reports she's okay    Physical Exam:  /67  Pulse 83  Temp 98.4  F (36.9  C)  Resp 20  Wt 102 lb 4.8 oz (46.4 kg)  SpO2 97%  BMI 18.71 kg/m2  GENERAL APPEARANCE:  Alert, in no distress, appears healthy, thin, cooperative, pleasantly confused woman appearing younger than noted age in w/c eating lunch  ENT:  Mouth and posterior oropharynx normal, moist mucous membranes, normal hearing acuity  EYES:  EOM, conjunctivae, lids, pupils and irises normal  NECK:  No adenopathy, masses or thyromegaly  RESP:  Respiratory effort and palpation of chest normal, lungs clear to auscultation, no respiratory distress  CV:  Palpation and auscultation of heart done, regular rate and rhythm, no murmur, rub, or gallop, no edema, +2 pedal pulses  ABDOMEN: Normal bowel sounds, soft, nontender, no hepatosplenomegaly or other masses  M/S:   Gait and station abnormal - GABBY 2/2 patient being in w/c; Digits and nails abnormal - arthritic changes present; Examination of right lower extremity  Inspection, ROM, stability and muscle strength increasing with therapies  SKIN:  Palpation of skin and subcutaneous tissue baseline, Inspection of skin and subcutaneous tissue abnormal, R hip incision - GABBY today 2/2 to being in w/c  NEURO:   Cranial nerves 2-12 are normal tested and grossly at patient's baseline  PSYCH:  oriented to slef, insight and judgement impaired, memory impaired, affect and mood normal      BP Reading:                             HR:   64-98  135/67  143/70  128/87    Recent Labs:   CBC RESULTS:   Recent Labs   Lab Test 04/11/18 03/23/18   0554  03/22/18   0630   03/20/18   2110  03/19/18   2156  07/05/17   1145   WBC   --    --    --    --    --   9.8  6.0   RBC   --    --    --    --    --   4.10  4.66   HGB  12.9   --   10.9*   < >   --   12.5  14.5   HCT   --    --    --    --    --   37.7  43.7   MCV   --    --    --    --    --   92  94   MCH   --    --    --    --    --   30.5  31.1   MCHC   --    --    --    --    --   33.2  33.2   RDW   --    --    --    --    --   12.5  13.1   PLT   --   264   --    --   237  250  242    < > = values in this interval not displayed.       Last Basic Metabolic Panel:  Recent Labs   Lab Test  03/24/18   0659  03/22/18   0630  03/21/18   0701   NA   --   141  138   POTASSIUM   --   3.8  4.2   CHLORIDE   --   109  106   AWILDA   --   8.0*  7.9*   CO2   --   25  26   BUN   --   12  16   CR  0.57  0.72  0.74   GLC   --   99  92       Liver Function Studies -   Recent Labs   Lab Test  07/05/17   1145  03/10/17   1033   PROTTOTAL  7.2  7.1   ALBUMIN  3.9  4.0   BILITOTAL  0.4  0.4   ALKPHOS  69  76   AST  20  16   ALT  19  21       TSH   Date Value Ref Range Status   03/10/2017 2.01 0.40 - 4.00 mU/L Final   02/23/2016 2.77 0.40 - 4.00 mU/L Final         Assessment/Plan:   Diagnosis Comments   1. Closed fracture of neck of right femur with routine healing  Ortho intervention as above  Continue Tylenol for pain control as ordered  Close monitoring with increased mobility - staff doing well with redirection and behavioral interventions   2. History of right hip hemiarthroplasty  As above  Xarelto for DVT prophylaxis  Wound care as ordered   3. Alzheimer's dementia without behavioral disturbance, unspecified timing of dementia onset  Pleasantly confused  Continue scheduled medications as ordered   4. Hyperlipidemia LDL goal <100  Stable without medical intervention   5. Adjustment disorder with mixed anxiety and  depressed mood  Stable without medical intervention   6. Physical deconditioning  PT/OT adv per their recommendations       Electronically signed by  BERNA Conrad CNP

## 2018-05-15 ENCOUNTER — DISCHARGE SUMMARY NURSING HOME (OUTPATIENT)
Dept: GERIATRICS | Facility: CLINIC | Age: 78
End: 2018-05-15
Payer: COMMERCIAL

## 2018-05-15 VITALS
RESPIRATION RATE: 17 BRPM | OXYGEN SATURATION: 96 % | WEIGHT: 102.3 LBS | HEART RATE: 65 BPM | BODY MASS INDEX: 18.71 KG/M2 | SYSTOLIC BLOOD PRESSURE: 146 MMHG | TEMPERATURE: 98.1 F | DIASTOLIC BLOOD PRESSURE: 60 MMHG

## 2018-05-15 DIAGNOSIS — F02.80 ALZHEIMER'S DEMENTIA WITHOUT BEHAVIORAL DISTURBANCE, UNSPECIFIED TIMING OF DEMENTIA ONSET: ICD-10-CM

## 2018-05-15 DIAGNOSIS — E78.5 HYPERLIPIDEMIA LDL GOAL <100: ICD-10-CM

## 2018-05-15 DIAGNOSIS — F43.23 ADJUSTMENT DISORDER WITH MIXED ANXIETY AND DEPRESSED MOOD: ICD-10-CM

## 2018-05-15 DIAGNOSIS — Z96.641 HISTORY OF RIGHT HIP HEMIARTHROPLASTY: ICD-10-CM

## 2018-05-15 DIAGNOSIS — G30.9 ALZHEIMER'S DEMENTIA WITHOUT BEHAVIORAL DISTURBANCE, UNSPECIFIED TIMING OF DEMENTIA ONSET: ICD-10-CM

## 2018-05-15 DIAGNOSIS — S72.001D CLOSED FRACTURE OF NECK OF RIGHT FEMUR WITH ROUTINE HEALING: Primary | ICD-10-CM

## 2018-05-15 DIAGNOSIS — R53.81 PHYSICAL DECONDITIONING: ICD-10-CM

## 2018-05-15 PROCEDURE — 99316 NF DSCHRG MGMT 30 MIN+: CPT | Performed by: NURSE PRACTITIONER

## 2018-05-15 NOTE — PROGRESS NOTES
[place] [facility] DISCHARGE SUMMARY    PATIENT'S NAME: Adriane Flores  YOB: 1940  MEDICAL RECORD NUMBER:  [id number]    PRIMARY CARE PROVIDER AND CLINIC RESPONSIBLE AFTER TRANSFER: Nikolov, Nikolay G [address] [facility] [address]     CODE STATUS/ADVANCE DIRECTIVES DISCUSSION:   CPR/Full code        Allergies   Allergen Reactions     Sulfa Drugs      30 yrs ago didn't feel well       TRANSFERRING PROVIDERS: BERNA Conrad CNP, Krista Moscoso MD  DATE OF SNF ADMISSION:  March / 24 / 2018  DATE OF SNF (anticipated) DISCHARGE: May / 17 / 2018  DISCHARGE DISPOSITION: [facility] Provider   Nursing Facility: [facility] stay 3/19/2018 to 3/24/2018.     Condition on Discharge:  Improving.  Function:  Limited assistance with: Bed mobility (SBA>>Andrzej), Transfers (SBA>>CGA), Ambulation (CGA>>Andrzej), Stairs (CGA w/side rails), UB dressing, toileting and eating (limited>>supervision); Extensive assistance with: LB dressing, grooming, bathing, and toileting (clothes and bucky-care). Assistive device up to 300' with FWW.  Cognitive Scores: CPT 2.4/5.6 and MMSE - 1/30    Equipment: walker    DISCHARGE DIAGNOSIS:   1. Closed fracture of neck of right femur with routine healing    2. History of right hip hemiarthroplasty    3. Alzheimer's dementia without behavioral disturbance, unspecified timing of dementia onset    4. Hyperlipidemia LDL goal <100    5. Adjustment disorder with mixed anxiety and depressed mood    6. Physical deconditioning        HPI Nursing Facility Course:  HPI information obtained from: facility chart records, facility staff, patient report and [facility] chart review.  Closed fracture of neck of right femur with routine healing  History of right hip hemiarthroplasty  Likely occurred after a fall sustained 3d prior to presentation. No LOC with fall. Ortho consulted and she underwent a R bipolar hip  hemiarthroplasty per Dr. Ellington on 3/20/18. Postop course was uneventful. Avoided narcotics given predisposition to delirium with her dementia. Her pain was managed with sched Tylenol (325mg TID), though she would intermittently refuse doses. Seen by therapy services postop, difficult time with participation given her advanced dementia. Recommended discharge to TCU. Discharge on Xarelto for DVT ppx. Will need to be monitored closely at TCU while on anticoagulation given her fall risk.  Patient denies pain today  PT/OT completed in TCU  Tylenol for pain as noted above - has not requested/noted need for stronger pain control regimen since admission  Xarelto for anticoagulation per Ortho  WBAT RLE  Will continue therapies in [facility] as noted below  F/u with Ortho per their recommendations    Alzheimer's dementia without behavioral disturbance, unspecified timing of dementia onset  Prior to admission she had been residing with her  at home. Has a caregiver 6d/wk (exept for Sundays from 8-5).   Kept on PTA meds this stay, including Namenda, Aricept, Wellbutrin and prn Ativan, though some formulations were changed from long acting to short acting as she was requiring her meds to be crushed and given with applesauce.   PRN Seroquel ordered to help manage agitation/insomnia -- had minimal need during postop period  Discharged to memory care TCU for ongoing care.  Chronic; progressive; continues to require 24-hr supportive cares  Patient very confused at baseline - responds with simple short answers  Does live at home with her  prior to fall - plans on D/c to [facility]  On regimen of Namenda, Aricept, Seroquel and Ativan  Has not had overt behaviors since admission, is impulsive and resistive at baseline - has not required use of Ativan or Seroquel, does well with staff redirection and interventions  Stable without medical intervention  F/u with PCP for ongoing monitoring and management    Hyperlipidemia LDL goal  <100  Chronic  No active tx regimen  Recent Labs   Lab Test  03/10/17   1033  02/23/16   0917  02/18/15   0837  02/03/14   0954   CHOL  214*  226*  219*  202*   HDL  53  70  68  55   LDL  128*  139*  136*  129   TRIG  166*  84  74  88   CHOLHDLRATIO   --    --   3.2  3.7   Stable without medical intervention  F/u with PCP for ongoing monitoring and management    Adjustment disorder with mixed anxiety and depressed mood  No active tx regimen   Patient does not display any active s/sx of current depression - very happy in demeanor and pleasant with conversation, smiling frequently  Wellbutrin that was ordered on admission D/c'd per  request  Stable without medical intervention  F/u with PCP for ongoing monitoring and future interventions as needed    Physical deconditioning  2/2 hospitalization and above noted diagnoses  PT/OT to follow after D/c    PAST MEDICAL HISTORY:  has a past medical history of Displacement of intervertebral disc, site unspecified, without myelopathy (1/88); Lateral epicondylitis  of elbow; Other internal derangement of knee(717.89); and Other internal derangement of knee(717.89).    DISCHARGE MEDICATIONS:  Current Outpatient Prescriptions   Medication Sig Dispense Refill     acetaminophen (TYLENOL) 500 MG tablet Take 1,000 mg by mouth 3 times daily       donepezil (ARICEPT) 5 MG tablet Take 1 tablet (5 mg) by mouth At Bedtime 90 tablet 3     LORazepam (ATIVAN) 0.5 MG tablet Take 1 tablet (0.5 mg) by mouth every 8 hours as needed for anxiety 30 tablet 0     memantine XR (NAMENDA XR) 28 MG 24 hr capsule Take 1 capsule (28 mg) by mouth daily       QUEtiapine (SEROQUEL) 25 MG tablet Take 0.5 tablets (12.5 mg) by mouth 2 times daily as needed (give for severe agitation/at danger to self/others during the day, or for persistent insomina, mild agitation at night.) 60 tablet      rivaroxaban ANTICOAGULANT (XARELTO) 10 MG TABS tablet Take 1 tablet (10 mg) by mouth daily (with dinner) 21 tablet 0        MEDICATION CHANGES/RATIONALE:     5/13/18: Tylenol 1g TID for pain control  Controlled medications sent with patient:   not applicable/none     ROS:    Limited secondary to cognitive impairment     Physical Exam:   Vitals: /60  Pulse 65  Temp 98.1  F (36.7  C)  Resp 17  Wt 102 lb 4.8 oz (46.4 kg)  SpO2 96%  BMI 18.71 kg/m2  BMI= Body mass index is 18.71 kg/(m^2).  GENERAL APPEARANCE:  Alert, in no distress, appears healthy, thin, cooperative, pleasantly confused woman appearing younger than noted age in recliner in day room  ENT:  Mouth and posterior oropharynx normal, moist mucous membranes, normal hearing acuity  EYES:  EOM, conjunctivae, lids, pupils and irises normal  NECK:  No adenopathy, masses or thyromegaly  RESP:  Respiratory effort and palpation of chest normal, lungs clear to auscultation, no respiratory distress  CV:  Palpation and auscultation of heart done, regular rate and rhythm, no murmur, rub, or gallop, no edema, +2 pedal pulses  ABDOMEN: Normal bowel sounds, soft, nontender, no hepatosplenomegaly or other masses  M/S:   Gait and station abnormal - GABBY 2/2 patient being in recliner; Digits and nails abnormal - arthritic changes present; Examination of right lower extremity  Inspection, ROM, stability and muscle strength increasing with therapies  SKIN:  Palpation of skin and subcutaneous tissue baseline, Inspection of skin and subcutaneous tissue abnormal, R hip incision - GABBY today 2/2 to being in w/c - healed well w/o incident per staff report  NEURO:   Cranial nerves 2-12 are normal tested and grossly at patient's baseline  PSYCH:  oriented to slef, insight and judgement impaired, memory impaired, affect and mood normal      BP Reading:                 HR:  61-97  135/60  140/60  143/81    DISCHARGE PLAN:  Occupational Therapy, Physical Therapy and From:  [facility]  Patient instructed to follow-up with:  Facility provider in 7-10 days      Current [place] scheduled  appointments: None  MTM referral needed and placed by this provider: No    Pending labs: None  SNF labs   CBC RESULTS:   Recent Labs   Lab Test 04/11/18 03/23/18   0554  03/22/18   0630   03/20/18   2110  03/19/18   2156  07/05/17   1145   WBC   --    --    --    --    --   9.8  6.0   RBC   --    --    --    --    --   4.10  4.66   HGB  12.9   --   10.9*   < >   --   12.5  14.5   HCT   --    --    --    --    --   37.7  43.7   MCV   --    --    --    --    --   92  94   MCH   --    --    --    --    --   30.5  31.1   MCHC   --    --    --    --    --   33.2  33.2   RDW   --    --    --    --    --   12.5  13.1   PLT   --   264   --    --   237  250  242    < > = values in this interval not displayed.       Last Basic Metabolic Panel:  Recent Labs   Lab Test  03/24/18   0659  03/22/18   0630  03/21/18   0701   NA   --   141  138   POTASSIUM   --   3.8  4.2   CHLORIDE   --   109  106   AWILDA   --   8.0*  7.9*   CO2   --   25  26   BUN   --   12  16   CR  0.57  0.72  0.74   GLC   --   99  92       Liver Function Studies -   Recent Labs   Lab Test  07/05/17   1145  03/10/17   1033   PROTTOTAL  7.2  7.1   ALBUMIN  3.9  4.0   BILITOTAL  0.4  0.4   ALKPHOS  69  76   AST  20  16   ALT  19  21       TSH   Date Value Ref Range Status   03/10/2017 2.01 0.40 - 4.00 mU/L Final   02/23/2016 2.77 0.40 - 4.00 mU/L Final         Discharge Treatments: None    TOTAL DISCHARGE TIME:   Greater than 30 minutes  Electronically signed by:  BERNA Conrad CNP

## 2018-05-15 NOTE — LETTER
5/15/2018        RE: Adriane Flores  [address]  [facility] [address]          [place] [facility]ERIAT[facility]IC [facility] DISCHARGE SUMMARY    PATIENT'S NAME: Adriane Flores  YOB: 1940  MEDICAL RECORD NUMBER:  [id number]    PRIMARY CARE PROVIDER AND CLINIC RESPONSIBLE AFTER TRANSFER: Saul Wheeler [address] [facility] [address]     CODE STATUS/ADVANCE DIRECTIVES DISCUSSION:   CPR/Full code        Allergies   Allergen Reactions     Sulfa Drugs      30 yrs ago didn't feel well       TRANSFERRING PROVIDERS: BERNA Conrad CNP, Krista Moscoso MD  DATE OF SNF ADMISSION:  March / 24 / 2018  DATE OF SNF (anticipated) DISCHARGE: May / 17 / 2018  DISCHARGE DISPOSITION: [facility] Provider   Nursing Facility: [facility] stay 3/19/2018 to 3/24/2018.     Condition on Discharge:  Improving.  Function:  Limited assistance with: Bed mobility (SBA>>Andrzej), Transfers (SBA>>CGA), Ambulation (CGA>>Andrzej), Stairs (CGA w/side rails), UB dressing, toileting and eating (limited>>supervision); Extensive assistance with: LB dressing, grooming, bathing, and toileting (clothes and bucky-care). Assistive device up to 300' with FWW.  Cognitive Scores: CPT 2.4/5.6 and MMSE - 1/30    Equipment: walker    DISCHARGE DIAGNOSIS:   1. Closed fracture of neck of right femur with routine healing    2. History of right hip hemiarthroplasty    3. Alzheimer's dementia without behavioral disturbance, unspecified timing of dementia onset    4. Hyperlipidemia LDL goal <100    5. Adjustment disorder with mixed anxiety and depressed mood    6. Physical deconditioning        HPI Nursing Facility Course:  HPI information obtained from: facility chart records, facility staff, patient report and [facility] chart review.  Closed fracture of neck of right femur with routine healing  History of right hip hemiarthroplasty  Likely occurred after a fall sustained 3d  prior to presentation. No LOC with fall. Ortho consulted and she underwent a R bipolar hip hemiarthroplasty per Dr. Ellington on 3/20/18. Postop course was uneventful. Avoided narcotics given predisposition to delirium with her dementia. Her pain was managed with sched Tylenol (325mg TID), though she would intermittently refuse doses. Seen by therapy services postop, difficult time with participation given her advanced dementia. Recommended discharge to TCU. Discharge on Xarelto for DVT ppx. Will need to be monitored closely at TCU while on anticoagulation given her fall risk.  Patient denies pain today  PT/OT completed in TCU  Tylenol for pain as noted above - has not requested/noted need for stronger pain control regimen since admission  Xarelto for anticoagulation per Ortho  WBAT RLE  Will continue therapies in [facility] as noted below  F/u with Ortho per their recommendations    Alzheimer's dementia without behavioral disturbance, unspecified timing of dementia onset  Prior to admission she had been residing with her  at home. Has a caregiver 6d/wk (exept for Sundays from 8-5).   Kept on PTA meds this stay, including Namenda, Aricept, Wellbutrin and prn Ativan, though some formulations were changed from long acting to short acting as she was requiring her meds to be crushed and given with applesauce.   PRN Seroquel ordered to help manage agitation/insomnia -- had minimal need during postop period  Discharged to memory care TCU for ongoing care.  Chronic; progressive; continues to require 24-hr supportive cares  Patient very confused at baseline - responds with simple short answers  Does live at home with her  prior to fall - plans on D/c to [facility]  On regimen of Namenda, Aricept, Seroquel and Ativan  Has not had overt behaviors since admission, is impulsive and resistive at baseline - has not required use of Ativan or Seroquel, does well with staff redirection and interventions  Stable without medical  intervention  F/u with PCP for ongoing monitoring and management    Hyperlipidemia LDL goal <100  Chronic  No active tx regimen  Recent Labs   Lab Test  03/10/17   1033  02/23/16   0917  02/18/15   0837  02/03/14   0954   CHOL  214*  226*  219*  202*   HDL  53  70  68  55   LDL  128*  139*  136*  129   TRIG  166*  84  74  88   CHOLHDLRATIO   --    --   3.2  3.7   Stable without medical intervention  F/u with PCP for ongoing monitoring and management    Adjustment disorder with mixed anxiety and depressed mood  No active tx regimen   Patient does not display any active s/sx of current depression - very happy in demeanor and pleasant with conversation, smiling frequently  Wellbutrin that was ordered on admission D/c'd per  request  Stable without medical intervention  F/u with PCP for ongoing monitoring and future interventions as needed    Physical deconditioning  2/2 hospitalization and above noted diagnoses  PT/OT to follow after D/c    PAST MEDICAL HISTORY:  has a past medical history of Displacement of intervertebral disc, site unspecified, without myelopathy (1/88); Lateral epicondylitis  of elbow; Other internal derangement of knee(717.89); and Other internal derangement of knee(717.89).    DISCHARGE MEDICATIONS:  Current Outpatient Prescriptions   Medication Sig Dispense Refill     acetaminophen (TYLENOL) 500 MG tablet Take 1,000 mg by mouth 3 times daily       donepezil (ARICEPT) 5 MG tablet Take 1 tablet (5 mg) by mouth At Bedtime 90 tablet 3     LORazepam (ATIVAN) 0.5 MG tablet Take 1 tablet (0.5 mg) by mouth every 8 hours as needed for anxiety 30 tablet 0     memantine XR (NAMENDA XR) 28 MG 24 hr capsule Take 1 capsule (28 mg) by mouth daily       QUEtiapine (SEROQUEL) 25 MG tablet Take 0.5 tablets (12.5 mg) by mouth 2 times daily as needed (give for severe agitation/at danger to self/others during the day, or for persistent insomina, mild agitation at night.) 60 tablet      rivaroxaban  ANTICOAGULANT (XARELTO) 10 MG TABS tablet Take 1 tablet (10 mg) by mouth daily (with dinner) 21 tablet 0       MEDICATION CHANGES/RATIONALE:     5/13/18: Tylenol 1g TID for pain control  Controlled medications sent with patient:   not applicable/none     ROS:    Limited secondary to cognitive impairment     Physical Exam:   Vitals: /60  Pulse 65  Temp 98.1  F (36.7  C)  Resp 17  Wt 102 lb 4.8 oz (46.4 kg)  SpO2 96%  BMI 18.71 kg/m2  BMI= Body mass index is 18.71 kg/(m^2).  GENERAL APPEARANCE:  Alert, in no distress, appears healthy, thin, cooperative, pleasantly confused woman appearing younger than noted age in recliner in day room  ENT:  Mouth and posterior oropharynx normal, moist mucous membranes, normal hearing acuity  EYES:  EOM, conjunctivae, lids, pupils and irises normal  NECK:  No adenopathy, masses or thyromegaly  RESP:  Respiratory effort and palpation of chest normal, lungs clear to auscultation, no respiratory distress  CV:  Palpation and auscultation of heart done, regular rate and rhythm, no murmur, rub, or gallop, no edema, +2 pedal pulses  ABDOMEN: Normal bowel sounds, soft, nontender, no hepatosplenomegaly or other masses  M/S:   Gait and station abnormal - GABBY 2/2 patient being in recliner; Digits and nails abnormal - arthritic changes present; Examination of right lower extremity  Inspection, ROM, stability and muscle strength increasing with therapies  SKIN:  Palpation of skin and subcutaneous tissue baseline, Inspection of skin and subcutaneous tissue abnormal, R hip incision - GABBY today 2/2 to being in w/c - healed well w/o incident per staff report  NEURO:   Cranial nerves 2-12 are normal tested and grossly at patient's baseline  PSYCH:  oriented to slef, insight and judgement impaired, memory impaired, affect and mood normal      BP Reading:                 HR:  61-97  135/60  140/60  143/81    DISCHARGE PLAN:  Occupational Therapy, Physical Therapy and From:  Home health care  [facility]  Patient instructed to follow-up with:  Facility provider in 7-10 days      [facility] scheduled appointments: None  MTM referral needed and placed by this provider: No    Pending labs: None  SNF labs   CBC RESULTS:   Recent Labs   Lab Test 04/11/18 03/23/18   0554  03/22/18   0630   03/20/18   2110  03/19/18   2156  07/05/17   1145   WBC   --    --    --    --    --   9.8  6.0   RBC   --    --    --    --    --   4.10  4.66   HGB  12.9   --   10.9*   < >   --   12.5  14.5   HCT   --    --    --    --    --   37.7  43.7   MCV   --    --    --    --    --   92  94   MCH   --    --    --    --    --   30.5  31.1   MCHC   --    --    --    --    --   33.2  33.2   RDW   --    --    --    --    --   12.5  13.1   PLT   --   264   --    --   237  250  242    < > = values in this interval not displayed.       Last Basic Metabolic Panel:  Recent Labs   Lab Test  03/24/18   0659  03/22/18   0630  03/21/18   0701   NA   --   141  138   POTASSIUM   --   3.8  4.2   CHLORIDE   --   109  106   AWILDA   --   8.0*  7.9*   CO2   --   25  26   BUN   --   12  16   CR  0.57  0.72  0.74   GLC   --   99  92       Liver Function Studies -   Recent Labs   Lab Test  07/05/17   1145  03/10/17   1033   PROTTOTAL  7.2  7.1   ALBUMIN  3.9  4.0   BILITOTAL  0.4  0.4   ALKPHOS  69  76   AST  20  16   ALT  19  21       TSH   Date Value Ref Range Status   03/10/2017 2.01 0.40 - 4.00 mU/L Final   02/23/2016 2.77 0.40 - 4.00 mU/L Final         Discharge Treatments: None    TOTAL DISCHARGE TIME:   Greater than 30 minutes  Electronically signed by:  BERNA Conrad CNP        Documentation of Face-to-Face and Certification for Home Health Services     Patient: Adriane Flores   YOB: 1940  MR Number: [id number]  Today's Date: May 15, 2018    I certify that patient: Adriane Flores is under my care and that I, or a nurse practitioner or physician's assistant working with me, had a face-to-face encounter that meets the  physician face-to-face encounter requirements with this patient on: 5/15/2018.    This encounter with the patient was in whole, or in part, for the following medical condition, which is the primary reason for home health care: Closed fracture of neck of right femur with routine healing. Diagnoses of History of right hip hemiarthroplasty, Alzheimer's dementia without behavioral disturbance, unspecified timing of dementia onset, Hyperlipidemia LDL goal <100, Adjustment disorder with mixed anxiety and depressed mood, and Physical deconditioning were also pertinent to this visit.    I certify that, based on my findings, the following services are medically necessary home health services: Occupational Therapy and Physical Therapy.    My clinical findings support the need for the above services because: Occupational Therapy Services are needed to assess and treat cognitive ability and address ADL safety due to impairment in cognition and safety. and Physical Therapy Services are needed to assess and treat the following functional impairments: physical deconditioning 2/2 above noted diagnoses.    Further, I certify that my clinical findings support that this patient is homebound (i.e. absences from home require considerable and taxing effort and are for medical reasons or [employer] services or infrequently or of short duration when for other reasons) because: Requires assistance of another person or specialized equipment to access medical services because patient: Is prone to wander/get lost without assistance...    Based on the above findings. I certify that this patient is confined to the home and needs intermittent skilled nursing care, physical therapy and/or speech therapy.  The patient is under my care, and I have initiated the establishment of the plan of care.  This patient will be followed by a physician who will periodically review the plan of care.  Physician/Provider to provide follow up care: Saul Wheeler  G    Responsible Medicare certified PECOS Physician: Dr. Krista Moscoso MD  Physician Signature: See electronic signature associated with these discharge orders.  Date: May 15, 2018      Sincerely,        BERNA Conrad CNP

## 2018-05-23 NOTE — PROGRESS NOTES
Documentation of Face-to-Face and Certification for Home Health Services     Patient: Adriane Flores   YOB: 1940  MR Number: [id number]  Today's Date: May 15, 2018    I certify that patient: Adriane Flores is under my care and that I, or a nurse practitioner or physician's assistant working with me, had a face-to-face encounter that meets the physician face-to-face encounter requirements with this patient on: 5/15/2018.    This encounter with the patient was in whole, or in part, for the following medical condition, which is the primary reason for home health care: Closed fracture of neck of right femur with routine healing. Diagnoses of History of right hip hemiarthroplasty, Alzheimer's dementia without behavioral disturbance, unspecified timing of dementia onset, Hyperlipidemia LDL goal <100, Adjustment disorder with mixed anxiety and depressed mood, and Physical deconditioning were also pertinent to this visit.    I certify that, based on my findings, the following services are medically necessary home health services: Occupational Therapy and Physical Therapy.    My clinical findings support the need for the above services because: Occupational Therapy Services are needed to assess and treat cognitive ability and address ADL safety due to impairment in cognition and safety. and Physical Therapy Services are needed to assess and treat the following functional impairments: physical deconditioning 2/2 above noted diagnoses.    Further, I certify that my clinical findings support that this patient is homebound (i.e. absences from home require considerable and taxing effort and are for medical reasons or [employer] services or infrequently or of short duration when for other reasons) because: Requires assistance of another person or specialized equipment to access medical services because patient: Is prone to wander/get lost without assistance...    Based on the above findings. I certify  that this patient is confined to the home and needs intermittent skilled nursing care, physical therapy and/or speech therapy.  The patient is under my care, and I have initiated the establishment of the plan of care.  This patient will be followed by a physician who will periodically review the plan of care.  Physician/Provider to provide follow up care: Saul Wheeler    Responsible Medicare certified PECOS Physician: Dr. Krista Moscoso MD  Physician Signature: See electronic signature associated with these discharge orders.  Date: May 15, 2018

## 2018-05-24 ENCOUNTER — PATIENT OUTREACH (OUTPATIENT)
Dept: CARE COORDINATION | Facility: CLINIC | Age: 78
End: 2018-05-24

## 2018-05-24 NOTE — LETTER
[facility] CARE COORDINATION  [address]  [facility] [address]    May 25, 2018    Adriane Flores  [address]  [facility] [address]      Dear Adriane,    I am a clinic care coordinator who works with Saul Wheeler MD at [facility]. I wanted to introduce myself and provide you with my contact information for you to be able to call me with any questions or concerns. I wanted to introduce myself and provide you with my contact information so that you can call me with questions or concerns about your health care. Below is a description of clinic care coordination and how I can further assist you.     The clinic care coordinator is a registered nurse and/or  who understand the health care system. The goal of clinic care coordination is to help you manage your health and improve access to the [facility] system in the most efficient manner. The registered nurse can assist you in meeting your health care goals by providing education, coordinating services, and strengthening the communication among your providers. The  can assist you with financial, behavioral, psychosocial, chemical dependency, counseling, and/or psychiatric resources.    Please feel free to contact me at [phone], with any questions or concerns. We at [facility] are focused on providing you with the highest-quality healthcare experience possible and that all starts with you.     Sincerely,     Leia Melton RN-BSN   Care Coordination  Phone:  [phone]  Email: [email or web address]      Enclosed: I have enclosed a copy of a 24 Hour Access Plan. This has helpful phone numbers for you to call when needed. Please keep this in an easy to access place to use as needed.

## 2018-05-24 NOTE — LETTER
Health Care Home - Access Care Plan    About Me  Patient Name:  Adriane Flores    YOB: 1940  Age:                             78 year old   Mahesh MRN:            [id number] Telephone Information:     Home Phone [phone]   Mobile [phone]       Address:    [address]  [facility] [address] Email address:  No e-mail address on record      Emergency Contact(s)  Name Relationship Lgl Grd Work Phone Home Phone Mobile Phone   1. ANDRIA FLORES Spouse  [phone]   2. NO SECONDARY    none              Health Maintenance:      My Access Plan  Medical Emergency 911   Questions or concerns during clinic hours Primary Clinic Line, I will call the clinic directly: [facility] [phone]   24 Hour Appointment Line [phone] or  [phone]) (toll free)   24 Hour Nurse Line [phone] (toll free)   Questions or concerns outside clinic hours 24 Hour Appointment Line, I will call the after-hours on-call line:   [facility] [phone] or [phone]) (toll-free)   Preferred Urgent Care     Preferred Hospital     Preferred Pharmacy [facility] [address] AT [facility] [address]     Behavioral Health Crisis Line The National Suicide Prevention Lifeline at 1-560.978.1874 or 911     My Care Team Members  Patient Care Team       Relationship Specialty Notifications Start End    Sual Wheeler MD PCP - General Internal Medicine  8/1/14     Phone: [phone] Pager: [phone] Fax: [phone]        [address] [facility] [address]    Leia Melton, RN Clinic Care Coordinator Primary Care - [facility]  5/24/18     Phone: [phone] Fax: [phone]               My Medical and Care Information  Problem List   Patient Active Problem List   Diagnosis     Generalized osteoarthrosis, unspecified site     Disorder of bone and cartilage      Flatulence, eructation, and gas pain     Pure hypercholesterolemia     Hyperlipidemia LDL goal <100     Advanced directives, counseling/discussion     Memory disorder     Alzheimer's disease     Closed fracture of neck of right femur with routine healing     History of right hip hemiarthroplasty     Adjustment disorder with mixed anxiety and depressed mood     Physical deconditioning      Current Medications and Allergies:  See printed Medication Report

## 2018-05-25 NOTE — PROGRESS NOTES
Clinic Care Coordination Contact  Care Coordination Communication         Clinical Data: Patient discharged from [facility] on 5/17/18 to home with home care services.     Home Care Contact:              Home Care Agency: [facility]              Contact name () and phone number: Zack [phone]              Care Coordination contacted home care: Yes              Anticipated start of care date: svs already started.    Patient Contact:               Introduced self and role of care coordination.    RN CC unable to reach, left VM with contact information.  Will send intro  letter and Access plan                            Follow up appointment is scheduled for:  N/A  No appt scheduled    Plan: RN/SW Care Coordinator will await notification from home care staff informing RN/SW Care Coordinator of patients discharge plans/needs. RN/SW Care Coordinator will review chart and outreach to home care every 4 weeks and as needed.      Leia Melton RN-BSN   Care Coordination  Phone:  [phone]  Email: [email or web address]

## 2018-07-23 ENCOUNTER — PATIENT OUTREACH (OUTPATIENT)
Dept: CARE COORDINATION | Facility: CLINIC | Age: 78
End: 2018-07-23

## 2018-07-23 NOTE — PROGRESS NOTES
Clinic Care Coordination Contact  [facility]/Voicemail    Referral Source: Home Care  Clinical Data: Care Coordinator Outreach.  Chart reviewed.  Called [facility] [facility] for update.  Patient was discharged from their services on 6/20/18.  Outreach attempted x 1.  Left message on voicemail with call back information and requested return call.  Plan:. Care Coordinator will try to reach patient again in 3-5 business days.      Leia Melton RN-BSN   Care Coordination  Phone:  [phone]  Email: [email or web address]

## 2018-08-15 ENCOUNTER — PATIENT OUTREACH (OUTPATIENT)
Dept: CARE COORDINATION | Facility: CLINIC | Age: 78
End: 2018-08-15

## 2018-08-15 NOTE — PROGRESS NOTES
Clinic Care Coordination Contact  [facility]/Voicemail       Clinical Data: Care Coordinator Outreach  Outreach attempted x 2.  Home phone is disconnected. Left message on mobile voicemail with call back information and requested return call.  Plan: Care Coordinator mailed out care coordination introduction letter on 5/25/18. Care Coordinator will do no further outreaches at this time.    Leia Melton RN-BSN   Care Coordination  Phone:  [phone]  Email: [email or web address]

## 2021-01-20 ENCOUNTER — HOSPITAL ENCOUNTER (EMERGENCY)
Facility: CLINIC | Age: 81
Discharge: HOME | End: 2021-01-20
Attending: EMERGENCY MEDICINE | Admitting: EMERGENCY MEDICINE
Payer: COMMERCIAL

## 2021-01-20 VITALS
HEART RATE: 86 BPM | OXYGEN SATURATION: 93 % | DIASTOLIC BLOOD PRESSURE: 76 MMHG | SYSTOLIC BLOOD PRESSURE: 144 MMHG | RESPIRATION RATE: 18 BRPM | TEMPERATURE: 97.1 F

## 2021-01-20 DIAGNOSIS — R11.10 VOMITING, INTRACTABILITY OF VOMITING NOT SPECIFIED, PRESENCE OF NAUSEA NOT SPECIFIED, UNSPECIFIED VOMITING TYPE: ICD-10-CM

## 2021-01-20 LAB
ALBUMIN UR-MCNC: 10 MG/DL
ANION GAP SERPL CALCULATED.3IONS-SCNC: 6 MMOL/L (ref 3–14)
APPEARANCE UR: ABNORMAL
BACTERIA #/AREA URNS HPF: ABNORMAL /HPF
BASOPHILS # BLD AUTO: 0.1 10E9/L (ref 0–0.2)
BASOPHILS NFR BLD AUTO: 0.7 %
BILIRUB UR QL STRIP: NEGATIVE
BUN SERPL-MCNC: 24 MG/DL (ref 7–30)
CALCIUM SERPL-MCNC: 9.1 MG/DL (ref 8.5–10.1)
CHLORIDE SERPL-SCNC: 106 MMOL/L (ref 94–109)
CO2 SERPL-SCNC: 27 MMOL/L (ref 20–32)
COLOR UR AUTO: YELLOW
CREAT SERPL-MCNC: 0.78 MG/DL (ref 0.52–1.04)
DIFFERENTIAL METHOD BLD: ABNORMAL
EOSINOPHIL # BLD AUTO: 0.3 10E9/L (ref 0–0.7)
EOSINOPHIL NFR BLD AUTO: 3.8 %
ERYTHROCYTE [DISTWIDTH] IN BLOOD BY AUTOMATED COUNT: 12.5 % (ref 10–15)
GFR SERPL CREATININE-BSD FRML MDRD: 71 ML/MIN/{1.73_M2}
GLUCOSE SERPL-MCNC: 100 MG/DL (ref 70–99)
GLUCOSE UR STRIP-MCNC: NEGATIVE MG/DL
HCT VFR BLD AUTO: 43.4 % (ref 35–47)
HGB BLD-MCNC: 13.6 G/DL (ref 11.7–15.7)
HGB UR QL STRIP: NEGATIVE
HYALINE CASTS #/AREA URNS LPF: 4 /LPF (ref 0–2)
IMM GRANULOCYTES # BLD: 0 10E9/L (ref 0–0.4)
IMM GRANULOCYTES NFR BLD: 0.3 %
INTERPRETATION ECG - MUSE: NORMAL
KETONES UR STRIP-MCNC: NEGATIVE MG/DL
LEUKOCYTE ESTERASE UR QL STRIP: ABNORMAL
LYMPHOCYTES # BLD AUTO: 2.1 10E9/L (ref 0.8–5.3)
LYMPHOCYTES NFR BLD AUTO: 30.2 %
MCH RBC QN AUTO: 30.8 PG (ref 26.5–33)
MCHC RBC AUTO-ENTMCNC: 31.3 G/DL (ref 31.5–36.5)
MCV RBC AUTO: 98 FL (ref 78–100)
MONOCYTES # BLD AUTO: 0.8 10E9/L (ref 0–1.3)
MONOCYTES NFR BLD AUTO: 10.8 %
MUCOUS THREADS #/AREA URNS LPF: PRESENT /LPF
NEUTROPHILS # BLD AUTO: 3.8 10E9/L (ref 1.6–8.3)
NEUTROPHILS NFR BLD AUTO: 54.2 %
NITRATE UR QL: POSITIVE
NRBC # BLD AUTO: 0 10*3/UL
NRBC BLD AUTO-RTO: 0 /100
PH UR STRIP: 5 PH (ref 5–7)
PLATELET # BLD AUTO: 296 10E9/L (ref 150–450)
POTASSIUM SERPL-SCNC: 4.8 MMOL/L (ref 3.4–5.3)
RBC # BLD AUTO: 4.41 10E12/L (ref 3.8–5.2)
RBC #/AREA URNS AUTO: 1 /HPF (ref 0–2)
SODIUM SERPL-SCNC: 139 MMOL/L (ref 133–144)
SOURCE: ABNORMAL
SP GR UR STRIP: 1.02 (ref 1–1.03)
TROPONIN I SERPL-MCNC: <0.015 UG/L (ref 0–0.04)
UROBILINOGEN UR STRIP-MCNC: 0 MG/DL (ref 0–2)
WBC # BLD AUTO: 7.1 10E9/L (ref 4–11)
WBC #/AREA URNS AUTO: 2 /HPF (ref 0–5)

## 2021-01-20 PROCEDURE — 80048 BASIC METABOLIC PNL TOTAL CA: CPT | Performed by: EMERGENCY MEDICINE

## 2021-01-20 PROCEDURE — 87086 URINE CULTURE/COLONY COUNT: CPT | Performed by: EMERGENCY MEDICINE

## 2021-01-20 PROCEDURE — 96374 THER/PROPH/DIAG INJ IV PUSH: CPT

## 2021-01-20 PROCEDURE — 87186 SC STD MICRODIL/AGAR DIL: CPT | Performed by: EMERGENCY MEDICINE

## 2021-01-20 PROCEDURE — 93005 ELECTROCARDIOGRAM TRACING: CPT

## 2021-01-20 PROCEDURE — 99284 EMERGENCY DEPT VISIT MOD MDM: CPT | Mod: 25

## 2021-01-20 PROCEDURE — 96361 HYDRATE IV INFUSION ADD-ON: CPT

## 2021-01-20 PROCEDURE — 84484 ASSAY OF TROPONIN QUANT: CPT | Performed by: EMERGENCY MEDICINE

## 2021-01-20 PROCEDURE — 258N000003 HC RX IP 258 OP 636: Performed by: EMERGENCY MEDICINE

## 2021-01-20 PROCEDURE — 250N000011 HC RX IP 250 OP 636: Performed by: EMERGENCY MEDICINE

## 2021-01-20 PROCEDURE — 85025 COMPLETE CBC W/AUTO DIFF WBC: CPT | Performed by: EMERGENCY MEDICINE

## 2021-01-20 PROCEDURE — 81001 URINALYSIS AUTO W/SCOPE: CPT | Performed by: EMERGENCY MEDICINE

## 2021-01-20 PROCEDURE — 87088 URINE BACTERIA CULTURE: CPT | Performed by: EMERGENCY MEDICINE

## 2021-01-20 RX ORDER — ONDANSETRON 2 MG/ML
4 INJECTION INTRAMUSCULAR; INTRAVENOUS EVERY 30 MIN PRN
Status: DISCONTINUED | OUTPATIENT
Start: 2021-01-20 | End: 2021-01-21 | Stop reason: HOSPADM

## 2021-01-20 RX ORDER — SODIUM CHLORIDE 9 MG/ML
INJECTION, SOLUTION INTRAVENOUS CONTINUOUS
Status: DISCONTINUED | OUTPATIENT
Start: 2021-01-20 | End: 2021-01-21 | Stop reason: HOSPADM

## 2021-01-20 RX ADMIN — ONDANSETRON 4 MG: 2 INJECTION INTRAMUSCULAR; INTRAVENOUS at 19:11

## 2021-01-20 RX ADMIN — SODIUM CHLORIDE 1000 ML: 9 INJECTION, SOLUTION INTRAVENOUS at 19:08

## 2021-01-20 ASSESSMENT — ENCOUNTER SYMPTOMS
NAUSEA: 1
VOMITING: 1

## 2021-01-21 ENCOUNTER — TELEPHONE (OUTPATIENT)
Dept: EMERGENCY MEDICINE | Facility: CLINIC | Age: 81
End: 2021-01-21

## 2021-01-21 NOTE — ED NOTES
Bed: ED23  Expected date:   Expected time:   Means of arrival:   Comments:  [facility] 535 Vomiting aphasic dementia 80 f

## 2021-01-21 NOTE — ED NOTES
[facility] transportation arrives; verbal report and paperwork given to EMS crew.  Patient en route back to [facility].

## 2021-01-21 NOTE — ED NOTES
"Telephone call to [facility]; nurse made aware that patient would be returning to facility with no new orders.  Nurse verifies that patient appears to be at baseline and was sent to ER after 2 episodes of swallowing followed by eyes rolling back into head resulting in a < 30 second \"stare\".  Nurse request ED summary be sent to facility with patient upon returning.  Questions answered.  Plan to transport patient back via [facility].  "

## 2021-01-21 NOTE — ED PROVIDER NOTES
History   Chief Complaint:  Nausea & Vomiting       HPI   Adriane Flores is a 80 year old female with history of Alzheimer's who is currently on Xarelto who presents with 2 episodes of nausea and vomiting. EMS had no nausea and vomiting on route and administered Zofran. The patients  states that during one episode her eyes rolled back.    Review of Systems   Reason unable to perform ROS: ROS limited due to Memory disorder.   Gastrointestinal: Positive for nausea and vomiting.   All other systems reviewed and are negative.      Allergies:  Sulfa Drugs    Medications:  Tylenol  Aricept  Ativan  Namenda  Seroquel  Xarelto      Past Medical History:    Alzheimers  Memory disorder  ADD  Deconditioning  Closed fracture of neck  Hip hemiarthroplasty  Generalized osteoarthrosis  Disorder of bone  Flatulence     Past Surgical History:    Arthroscopy Knee  T and A  Tennis elbow surgery  Right photorefractive Keratectomy  Left leg surgery    Family History:    Alcohol/Drug abuse  Cerebrovascular disease  Recovering Alcohol/drug      Social History:  Arrives via EMS  In memory care    Physical Exam     Patient Vitals for the past 24 hrs:   BP Temp Temp src Pulse Resp SpO2   01/20/21 2200 (!) 144/76 -- -- 86 18 93 %   01/20/21 2100 107/65 -- -- 84 18 97 %   01/20/21 2000 (!) 153/91 -- -- 80 21 93 %   01/20/21 1945 -- -- -- 71 20 96 %   01/20/21 1930 120/63 -- -- 69 15 97 %   01/20/21 1915 -- -- -- 73 20 96 %   01/20/21 1900 107/61 -- -- 80 19 96 %   01/20/21 1857 -- 97.1  F (36.2  C) Temporal -- -- --       Physical Exam   General: Elderly woman, lying supine with eyes closed. Unable to participate in history or physical.    Eye:  Pupils are equal, round, and reactive.  Extraocular movements intact.    ENT:  No rhinorrhea.  Moist mucus membranes.  Normal tongue and tonsil.    Cardiac:  Regular rate and rhythm.  No murmurs, gallops, or rubs.    Pulmonary:  Clear to auscultation bilaterally.  No wheezes, rales, or  "rhonchi.    Abdomen:  Positive bowel sounds.  Abdomen is soft and non-distended, without focal tenderness.    Musculoskeletal:  Patient allows passive range of motion of all extremities without sign of injury.     Skin:  Warm and dry without rashes.    Neurologic:  Patient nonverbal and unable to follow any commands,though does withdrawal to pain.     Psychiatric:  Normal affect with appropriate interaction with examiner. Nonverbal at baseline, unchanged per nursing home staff.    Emergency Department Course   ECG (19:05:35):  Rate 83 bpm. MS interval 208. QRS duration 54. QT/QTc 374/439. P-R-T axes 44 11 19. Sinus rhythm with fusion complexes. Otherwise normal ECG Interpreted at 1908 by Trierweiler, Chad A, MD.    Laboratory:  CBC:  AWNL. (WBC 7.1, HGB 13.6, )     BMP: Glucose 100 (H) o/w WNL (Creatinine: 0.78)    Troponin (Collected 1859): <0.015    UA: Protein albumin 10, Nitrite Positive, Leukocyte Trace, Bacteria Many, Mucous Present, Hyaline casts 4 (H), o/w Negative    Urine Culture Aerobic Bacterial: Pending    Emergency Department Course:    Assessments:  1851 I completed initial assessment and exam  1953 I reassessed the patient and discussed results of the workup thus far  2056 I rechecked the patient prior to discharge.    1953 I contacted the patients  regarding patient history and his wishes for her care.    Interventions:  1908 NS 1L IV Bolus  1911 Zofran 4 mg IV    Disposition:  The patient was discharged to home.       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  The patient is unable to provide me with any history.  Therefore, we spoke with the facility.  They state that they were helping her eat.  After having some of her meal, she developed this \"blank stare\" and then had " an episode of vomiting.  This lasted only a few seconds and she seemed to be ready to eat again.  She took a few more bites of food and then had a second episode of vomiting.  This is unusual for her.  They denied that she had any coughing with this and they did not feel that it was likely that she aspirated.  However, with this change from her baseline, they sent her to us for evaluation.    On my exam, the patient is lying supine, preferring to keep her eyes closed but she will open them when stimulated.  Vital signs are reassuring.  Head to toe exam does not show any evidence of trauma.  She does move all extremities appropriately, and has appropriate response to noxious stimuli, pushing us away and fighting to some degree when her catheterized urine specimen was obtained.  Otherwise, laboratory investigation was pursued which is unremarkable.  Her EKG is normal and she has a negative troponin.  She is on blood thinners, but otherwise does not appear to be in any pain from a headache and shows no asymmetric neurologic deficits.  I feel this is unlikely to represent an acute stroke or bleed considering these 2 episodes of vomiting but no other ongoing symptoms.  We again spoke with her facility and they note that her baseline is 1 of lying fairly calm and not being terribly responsive, essentially how she is at this time.  I spoke with her  and went over her results and he feels comfortable having her return back to her facility.  At this point, we will hold off on any advanced imaging.  I did explain to the  that it is not exactly clear why she vomited twice today, but she has not had any ongoing vomiting here.  She has no abdominal pain.  However, I invited him to have her return to us if she develops ongoing symptoms or if there are signs of pain, fever, or other more serious pathologies, as we are very early on in her course of possible illness and this may evolve into something more  serious.      Diagnosis:    ICD-10-CM    1. Vomiting, intractability of vomiting not specified, presence of nausea not specified, unspecified vomiting type  R11.10 Urine Culture Aerobic Bacterial       Discharge Medications:  Discharge Medication List as of 1/20/2021  8:58 PM          Scribe Disclosure:  I, Jarod Beard, am serving as a scribe at 6:51 PM on 1/20/2021 to document services personally performed by Trierweiler, Chad A, MD based on my observations and the provider's statements to me.          Trierweiler, Chad A, MD  01/21/21 1115

## 2021-01-21 NOTE — TELEPHONE ENCOUNTER
[facility] Emergency Department Lab result notification [Adult-Female]    [facility] ED lab result protocol used  Urine culture    Reason for call  Notify of lab results, assess symptoms,  review ED providers recommendations/discharge instructions (if necessary) and advise per ED lab result f/u protocol    Lab Result (including Rx patient on, if applicable)  Preliminary urine culture report on 1/21/21 shows the presence of bacteria(s): >100,000 colonies/mL Escherichia coli  Emergency Dept/Urgent Care discharge antibiotic: None  Recommendations per [facility] ED Lab result protocol - Urine culture protocol.  Information table from ED Provider visit on 1/20/21  Symptoms reported at ED visit (Chief complaint, HPI) Nausea & Vomiting  HPI   Adriane Flores is a 80 year old female with history of Alzheimer's who is currently on Xarelto who presents with 2 episodes of nausea and vomiting. EMS had no nausea and vomiting on route and administered Zofran. The patients  states that during one episode her eyes rolled back.   Significant Medical hx, if applicable (i.e. CKD, diabetes) [facility] in [facility]   Allergies Allergies   Allergen Reactions     Sulfa Drugs      30 yrs ago didn't feel well      Weight, if applicable Wt Readings from Last 2 Encounters:   05/15/18 46.4 kg (102 lb 4.8 oz)   05/08/18 46.4 kg (102 lb 4.8 oz)      Coumadin/Warfarin [Yes /No]    Creatinine Level (mg/dl) Creatinine   Date Value Ref Range Status   01/20/2021 0.78 0.52 - 1.04 mg/dL Final      Creatinine clearance (ml/min), if applicable Creatinine clearance cannot be calculated (Unknown ideal weight.)   Pregnant (Yes/No/NA) No   Breastfeeding (Yes/No/NA) No   ED providers Impression and Plan (applicable information) This very unfortunate 80-year-old woman with a history of significant dementia and 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  The patient is unable to provide me with any history.  Therefore, we spoke with the facility.  They state that they were helping her eat.  After having some of her meal, she developed this \"blank stare\" and then had an episode of vomiting.  This lasted only a few seconds and she seemed to be ready to eat again.  She took a few more bites of food and then had a second episode of vomiting.  This is unusual for her.  They denied that she had any coughing with this and they did not feel that it was likely that she aspirated.  However, with this change from her baseline, they sent her to us for evaluation.     On my exam, the patient is lying supine, preferring to keep her eyes closed but she will open them when stimulated.  Vital signs are reassuring.  Head to toe exam does not show any evidence of trauma.  She does move all extremities appropriately, and has appropriate response to noxious stimuli, pushing us away and fighting to some degree when her catheterized urine specimen was obtained.  Otherwise, laboratory investigation was pursued which is unremarkable.  Her EKG is normal and she has a negative troponin.  She is on blood thinners, but otherwise does not appear to be in any pain from a headache and shows no asymmetric neurologic deficits.  I feel this is unlikely to represent an acute stroke or bleed considering these 2 episodes of vomiting but no other ongoing symptoms.  We again spoke with her facility and they note that her baseline is 1 of lying fairly calm and not being terribly responsive, essentially how she is at this time.  I spoke with her  and went over her results and he feels comfortable having her return back to her facility.  At this point, we will hold off on any advanced imaging.  I did explain to the  that it is not exactly clear why she vomited twice today, but she has not had any ongoing vomiting here.  She has no abdominal pain.  However, I invited him " to have her return to us if she develops ongoing symptoms or if there are signs of pain, fever, or other more serious pathologies, as we are very early on in her course of possible illness and this may evolve into something more serious.        Diagnosis:      ICD-10-CM     1. Vomiting, intractability of vomiting not specified, presence of nausea not specified, unspecified vomiting type         ED diagnosis Above   ED provider    Trierweiler, Chad A, MD  01/21/21 1115      RN Assessment (Patient s current Symptoms), include time called.  [Insert Left message here if message left]  12:23 pm Result faxed to [facility] in [place], attention Nurse Myles at [phone].  I also spoke with Myles after their lunch hour and informed her of this result. Please fax final when available.     PCP follow-up Questions asked: YES       Pepper Wilburn RN  Study[employer] [place]   Lung Nodule and ED Lab Result F/U RN  Epic pool (ED late result f/u RN): P [id number]  # [phone]

## 2021-01-21 NOTE — ED TRIAGE NOTES
[facility]  ED Arrival Note      Means of Arrival: EMS  Comes from: Nursing Home    Story: Pt has a bradley of dementia. She has aphasia and is not verbal per baseline. Today at around dinner time, she vomited twice. 911 was called for evaluation.        EMS/PD Interventions: N/A  EMS Medications: Zofran      Directed to: Main ED  Belongings: In room locker

## 2021-01-22 LAB
BACTERIA SPEC CULT: ABNORMAL
Lab: ABNORMAL
SPECIMEN SOURCE: ABNORMAL

## 2021-09-06 NOTE — DISCHARGE SUMMARY
[facility]    Discharge Summary  Hospitalist    Date of Admission:  3/19/2018  Date of Discharge:  3/24/2018  Discharging Provider: Miguelina Helton    Discharge Diagnoses   R Femoral Neck Fracture with impaction s/p R hip bipolar hemiarthroplasty on 3/20/18  Advanced Alzheimer's Dementia    History of Present Illness   Adriane Flores is a 77 year old female with PMHx of advanced dementia and osteoporosis with chronic right hip pain who was admitted on 3/19/2018 after a fall with inability to ambulate. She was found to have a right femoral neck fracture with impaction.     Hospital Course   Adriane Flores was admitted on 3/19/2018.  The following problems were addressed during her hospitalization:     R Femoral Neck Fracture with impaction s/p R hip hemiarthroplasty on 3/20/18:   Likely occurred after a fall sustained 3d prior to presentation. No LOC with fall. Ortho consulted and she underwent a R bipolar hip hemiarthroplasty per Dr. Ellington on 3/20/18. Postop course was uneventful. Avoided narcotics given predisposition to delirium with her dementia. Her pain was managed with sched Tylenol (325mg TID), though she would intermittently refuse doses. Seen by therapy services postop, difficult time with participation given her advanced dementia. Recommended discharge to TCU. Discharge on Xarelto for DVT ppx. Will need to be monitored closely at TCU while on anticoagulation given her fall risk.     Advanced Alzheimer's Dementia  Prior to admission she had been residing with her  at home. Has a caregiver 6d/wk (exept for Sundays from 8-5).   Kept on PTA meds this stay, including Namenda, Aricept, Wellbutrin and prn Ativan, though some formulations were changed from long acting to short acting as she was requiring her meds to be crushed and given with applesauce.   PRN Seroquel ordered to help manage agitation/insomnia -- had minimal need during postop period  Discharged to memory  Received notification from on-call service  Returned call to patient [[id number]]  Patient reports use of oxy-IR 5 mg at night on Friday [three nights ago] prior to bed with acute crescendo of abdominal pain localized to known cancer but with further diffuse expansion of pain  At that point, patient took a second dose of medication with continued worsening of pain  Unsure what to do, patient took a dose of  's hydromorphone 2 mg tab with resolution of pain within 20-30 minutes  Overall pain exacerbation lasted 6-8 hours, constant over exacerbation time course  "Pain was worse after the medication " Denied difficulty breathing or other symptoms of concern  No further use of either oxy-IR or PO hydromorphone over the past 2 days  Denies nausea, vomiting  Appetite intact  Tolerating PO intake without difficulty  BM daily, last BM this AM  Sleep adequate after use of PO hydromorphone on initial night of increased pain, otherwise sleeping ok without use of meds  Patient will be out-of-town for the upcoming week but will have access via cell phone  Controlled Substance Review    [place] PDMP or [place]  reviewed: No red flags were identified; safe to proceed with prescription  Suzette Ernst     PDMP Review       Value Time User    PDMP Reviewed  Yes 2021  9:34 AM Herber Multani MD        Prescriptions  Total Prescriptions: 1    Total Private Pay: 0    Fill Date ID   Written Drug Qty Days Prescriber Rx # Pharmacy Refill   Daily Dose* Pymt Type      2021  1   2021  Oxycodone Hcl 5 MG Tablet  20 00  3 [facility]   [id number]   Rit (5801)   0  50 00 MME  Comm Ins   Commercial Insurance coverage [place]       Plan:   - discontinue oxy-IR   - trial hydromorphone 2 mg PO Q4H PRN   - will supply enough for one week   - if any further adverse reactions, contact [facility] team   - if any difficulty breathing, tongue/facial swelling please go directly to the ED; low likelihood given no similar reaction reported with [facility]-   - [facility] team to follow up with patient tomorrow    Gucci Diallo MD  Palliative and Supportive Care  Ph: [phone] care TCU for ongoing care.       Discharge Pain Plan:  - During her hospitalization, Adriane experienced pain due to dementia.  She is unable to participate in a plan for discharge pain management.   - See above -- she will discharge with Tylenol for pain, has not needed Tramadol or narcotics this stay so they will not prescribed at discharge    Miguelina Helton    Significant Results and Procedures   Procedure Date: 03/20/2018     SURGEON:  Dr. Cayetano Ellington MD      PREOPERATIVE DIAGNOSIS:  Displaced right femoral neck fracture.   POSTOPERATIVE DIAGNOSIS:  Displaced right femoral neck fracture.       PROCEDURE:  Open reduction and internal fixation of displaced right femoral neck fracture using bipolar hemiarthroplasty.     Code Status   Full Code       Primary Care Physician   Saul Wheeler    Physical Exam   Temp: 98.3  F (36.8  C) Temp src: Oral BP: 131/73 Pulse: 67 Heart Rate: 63 Resp: 16 SpO2: 96 % O2 Device: None (Room air)    Vitals:    03/19/18 1933 03/22/18 0702   Weight: 49.9 kg (110 lb) 50.3 kg (111 lb)     Vital Signs with Ranges  Temp:  [97.4  F (36.3  C)-98.8  F (37.1  C)] 98.3  F (36.8  C)  Pulse:  [67-69] 67  Heart Rate:  [63-93] 63  Resp:  [16-18] 16  BP: (131-171)/(73-88) 131/73  SpO2:  [94 %-97 %] 96 %  I/O last 3 completed shifts:  In: 25 [P.O.:25]  Out: -     General: Resting comfortably, alert, pleasantly confused and unable to state name/location/year or follow commands, NAD  CVS: HRRR, no MGR, no LE edema  Respiratory: CTAB, no wheeze/rales/rhonchi, no increased work of breathing  GI: S, NT, ND, +BS  Skin: Warm/dry    Discharge Disposition   Discharged to memory care TCU  Condition at discharge: Stable    Consultations This Hospital Stay   ORTHOPEDIC SURGERY IP CONSULT  PHYSICAL THERAPY ADULT IP CONSULT  OCCUPATIONAL THERAPY ADULT IP CONSULT    Time Spent on this Encounter   IMiguelina, personally saw the patient today and spent greater than 30 minutes  discharging this patient.    Discharge Orders     General info for SNF   Length of Stay Estimate: Short Term Care: Estimated # of Days <30  Condition at Discharge: Improving  Level of care:skilled   Rehabilitation Potential: Good  Admission H&P remains valid and up-to-date: Yes  Recent Chemotherapy: N/A  Use Nursing Home Standing Orders: Yes     Mantoux instructions   Give two-step Mantoux (PPD) Per Facility Policy Yes     Reason for your hospital stay   Right hip hemiarthroplasty secondary to femoral neck fracture     Wound care (specify)   Site:   Right hip  Instructions:  Change daily and as needed     Follow Up and recommended labs and tests   Follow up with Dr. Ellington in 2 weeks.  No follow up labs or test are needed.  Call [phone]     Activity - Up with assistive device     Weight bearing status   WBAT     Encourage PO fluids     Additional Discharge Instructions   Taking meds crushed in applesauce or pudding.     Full Code     Physical Therapy Adult Consult   Evaluate and treat as clinically indicated.    Reason:  Right hip hemiarthroplasty, no hip precautions     Occupational Therapy Adult Consult   Evaluate and treat as clinically indicated.    Reason:  Right hip hemiarthroplasty, no hip precautions     Fall precautions     Advance Diet as Tolerated   Follow this diet upon discharge: Orders Placed This Encounter     Advance Diet as Tolerated: Regular Diet Adult       Discharge Medications   Current Discharge Medication List      START taking these medications    Details   buPROPion (WELLBUTRIN) 75 MG tablet Take 1 tablet (75 mg) by mouth 2 times daily  Qty: 90 tablet    Associated Diagnoses: Alzheimer's dementia without behavioral disturbance, unspecified timing of dementia onset      QUEtiapine (SEROQUEL) 25 MG tablet Take 0.5 tablets (12.5 mg) by mouth 2 times daily as needed (give for severe agitation/at danger to self/others during the day, or for persistent insomina, mild agitation at  night.)  Qty: 60 tablet    Associated Diagnoses: Alzheimer's dementia without behavioral disturbance, unspecified timing of dementia onset      acetaminophen (TYLENOL) 325 MG tablet Take 2 tablets (650 mg) by mouth every 8 hours  Qty: 60 tablet, Refills: 0    Associated Diagnoses: Closed fracture of neck of right femur, initial encounter (H)      rivaroxaban ANTICOAGULANT (XARELTO) 10 MG TABS tablet Take 1 tablet (10 mg) by mouth daily (with dinner)  Qty: 21 tablet, Refills: 0    Associated Diagnoses: Closed fracture of neck of right femur, initial encounter (H)         CONTINUE these medications which have CHANGED    Details   memantine XR (NAMENDA XR) 28 MG 24 hr capsule Take 1 capsule (28 mg) by mouth daily    Comments: May open the capsule and sprinkle on applesauce  Associated Diagnoses: Alzheimer's dementia without behavioral disturbance, unspecified timing of dementia onset      LORazepam (ATIVAN) 0.5 MG tablet Take 1 tablet (0.5 mg) by mouth every 8 hours as needed for anxiety  Qty: 30 tablet, Refills: 0    Associated Diagnoses: Closed fracture of neck of right femur, initial encounter (H)         CONTINUE these medications which have NOT CHANGED    Details   donepezil (ARICEPT) 5 MG tablet Take 1 tablet (5 mg) by mouth At Bedtime  Qty: 90 tablet, Refills: 3    Associated Diagnoses: Need for prophylactic vaccination and inoculation against influenza         STOP taking these medications       buPROPion (WELLBUTRIN XL) 150 MG 24 hr tablet Comments:   Reason for Stopping:             Allergies   Allergies   Allergen Reactions     Sulfa Drugs      30 yrs ago didn't feel well     Data   Most Recent 3 CBC's:  Recent Labs   Lab Test  03/23/18   0554  03/22/18   0630  03/21/18   0701  03/20/18   2110  03/19/18   2156  07/05/17   1145  03/10/17   1033   WBC   --    --    --    --   9.8  6.0  6.0   HGB   --   10.9*  11.6*   --   12.5  14.5  14.9   MCV   --    --    --    --   92  94  95   PLT  264   --    --   237   250  242  231      Most Recent 3 BMP's:  Recent Labs   Lab Test  03/24/18   0659  03/22/18   0630  03/21/18   0701   03/19/18   2156   NA   --   141  138   --   135   POTASSIUM   --   3.8  4.2   --   4.4   CHLORIDE   --   109  106   --   100   CO2   --   25  26   --   26   BUN   --   12  16   --   31*   CR  0.57  0.72  0.74   < >  0.73   ANIONGAP   --   7  6   --   9   AWILDA   --   8.0*  7.9*   --   8.1*   GLC   --   99  92   --   102*    < > = values in this interval not displayed.     Results for orders placed or performed during the hospital encounter of 03/19/18   XR Pelvis w Hip Right 1 View    Narrative    PELVIS AND HIP RIGHT ONE VIEW   3/19/2018 9:22 PM     HISTORY: Pain.    COMPARISON: 2/23/2016.      Impression    IMPRESSION: Acute impacted fracture through the right femoral neck.  Right femoral head remains located at the hip joint. Left hip shows no  acute abnormality.        JAMES BENTON MD   Knee XR, 3 views, left    Narrative    LEFT KNEE THREE VIEWS     3/19/2018 9:23 PM     HISTORY: Fall with left knee pain and bruising.    COMPARISON: None.      Impression    IMPRESSION: No acute fracture. Anatomic alignment. Small osteophytes  noted.       JAMES BENTON MD   XR Chest 1 View    Narrative    CHEST ONE VIEW   3/19/2018  10:14 PM     HISTORY: Pain.    COMPARISON: Chest CT 12/17/2007.      Impression    IMPRESSION: Pulmonary vasculature is indistinct suggestive of vascular  congestion. Cardiac silhouette within normal limits. No definite focal  airspace opacity. No pleural effusion or pneumothorax identified on  supine film. The bones are unremarkable.    ULICES BRISCOE MD   XR Pelvis w Hip Port Right 1 View    Narrative    PORTABLE PELVIS AND RIGHT HIP ONE VIEW   3/20/2018 6:19 PM     HISTORY: Postoperative hip arthroplasty.    COMPARISON: 3/19/2018      Impression    IMPRESSION: Interval right hip arthroplasty. The femoral prosthesis  appears located on the AP projection. The lateral projection  is  suboptimal as the acetabulum was not definitely visualized.     JEFFERY MORILLO MD

## (undated) DEVICE — MANIFOLD NEPTUNE 4 PORT 700-20

## (undated) DEVICE — GLOVE PROTEXIS POWDER FREE 7.5 ORTHOPEDIC 2D73ET75

## (undated) DEVICE — PAD FOAM MCGUIRE KIT 0814-0150

## (undated) DEVICE — IMM PILLOW ABDUCT HIP MED 31143061

## (undated) DEVICE — SU VICRYL 1 CP-1 27" J468H

## (undated) DEVICE — DRSG AQUACEL AG 3.5X6.0" HYDROFIBER 412010

## (undated) DEVICE — ESU GROUND PAD UNIVERSAL W/O CORD

## (undated) DEVICE — DRAPE IOBAN INCISE 23X17" 6650EZ

## (undated) DEVICE — GLOVE PROTEXIS BLUE W/NEU-THERA 7.5  2D73EB75

## (undated) DEVICE — BLADE KNIFE SURG 10 371110

## (undated) DEVICE — DRSG AQUACEL AG 3.5X9.75" HYDROFIBER 412011

## (undated) DEVICE — DRSG ADAPTIC 3X8" 6113

## (undated) DEVICE — DRSG STERI STRIP 1/2X4" R1547

## (undated) DEVICE — ESU ELEC BLADE 6" COATED E1450-6

## (undated) DEVICE — GLOVE PROTEXIS POWDER FREE 7.0 ORTHOPEDIC 2D73ET70

## (undated) DEVICE — PREP CHLORAPREP 26ML TINTED ORANGE  260815

## (undated) DEVICE — SOL BENZOIN 0.5OZ

## (undated) DEVICE — GLOVE PROTEXIS W/NEU-THERA 7.5  2D73TE75

## (undated) DEVICE — STPL SKIN SUBCUTICULAR INSORB 1025

## (undated) DEVICE — PACK TOTAL HIP W/POUCH SOP15HPFSM

## (undated) DEVICE — SPONGE LAP 18X18" X8435

## (undated) DEVICE — DRSG KERLIX FLUFFS X5

## (undated) DEVICE — SOL NACL 0.9% IRRIG 1000ML BOTTLE 07138-09

## (undated) DEVICE — LINEN TOWEL PACK X5 5464

## (undated) DEVICE — BLADE SAW SAGITTAL STRK 25X90X1.27MM HD SYS 6 6125-127-090

## (undated) RX ORDER — PROPOFOL 10 MG/ML
INJECTION, EMULSION INTRAVENOUS
Status: DISPENSED
Start: 2018-03-20

## (undated) RX ORDER — ACETAMINOPHEN 10 MG/ML
INJECTION, SOLUTION INTRAVENOUS
Status: DISPENSED
Start: 2018-03-20

## (undated) RX ORDER — FENTANYL CITRATE 50 UG/ML
INJECTION, SOLUTION INTRAMUSCULAR; INTRAVENOUS
Status: DISPENSED
Start: 2018-03-20